# Patient Record
Sex: FEMALE | Race: OTHER | HISPANIC OR LATINO | URBAN - METROPOLITAN AREA
[De-identification: names, ages, dates, MRNs, and addresses within clinical notes are randomized per-mention and may not be internally consistent; named-entity substitution may affect disease eponyms.]

---

## 2019-09-20 ENCOUNTER — OUTPATIENT (OUTPATIENT)
Dept: OUTPATIENT SERVICES | Facility: HOSPITAL | Age: 79
LOS: 1 days | Discharge: HOME | End: 2019-09-20

## 2019-09-20 ENCOUNTER — APPOINTMENT (OUTPATIENT)
Dept: ULTRASOUND IMAGING | Facility: HOSPITAL | Age: 79
End: 2019-09-20
Payer: MEDICARE

## 2019-09-20 DIAGNOSIS — N20.0 CALCULUS OF KIDNEY: ICD-10-CM

## 2019-09-20 PROCEDURE — 76775 US EXAM ABDO BACK WALL LIM: CPT | Mod: 26

## 2019-09-30 ENCOUNTER — EMERGENCY (EMERGENCY)
Facility: HOSPITAL | Age: 79
LOS: 0 days | Discharge: HOME | End: 2019-09-30
Attending: EMERGENCY MEDICINE
Payer: MEDICARE

## 2019-09-30 VITALS
SYSTOLIC BLOOD PRESSURE: 146 MMHG | HEART RATE: 74 BPM | RESPIRATION RATE: 18 BRPM | TEMPERATURE: 98 F | DIASTOLIC BLOOD PRESSURE: 57 MMHG | OXYGEN SATURATION: 95 %

## 2019-09-30 VITALS
RESPIRATION RATE: 18 BRPM | HEART RATE: 68 BPM | SYSTOLIC BLOOD PRESSURE: 145 MMHG | TEMPERATURE: 99 F | WEIGHT: 169.98 LBS | OXYGEN SATURATION: 95 % | DIASTOLIC BLOOD PRESSURE: 57 MMHG

## 2019-09-30 DIAGNOSIS — Z87.891 PERSONAL HISTORY OF NICOTINE DEPENDENCE: ICD-10-CM

## 2019-09-30 DIAGNOSIS — R60.0 LOCALIZED EDEMA: ICD-10-CM

## 2019-09-30 DIAGNOSIS — Z87.442 PERSONAL HISTORY OF URINARY CALCULI: ICD-10-CM

## 2019-09-30 DIAGNOSIS — R06.00 DYSPNEA, UNSPECIFIED: ICD-10-CM

## 2019-09-30 DIAGNOSIS — R06.02 SHORTNESS OF BREATH: ICD-10-CM

## 2019-09-30 DIAGNOSIS — Z87.440 PERSONAL HISTORY OF URINARY (TRACT) INFECTIONS: ICD-10-CM

## 2019-09-30 DIAGNOSIS — I10 ESSENTIAL (PRIMARY) HYPERTENSION: ICD-10-CM

## 2019-09-30 LAB
ALBUMIN SERPL ELPH-MCNC: 3.1 G/DL — LOW (ref 3.3–5)
ALP SERPL-CCNC: 122 U/L — HIGH (ref 40–120)
ALT FLD-CCNC: 19 U/L — SIGNIFICANT CHANGE UP (ref 12–78)
ANION GAP SERPL CALC-SCNC: 6 MMOL/L — SIGNIFICANT CHANGE UP (ref 5–17)
APPEARANCE UR: CLEAR — SIGNIFICANT CHANGE UP
AST SERPL-CCNC: 26 U/L — SIGNIFICANT CHANGE UP (ref 15–37)
BACTERIA # UR AUTO: ABNORMAL
BASOPHILS # BLD AUTO: 0.02 K/UL — SIGNIFICANT CHANGE UP (ref 0–0.2)
BASOPHILS NFR BLD AUTO: 0.2 % — SIGNIFICANT CHANGE UP (ref 0–2)
BILIRUB SERPL-MCNC: 0.3 MG/DL — SIGNIFICANT CHANGE UP (ref 0.2–1.2)
BILIRUB UR-MCNC: NEGATIVE — SIGNIFICANT CHANGE UP
BUN SERPL-MCNC: 21 MG/DL — SIGNIFICANT CHANGE UP (ref 7–23)
CALCIUM SERPL-MCNC: 10.2 MG/DL — HIGH (ref 8.5–10.1)
CHLORIDE SERPL-SCNC: 104 MMOL/L — SIGNIFICANT CHANGE UP (ref 96–108)
CO2 SERPL-SCNC: 26 MMOL/L — SIGNIFICANT CHANGE UP (ref 22–31)
COLOR SPEC: YELLOW — SIGNIFICANT CHANGE UP
CREAT SERPL-MCNC: 1.02 MG/DL — SIGNIFICANT CHANGE UP (ref 0.5–1.3)
DIFF PNL FLD: NEGATIVE — SIGNIFICANT CHANGE UP
EOSINOPHIL # BLD AUTO: 0.18 K/UL — SIGNIFICANT CHANGE UP (ref 0–0.5)
EOSINOPHIL NFR BLD AUTO: 2.1 % — SIGNIFICANT CHANGE UP (ref 0–6)
EPI CELLS # UR: SIGNIFICANT CHANGE UP
GLUCOSE SERPL-MCNC: 97 MG/DL — SIGNIFICANT CHANGE UP (ref 70–99)
GLUCOSE UR QL: NEGATIVE MG/DL — SIGNIFICANT CHANGE UP
HCT VFR BLD CALC: 38 % — SIGNIFICANT CHANGE UP (ref 34.5–45)
HGB BLD-MCNC: 11.9 G/DL — SIGNIFICANT CHANGE UP (ref 11.5–15.5)
IMM GRANULOCYTES NFR BLD AUTO: 0.3 % — SIGNIFICANT CHANGE UP (ref 0–1.5)
KETONES UR-MCNC: NEGATIVE — SIGNIFICANT CHANGE UP
LEUKOCYTE ESTERASE UR-ACNC: ABNORMAL
LYMPHOCYTES # BLD AUTO: 2.12 K/UL — SIGNIFICANT CHANGE UP (ref 1–3.3)
LYMPHOCYTES # BLD AUTO: 24.7 % — SIGNIFICANT CHANGE UP (ref 13–44)
MCHC RBC-ENTMCNC: 26.7 PG — LOW (ref 27–34)
MCHC RBC-ENTMCNC: 31.3 GM/DL — LOW (ref 32–36)
MCV RBC AUTO: 85.2 FL — SIGNIFICANT CHANGE UP (ref 80–100)
MONOCYTES # BLD AUTO: 0.73 K/UL — SIGNIFICANT CHANGE UP (ref 0–0.9)
MONOCYTES NFR BLD AUTO: 8.5 % — SIGNIFICANT CHANGE UP (ref 2–14)
NEUTROPHILS # BLD AUTO: 5.5 K/UL — SIGNIFICANT CHANGE UP (ref 1.8–7.4)
NEUTROPHILS NFR BLD AUTO: 64.2 % — SIGNIFICANT CHANGE UP (ref 43–77)
NITRITE UR-MCNC: NEGATIVE — SIGNIFICANT CHANGE UP
NRBC # BLD: 0 /100 WBCS — SIGNIFICANT CHANGE UP (ref 0–0)
PH UR: 6 — SIGNIFICANT CHANGE UP (ref 5–8)
PLATELET # BLD AUTO: 288 K/UL — SIGNIFICANT CHANGE UP (ref 150–400)
POTASSIUM SERPL-MCNC: 4.9 MMOL/L — SIGNIFICANT CHANGE UP (ref 3.5–5.3)
POTASSIUM SERPL-SCNC: 4.9 MMOL/L — SIGNIFICANT CHANGE UP (ref 3.5–5.3)
PROT SERPL-MCNC: 7.7 GM/DL — SIGNIFICANT CHANGE UP (ref 6–8.3)
PROT UR-MCNC: NEGATIVE MG/DL — SIGNIFICANT CHANGE UP
RBC # BLD: 4.46 M/UL — SIGNIFICANT CHANGE UP (ref 3.8–5.2)
RBC # FLD: 14.1 % — SIGNIFICANT CHANGE UP (ref 10.3–14.5)
SODIUM SERPL-SCNC: 136 MMOL/L — SIGNIFICANT CHANGE UP (ref 135–145)
SP GR SPEC: 1.01 — SIGNIFICANT CHANGE UP (ref 1.01–1.02)
TROPONIN I SERPL-MCNC: <.015 NG/ML — SIGNIFICANT CHANGE UP (ref 0.01–0.04)
UROBILINOGEN FLD QL: NEGATIVE MG/DL — SIGNIFICANT CHANGE UP
WBC # BLD: 8.58 K/UL — SIGNIFICANT CHANGE UP (ref 3.8–10.5)
WBC # FLD AUTO: 8.58 K/UL — SIGNIFICANT CHANGE UP (ref 3.8–10.5)
WBC UR QL: SIGNIFICANT CHANGE UP

## 2019-09-30 PROCEDURE — 71275 CT ANGIOGRAPHY CHEST: CPT | Mod: 26

## 2019-09-30 PROCEDURE — 71045 X-RAY EXAM CHEST 1 VIEW: CPT | Mod: 26

## 2019-09-30 PROCEDURE — 99284 EMERGENCY DEPT VISIT MOD MDM: CPT

## 2019-09-30 PROCEDURE — 93970 EXTREMITY STUDY: CPT | Mod: 26

## 2019-09-30 NOTE — ED ADULT NURSE NOTE - OBJECTIVE STATEMENT
79 female with a hx of htn complains of shortness of breath and bilateral 79 female with a hx of htn complains of shortness of breath and bilateral leg swelling from friday.

## 2019-09-30 NOTE — ED PROVIDER NOTE - PATIENT PORTAL LINK FT
You can access the FollowMyHealth Patient Portal offered by [facility] by registering at the following website: [email or web address]. By joining [employer]’s FollowMyHealth portal, you will also be able to view your health information using other applications (apps) compatible with our system.

## 2019-09-30 NOTE — ED PROVIDER NOTE - CLINICAL SUMMARY MEDICAL DECISION MAKING FREE TEXT BOX
labs, CT and US WNL; patient declined additional work up or cardiac obs and preferred to follow up with PMD; patient and family aware of signs/symptoms to return to ED

## 2019-09-30 NOTE — ED ADULT NURSE NOTE - NSIMPLEMENTINTERV_GEN_ALL_ED
Implemented All Universal Safety Interventions:  [place] to call system. Call bell, personal items and telephone within reach. Instruct patient to call for assistance. Room bathroom lighting operational. Non-slip footwear when patient is off stretcher. Physically safe environment: no spills, clutter or unnecessary equipment. Stretcher in lowest position, wheels locked, appropriate side rails in place.

## 2019-10-01 ENCOUNTER — OUTPATIENT (OUTPATIENT)
Dept: OUTPATIENT SERVICES | Facility: HOSPITAL | Age: 79
LOS: 1 days | End: 2019-10-01
Payer: MEDICARE

## 2019-10-01 LAB
CULTURE RESULTS: SIGNIFICANT CHANGE UP
SPECIMEN SOURCE: SIGNIFICANT CHANGE UP

## 2019-10-01 PROCEDURE — G9001: CPT

## 2019-10-10 DIAGNOSIS — Z71.89 OTHER SPECIFIED COUNSELING: ICD-10-CM

## 2019-10-10 PROBLEM — R73.03 PREDIABETES: Chronic | Status: ACTIVE | Noted: 2019-09-30

## 2021-07-17 ENCOUNTER — TRANSCRIPTION ENCOUNTER (OUTPATIENT)
Age: 81
End: 2021-07-17

## 2022-07-06 ENCOUNTER — APPOINTMENT (OUTPATIENT)
Dept: ORTHOPEDIC SURGERY | Facility: CLINIC | Age: 82
End: 2022-07-06

## 2022-07-06 PROBLEM — Z00.00 ENCOUNTER FOR PREVENTIVE HEALTH EXAMINATION: Status: ACTIVE | Noted: 2022-07-06

## 2022-07-14 ENCOUNTER — APPOINTMENT (OUTPATIENT)
Dept: ORTHOPEDIC SURGERY | Facility: CLINIC | Age: 82
End: 2022-07-14

## 2022-07-14 VITALS — BODY MASS INDEX: 34.55 KG/M2 | WEIGHT: 176 LBS | HEIGHT: 60 IN

## 2022-07-14 DIAGNOSIS — I10 ESSENTIAL (PRIMARY) HYPERTENSION: ICD-10-CM

## 2022-07-14 DIAGNOSIS — E78.00 PURE HYPERCHOLESTEROLEMIA, UNSPECIFIED: ICD-10-CM

## 2022-07-14 DIAGNOSIS — M17.0 BILATERAL PRIMARY OSTEOARTHRITIS OF KNEE: ICD-10-CM

## 2022-07-14 PROCEDURE — 99214 OFFICE O/P EST MOD 30 MIN: CPT | Mod: 25

## 2022-07-14 PROCEDURE — 73562 X-RAY EXAM OF KNEE 3: CPT | Mod: 50

## 2022-07-14 PROCEDURE — 20610 DRAIN/INJ JOINT/BURSA W/O US: CPT | Mod: 50

## 2022-09-08 ENCOUNTER — APPOINTMENT (OUTPATIENT)
Dept: MRI IMAGING | Facility: IMAGING CENTER | Age: 82
End: 2022-09-08

## 2023-06-04 ENCOUNTER — INPATIENT (INPATIENT)
Facility: HOSPITAL | Age: 83
LOS: 4 days | Discharge: HOME HEALTH | End: 2023-06-09
Attending: INTERNAL MEDICINE | Admitting: INTERNAL MEDICINE
Payer: MEDICARE

## 2023-06-04 VITALS
OXYGEN SATURATION: 96 % | HEART RATE: 129 BPM | WEIGHT: 179.9 LBS | DIASTOLIC BLOOD PRESSURE: 54 MMHG | RESPIRATION RATE: 18 BRPM | SYSTOLIC BLOOD PRESSURE: 140 MMHG | TEMPERATURE: 102 F

## 2023-06-04 DIAGNOSIS — I10 ESSENTIAL (PRIMARY) HYPERTENSION: ICD-10-CM

## 2023-06-04 DIAGNOSIS — Z95.5 PRESENCE OF CORONARY ANGIOPLASTY IMPLANT AND GRAFT: ICD-10-CM

## 2023-06-04 DIAGNOSIS — A41.9 SEPSIS, UNSPECIFIED ORGANISM: ICD-10-CM

## 2023-06-04 DIAGNOSIS — R73.03 PREDIABETES: ICD-10-CM

## 2023-06-04 DIAGNOSIS — N20.0 CALCULUS OF KIDNEY: ICD-10-CM

## 2023-06-04 DIAGNOSIS — J84.10 PULMONARY FIBROSIS, UNSPECIFIED: ICD-10-CM

## 2023-06-04 LAB
ALBUMIN SERPL ELPH-MCNC: 3 G/DL — LOW (ref 3.3–5)
ALP SERPL-CCNC: 123 U/L — HIGH (ref 40–120)
ALT FLD-CCNC: 27 U/L — SIGNIFICANT CHANGE UP (ref 12–78)
ANION GAP SERPL CALC-SCNC: 5 MMOL/L — SIGNIFICANT CHANGE UP (ref 5–17)
APPEARANCE UR: ABNORMAL
APTT BLD: 27.3 SEC — LOW (ref 27.5–35.5)
AST SERPL-CCNC: 19 U/L — SIGNIFICANT CHANGE UP (ref 15–37)
BACTERIA # UR AUTO: ABNORMAL
BASOPHILS # BLD AUTO: 0.02 K/UL — SIGNIFICANT CHANGE UP (ref 0–0.2)
BASOPHILS NFR BLD AUTO: 0.2 % — SIGNIFICANT CHANGE UP (ref 0–2)
BILIRUB SERPL-MCNC: 1 MG/DL — SIGNIFICANT CHANGE UP (ref 0.2–1.2)
BILIRUB UR-MCNC: NEGATIVE — SIGNIFICANT CHANGE UP
BUN SERPL-MCNC: 19 MG/DL — SIGNIFICANT CHANGE UP (ref 7–23)
CALCIUM SERPL-MCNC: 11.1 MG/DL — HIGH (ref 8.5–10.1)
CHLORIDE SERPL-SCNC: 102 MMOL/L — SIGNIFICANT CHANGE UP (ref 96–108)
CO2 SERPL-SCNC: 28 MMOL/L — SIGNIFICANT CHANGE UP (ref 22–31)
COLOR SPEC: YELLOW — SIGNIFICANT CHANGE UP
CREAT SERPL-MCNC: 1.13 MG/DL — SIGNIFICANT CHANGE UP (ref 0.5–1.3)
DIFF PNL FLD: ABNORMAL
EGFR: 48 ML/MIN/1.73M2 — LOW
EOSINOPHIL # BLD AUTO: 0.04 K/UL — SIGNIFICANT CHANGE UP (ref 0–0.5)
EOSINOPHIL NFR BLD AUTO: 0.3 % — SIGNIFICANT CHANGE UP (ref 0–6)
EPI CELLS # UR: SIGNIFICANT CHANGE UP
GLUCOSE SERPL-MCNC: 151 MG/DL — HIGH (ref 70–99)
GLUCOSE UR QL: NEGATIVE MG/DL — SIGNIFICANT CHANGE UP
HCT VFR BLD CALC: 42.8 % — SIGNIFICANT CHANGE UP (ref 34.5–45)
HGB BLD-MCNC: 13.2 G/DL — SIGNIFICANT CHANGE UP (ref 11.5–15.5)
IMM GRANULOCYTES NFR BLD AUTO: 0.4 % — SIGNIFICANT CHANGE UP (ref 0–0.9)
INR BLD: 1.11 RATIO — SIGNIFICANT CHANGE UP (ref 0.88–1.16)
KETONES UR-MCNC: NEGATIVE — SIGNIFICANT CHANGE UP
LACTATE SERPL-SCNC: 1.4 MMOL/L — SIGNIFICANT CHANGE UP (ref 0.7–2)
LEUKOCYTE ESTERASE UR-ACNC: ABNORMAL
LYMPHOCYTES # BLD AUTO: 0.72 K/UL — LOW (ref 1–3.3)
LYMPHOCYTES # BLD AUTO: 5.7 % — LOW (ref 13–44)
MCHC RBC-ENTMCNC: 27.3 PG — SIGNIFICANT CHANGE UP (ref 27–34)
MCHC RBC-ENTMCNC: 30.8 G/DL — LOW (ref 32–36)
MCV RBC AUTO: 88.4 FL — SIGNIFICANT CHANGE UP (ref 80–100)
MONOCYTES # BLD AUTO: 0.98 K/UL — HIGH (ref 0–0.9)
MONOCYTES NFR BLD AUTO: 7.8 % — SIGNIFICANT CHANGE UP (ref 2–14)
NEUTROPHILS # BLD AUTO: 10.81 K/UL — HIGH (ref 1.8–7.4)
NEUTROPHILS NFR BLD AUTO: 85.6 % — HIGH (ref 43–77)
NITRITE UR-MCNC: POSITIVE
NRBC # BLD: 0 /100 WBCS — SIGNIFICANT CHANGE UP (ref 0–0)
PH UR: 7 — SIGNIFICANT CHANGE UP (ref 5–8)
PLATELET # BLD AUTO: 234 K/UL — SIGNIFICANT CHANGE UP (ref 150–400)
POTASSIUM SERPL-MCNC: 4.4 MMOL/L — SIGNIFICANT CHANGE UP (ref 3.5–5.3)
POTASSIUM SERPL-SCNC: 4.4 MMOL/L — SIGNIFICANT CHANGE UP (ref 3.5–5.3)
PROT SERPL-MCNC: 7.5 GM/DL — SIGNIFICANT CHANGE UP (ref 6–8.3)
PROT UR-MCNC: 15 MG/DL
PROTHROM AB SERPL-ACNC: 13.2 SEC — SIGNIFICANT CHANGE UP (ref 10.5–13.4)
RAPID RVP RESULT: SIGNIFICANT CHANGE UP
RBC # BLD: 4.84 M/UL — SIGNIFICANT CHANGE UP (ref 3.8–5.2)
RBC # FLD: 14.9 % — HIGH (ref 10.3–14.5)
RBC CASTS # UR COMP ASSIST: ABNORMAL /HPF (ref 0–4)
SARS-COV-2 RNA SPEC QL NAA+PROBE: SIGNIFICANT CHANGE UP
SODIUM SERPL-SCNC: 135 MMOL/L — SIGNIFICANT CHANGE UP (ref 135–145)
SP GR SPEC: 1.01 — SIGNIFICANT CHANGE UP (ref 1.01–1.02)
URATE SERPL-MCNC: 4.2 MG/DL — SIGNIFICANT CHANGE UP (ref 2.5–7)
UROBILINOGEN FLD QL: NEGATIVE MG/DL — SIGNIFICANT CHANGE UP
WBC # BLD: 12.62 K/UL — HIGH (ref 3.8–10.5)
WBC # FLD AUTO: 12.62 K/UL — HIGH (ref 3.8–10.5)
WBC UR QL: ABNORMAL

## 2023-06-04 PROCEDURE — 99285 EMERGENCY DEPT VISIT HI MDM: CPT

## 2023-06-04 PROCEDURE — 71045 X-RAY EXAM CHEST 1 VIEW: CPT | Mod: 26

## 2023-06-04 PROCEDURE — 93010 ELECTROCARDIOGRAM REPORT: CPT

## 2023-06-04 RX ORDER — CEFTRIAXONE 500 MG/1
1000 INJECTION, POWDER, FOR SOLUTION INTRAMUSCULAR; INTRAVENOUS EVERY 24 HOURS
Refills: 0 | Status: DISCONTINUED | OUTPATIENT
Start: 2023-06-05 | End: 2023-06-09

## 2023-06-04 RX ORDER — ALLOPURINOL 300 MG
100 TABLET ORAL DAILY
Refills: 0 | Status: DISCONTINUED | OUTPATIENT
Start: 2023-06-04 | End: 2023-06-09

## 2023-06-04 RX ORDER — ASPIRIN/CALCIUM CARB/MAGNESIUM 324 MG
81 TABLET ORAL DAILY
Refills: 0 | Status: DISCONTINUED | OUTPATIENT
Start: 2023-06-04 | End: 2023-06-09

## 2023-06-04 RX ORDER — DEXTROSE 50 % IN WATER 50 %
25 SYRINGE (ML) INTRAVENOUS ONCE
Refills: 0 | Status: DISCONTINUED | OUTPATIENT
Start: 2023-06-04 | End: 2023-06-09

## 2023-06-04 RX ORDER — SODIUM CHLORIDE 9 MG/ML
1000 INJECTION, SOLUTION INTRAVENOUS
Refills: 0 | Status: DISCONTINUED | OUTPATIENT
Start: 2023-06-04 | End: 2023-06-09

## 2023-06-04 RX ORDER — ATORVASTATIN CALCIUM 80 MG/1
20 TABLET, FILM COATED ORAL AT BEDTIME
Refills: 0 | Status: DISCONTINUED | OUTPATIENT
Start: 2023-06-04 | End: 2023-06-09

## 2023-06-04 RX ORDER — ACETAMINOPHEN 500 MG
1000 TABLET ORAL ONCE
Refills: 0 | Status: COMPLETED | OUTPATIENT
Start: 2023-06-04 | End: 2023-06-04

## 2023-06-04 RX ORDER — GLUCAGON INJECTION, SOLUTION 0.5 MG/.1ML
1 INJECTION, SOLUTION SUBCUTANEOUS ONCE
Refills: 0 | Status: DISCONTINUED | OUTPATIENT
Start: 2023-06-04 | End: 2023-06-09

## 2023-06-04 RX ORDER — METOPROLOL TARTRATE 50 MG
25 TABLET ORAL
Refills: 0 | Status: DISCONTINUED | OUTPATIENT
Start: 2023-06-04 | End: 2023-06-09

## 2023-06-04 RX ORDER — DEXTROSE 50 % IN WATER 50 %
12.5 SYRINGE (ML) INTRAVENOUS ONCE
Refills: 0 | Status: DISCONTINUED | OUTPATIENT
Start: 2023-06-04 | End: 2023-06-09

## 2023-06-04 RX ORDER — INSULIN LISPRO 100/ML
VIAL (ML) SUBCUTANEOUS
Refills: 0 | Status: DISCONTINUED | OUTPATIENT
Start: 2023-06-04 | End: 2023-06-09

## 2023-06-04 RX ORDER — HEPARIN SODIUM 5000 [USP'U]/ML
5000 INJECTION INTRAVENOUS; SUBCUTANEOUS EVERY 12 HOURS
Refills: 0 | Status: DISCONTINUED | OUTPATIENT
Start: 2023-06-04 | End: 2023-06-09

## 2023-06-04 RX ORDER — CEFTRIAXONE 500 MG/1
1000 INJECTION, POWDER, FOR SOLUTION INTRAMUSCULAR; INTRAVENOUS ONCE
Refills: 0 | Status: COMPLETED | OUTPATIENT
Start: 2023-06-04 | End: 2023-06-04

## 2023-06-04 RX ORDER — SODIUM CHLORIDE 9 MG/ML
2500 INJECTION INTRAMUSCULAR; INTRAVENOUS; SUBCUTANEOUS ONCE
Refills: 0 | Status: COMPLETED | OUTPATIENT
Start: 2023-06-04 | End: 2023-06-04

## 2023-06-04 RX ORDER — DEXTROSE 50 % IN WATER 50 %
15 SYRINGE (ML) INTRAVENOUS ONCE
Refills: 0 | Status: DISCONTINUED | OUTPATIENT
Start: 2023-06-04 | End: 2023-06-09

## 2023-06-04 RX ORDER — ACETAMINOPHEN 500 MG
650 TABLET ORAL EVERY 6 HOURS
Refills: 0 | Status: DISCONTINUED | OUTPATIENT
Start: 2023-06-04 | End: 2023-06-09

## 2023-06-04 RX ADMIN — ATORVASTATIN CALCIUM 20 MILLIGRAM(S): 80 TABLET, FILM COATED ORAL at 21:39

## 2023-06-04 RX ADMIN — Medication 1000 MILLIGRAM(S): at 17:11

## 2023-06-04 RX ADMIN — SODIUM CHLORIDE 2500 MILLILITER(S): 9 INJECTION INTRAMUSCULAR; INTRAVENOUS; SUBCUTANEOUS at 15:27

## 2023-06-04 RX ADMIN — CEFTRIAXONE 1000 MILLIGRAM(S): 500 INJECTION, POWDER, FOR SOLUTION INTRAMUSCULAR; INTRAVENOUS at 17:11

## 2023-06-04 RX ADMIN — Medication 400 MILLIGRAM(S): at 15:27

## 2023-06-04 RX ADMIN — CEFTRIAXONE 100 MILLIGRAM(S): 500 INJECTION, POWDER, FOR SOLUTION INTRAMUSCULAR; INTRAVENOUS at 15:27

## 2023-06-04 NOTE — ED PROVIDER NOTE - ATTENDING APP SHARED VISIT CONTRIBUTION OF CARE
83F pmhx pulm fibrosis, CAD sp stent, recent steroids, who presents with confusion this morning at approx 12pm, last known normal approx 10AM, with generalized weakness, difficulty walking, and found to be febrile here. Pt was feeling unwell yday evening into today. Noted occasional cough. No vomiting, chest pain, or sob. Pt accompanied by adult children who are assisting with Bur[place]ese translation . No reported head injury/ trauma     Initially called to triage to eval for possible CVA - NIHSS 0- stroke code not called as no focal deficits and symptoms explained by fever, rule out sepsis   On exam pt aox3, nad   Head: NCAT  ENT: Airway patent, moist mucous membranes, nasal passageways clear   Cardiac: Normal rate, normal rhythm, no murmurs   Respiratory: scant basal insp crackles   Gastrointestinal:  Abdomen soft, nontender, nondistended, no rebound   MSK: No gross abnormalities, FROM of all four extremities, no edema  HEME: Extremities warm, pulses intact and symmetrical in all four extremities  Skin: No rashes, no lesions  Neuro: No gross neurologic deficits, CN II-XII intact, no facial asymmetry, no dysarthria, no dysmetria, strength equal in all four extremities, no gait abnormality, EOMI, b/l eyes PERRLA, no nystagmus    plan - rule out sepsis, bcx, ucx, ekg, eval for electrolyte derangements , IVF, empiric ceftriaxone, rule out UTI

## 2023-06-04 NOTE — H&P ADULT - NSHPLABSRESULTS_GEN_ALL_CORE
LABS:                        13.2   12.62 )-----------( 234      ( 2023 14:47 )             42.8     -04    135  |  102  |  19  ----------------------------<  151<H>  4.4   |  28  |  1.13    Ca    11.1<H>      2023 14:47    TPro  7.5  /  Alb  3.0<L>  /  TBili  1.0  /  DBili  x   /  AST  19  /  ALT  27  /  AlkPhos  123<H>  06-04    PT/INR - ( 2023 14:47 )   PT: 13.2 sec;   INR: 1.11 ratio         PTT - ( 2023 14:47 )  PTT:27.3 sec  Urinalysis Basic - ( 2023 14:47 )    Color: Yellow / Appearance: Slightly Turbid / S.010 / pH: x  Gluc: x / Ketone: Negative  / Bili: Negative / Urobili: Negative mg/dL   Blood: x / Protein: 15 mg/dL / Nitrite: Positive   Leuk Esterase: Moderate / RBC: 3-5 /HPF / WBC 11-25   Sq Epi: x / Non Sq Epi: x / Bacteria: TNTC

## 2023-06-04 NOTE — H&P ADULT - ASSESSMENT
IMPROVE VTE Individual Risk Assessment    RISK                                                                Points    [  ] Previous VTE                                                  3    [  ] Thrombophilia                                               2    [  ] Lower limb paralysis                                      2        (unable to hold up >15 seconds)      [  ] Current Cancer                                              2         (within 6 months)    [ x ] Immobilization > 24 hrs                                1    [  ] ICU/CCU stay > 24 hours                              1    [  x] Age > 60                                                      1    IMPROVE VTE Score _____2____    IMPROVE Score 0-1: Low Risk, No VTE prophylaxis required for most patients, encourage ambulation.   IMPROVE Score 2-3: At risk, pharmacologic VTE prophylaxis is indicated for most patients (in the absence of a contraindication)  IMPROVE Score > or = 4: High Risk, pharmacologic VTE prophylaxis is indicated for most patients (in the absence of a contraindication)

## 2023-06-04 NOTE — PATIENT PROFILE ADULT - FALL HARM RISK - HARM RISK INTERVENTIONS

## 2023-06-04 NOTE — ED ADULT NURSE NOTE - OBJECTIVE STATEMENT
pt axox4 presents to the ED c.o weakness. Per daughter, pt states she's been having fever, cough, weakness and painful urination. hx HTN, HLD, pulmonary fibrosis, CAD with2 stents, on baby aspirin. Pt had brief episode of confusion today where she did not know where she was. Denies n/v/d, dizziness, headache, cp, sob. pt tachy and febrile in triage.

## 2023-06-04 NOTE — H&P ADULT - HISTORY OF PRESENT ILLNESS
83-year-old female past medical history of hypertension, hyperlipidemia, pulmonary fibrosis, CAD status post 2 stents, on baby aspirin only presents emergency room for weakness.  Per daughter, patient has not been feeling good since yesterday.  Reports fever, dry cough and painful urination.  States today around 12:00 she felt her mom was confused, did not know who she was of brought her in for evaluation.  Denies runny nose, stuffy nose, nasal congestion, sore throat, chest pain, shortness of breath, difficulty breathing, abdominal pain, nausea, vomiting, diarrhea.  Denies fever or pain medications prior to arrival.  Of note, code stroke evaluation called in triage given brief episode of confusion and reported right leg weakness.  Patient meets code sepsis criteria, code stroke was not called at this time setting of stroke scale was 0.

## 2023-06-04 NOTE — H&P ADULT - NSICDXPASTMEDICALHX_GEN_ALL_CORE_FT
PAST MEDICAL HISTORY:  H/O: Osteoarthritis     HTN (Hypertension)     Prediabetes     Pulmonary fibrosis     Recurrent UTI     Renal Calculi

## 2023-06-04 NOTE — ED ADULT NURSE NOTE - CHIEF COMPLAINT QUOTE
patient c/o of weakness started 3 days ago , with fever , c/o of frequent urination denied any pain, as per daughter [place] patient  was confuse 2 hours ago with R leg weakness

## 2023-06-04 NOTE — PATIENT PROFILE ADULT - FUNCTIONAL ASSESSMENT - BASIC MOBILITY 6.
4-calculated by average/Not able to assess (calculate score using [facility] averaging method)

## 2023-06-04 NOTE — ED ADULT TRIAGE NOTE - CHIEF COMPLAINT QUOTE
patient c/o of weakness started 3 days ago , with fever , c/o of frequent urination denied any pain patient c/o of weakness started 3 days ago , with fever , c/o of frequent urination denied any pain, as per daughter [place] patient  was confuse 2 hours ago with R leg weakness

## 2023-06-04 NOTE — H&P ADULT - NSHPPHYSICALEXAM_GEN_ALL_CORE
PHYSICAL EXAM:    GENERAL: NAD, well-groomed, well-developed  HEAD:  Atraumatic, Normocephalic  EYES: EOMI,  conjunctiva and sclera clear    NECK: Supple, No JVD, Normal thyroid  NERVOUS SYSTEM:  Alert & Oriented X 2 no  focal deficit  CHEST/LUNG: Clear  bilaterally; No rales, rhonchi, wheezing, or rubs  HEART: Regular rate and rhythm; No murmurs, rubs, or gallops  ABDOMEN: Soft, Nontender, Nondistended; no  masses Bowel sounds present  EXTREMITIES:  + Peripheral Pulses, No clubbing, cyanosis, or edema  LYMPH: No lymphadenopathy noted   RECTAL: deferred  SKIN: No rashes or lesions

## 2023-06-05 LAB
A1C WITH ESTIMATED AVERAGE GLUCOSE RESULT: 6 % — HIGH (ref 4–5.6)
CHOLEST SERPL-MCNC: 118 MG/DL — SIGNIFICANT CHANGE UP
ESTIMATED AVERAGE GLUCOSE: 126 MG/DL — HIGH (ref 68–114)
HDLC SERPL-MCNC: 51 MG/DL — SIGNIFICANT CHANGE UP
LIPID PNL WITH DIRECT LDL SERPL: 51 MG/DL — SIGNIFICANT CHANGE UP
NON HDL CHOLESTEROL: 67 MG/DL — SIGNIFICANT CHANGE UP
TRIGL SERPL-MCNC: 81 MG/DL — SIGNIFICANT CHANGE UP

## 2023-06-05 PROCEDURE — 76775 US EXAM ABDO BACK WALL LIM: CPT | Mod: 26

## 2023-06-05 RX ORDER — LACTOBACILLUS ACIDOPHILUS 100MM CELL
1 CAPSULE ORAL EVERY 12 HOURS
Refills: 0 | Status: DISCONTINUED | OUTPATIENT
Start: 2023-06-05 | End: 2023-06-09

## 2023-06-05 RX ADMIN — HEPARIN SODIUM 5000 UNIT(S): 5000 INJECTION INTRAVENOUS; SUBCUTANEOUS at 05:43

## 2023-06-05 RX ADMIN — CEFTRIAXONE 100 MILLIGRAM(S): 500 INJECTION, POWDER, FOR SOLUTION INTRAMUSCULAR; INTRAVENOUS at 17:44

## 2023-06-05 RX ADMIN — Medication 100 MILLIGRAM(S): at 11:18

## 2023-06-05 RX ADMIN — Medication 25 MILLIGRAM(S): at 05:43

## 2023-06-05 RX ADMIN — ATORVASTATIN CALCIUM 20 MILLIGRAM(S): 80 TABLET, FILM COATED ORAL at 21:35

## 2023-06-05 RX ADMIN — Medication 25 MILLIGRAM(S): at 17:45

## 2023-06-05 RX ADMIN — Medication 5 MILLIGRAM(S): at 05:43

## 2023-06-05 RX ADMIN — HEPARIN SODIUM 5000 UNIT(S): 5000 INJECTION INTRAVENOUS; SUBCUTANEOUS at 17:44

## 2023-06-05 RX ADMIN — Medication 81 MILLIGRAM(S): at 11:18

## 2023-06-05 NOTE — CONSULT NOTE ADULT - SUBJECTIVE AND OBJECTIVE BOX
HPI:  84 y/o   female with hx of HTN,  CAD, s/p Coronary Stent placement, Pulmonary Fibrosis, Uric Acid Renal Calculi, Pre-Diabetic, admitted to [facility] via the ER on 23 with            Allergies :    No Known Allergies    Intolerances - none        Social History:  Tobacco:  Alcohol:  Recent Travel:  Immunizations:            MEDICATIONS  (STANDING):  allopurinol 100 milliGRAM(s) Oral daily  aspirin enteric coated 81 milliGRAM(s) Oral daily  atorvastatin 20 milliGRAM(s) Oral at bedtime  cefTRIAXone   IVPB 1000 milliGRAM(s) IV Intermittent every 24 hours  dextrose 5%. 1000 milliLiter(s) (50 mL/Hr) IV Continuous <Continuous>  dextrose 5%. 1000 milliLiter(s) (100 mL/Hr) IV Continuous <Continuous>  dextrose 50% Injectable 25 Gram(s) IV Push once  dextrose 50% Injectable 12.5 Gram(s) IV Push once  dextrose 50% Injectable 25 Gram(s) IV Push once  glucagon  Injectable 1 milliGRAM(s) IntraMuscular once  heparin   Injectable 5000 Unit(s) SubCutaneous every 12 hours  insulin lispro (ADMELOG) corrective regimen sliding scale   SubCutaneous three times a day before meals  metoprolol tartrate 25 milliGRAM(s) Oral two times a day  predniSONE   Tablet 5 milliGRAM(s) Oral daily    MEDICATIONS  (PRN):  acetaminophen     Tablet .. 650 milliGRAM(s) Oral every 6 hours PRN Temp greater or equal to 38C (100.4F)  dextrose Oral Gel 15 Gram(s) Oral once PRN Blood Glucose LESS THAN 70 milliGRAM(s)/deciliter      LABS:  CBC Full  -  ( 2023 14:47 )  WBC Count : 12.62 K/uL  RBC Count : 4.84 M/uL  Hemoglobin : 13.2 g/dL  Hematocrit : 42.8 %  Platelet Count - Automated : 234 K/uL  Mean Cell Volume : 88.4 fl  Mean Cell Hemoglobin : 27.3 pg  Mean Cell Hemoglobin Concentration : 30.8 g/dL  Auto Neutrophil # : 10.81 K/uL  Auto Lymphocyte # : 0.72 K/uL  Auto Monocyte # : 0.98 K/uL  Auto Eosinophil # : 0.04 K/uL  Auto Basophil # : 0.02 K/uL  Auto Neutrophil % : 85.6 %  Auto Lymphocyte % : 5.7 %  Auto Monocyte % : 7.8 %  Auto Eosinophil % : 0.3 %  Auto Basophil % : 0.2 %        135  |  102  |  19  ----------------------------<  151<H>  4.4   |  28  |  1.13    Ca    11.1<H>      2023 14:47    TPro  7.5  /  Alb  3.0<L>  /  TBili  1.0  /  DBili  x   /  AST  19  /  ALT  27  /  AlkPhos  123<H>      LIVER FUNCTIONS - ( 2023 14:47 )  Alb: 3.0 g/dL / Pro: 7.5 gm/dL / ALK PHOS: 123 U/L / ALT: 27 U/L / AST: 19 U/L / GGT: x           PT/INR - ( 2023 14:47 )   PT: 13.2 sec;   INR: 1.11 ratio     PTT - ( 2023 14:47 )  PTT:27.3 sec    Urinalysis Basic - ( 2023 14:47 )  Color: Yellow / Appearance: Slightly Turbid / S.010 / pH: x  Gluc: x / Ketone: Negative  / Bili: Negative / Urobili: Negative mg/dL   Blood: x / Protein: 15 mg/dL / Nitrite: Positive   Leuk Esterase: Moderate / RBC: 3-5 /HPF / WBC 11-25   Sq Epi: x / Non Sq Epi: x / [facility]: [facility]          MICROBIOLOGY:  Respiratory Viral Panel with COVID-19 by JOSE ANTONIO (23 @ 14:47)    Rapid RVP Result: [facility]   SARS-CoV-2: NotDete: This Respiratory Panel uses polymerase chain reaction (PCR) to detect for  adenovirus; coronavirus (HKU1, NL63, 229E, OC43); human metapneumovirus  (hMPV); human enterovirus/rhinovirus (Entero/RV); influenza A; influenza  A/H1; influenza A/H3; influenza A/H1-2009; influenza B; parainfluenza  viruses 1, 2, 3, 4; respiratory syncytial virus; Mycoplasma pneumoniae;  Chlamydophila pneumoniae; and SARS-CoV-2.        RADIOLOGY:  < from: US Renal (23 @ 13:58) >  ACC: [id number] EXAM:  US KIDNEY(S)   ORDERED BY: JIE AMIN     PROCEDURE DATE:  2023      INTERPRETATION:  CLINICAL INFORMATION: Urinary tract infection  COMPARISON: 2019  TECHNIQUE: Sonography of the kidneys and bladder.  FINDINGS:  Right kidney: 8.2 cm. Cortical thinning. No renal mass, hydronephrosis or   calculi.  Left kidney: 9.0 cm. No renal mass, hydronephrosis or calculi.  Urinary bladder: Within normal limits.  IMPRESSION:  No hydronephrosis.     < from: Xray Chest 1 View- PORTABLE-Urgent (23 @ 15:48) >  ACC: [id number] EXAM:  XR CHEST PORTABLE URGENT 1V   ORDERED BY: CHEIKH LENZ   PROCEDURE DATE:  2023    INTERPRETATION:  AP chest on 2023 at 3:08 PM. Patient has sepsis.  Heart magnified by technique.  There is slight blunting of left base laterally increased from 2019.  IMPRESSION: Slight blunting left base laterally at this time.         Vital Signs Last 24 Hrs  T(C): 36.7 (2023 10:56), Max: 37.8 (2023 16:53)  T(F): 98 (2023 10:56), Max: 100 (2023 16:53)  HR: 95 (2023 10:56) (60 - 99)  BP: 126/77 (2023 10:56) (119/48 - 168/68)  BP(mean): --  RR: 18 (2023 10:56) (15 - 18)  SpO2: 98% (2023 10:56) (94% - 98%)  Parameters below as of 2023 10:56  Patient On (Oxygen Delivery Method): room air  Supplemental O2:    PHYSICAL EXAM    General:  84 y/o   female   HEENT:   Neck:  Heart:  Lungs:  Abdomen:  Extremities:  Skin:          I&O's Summary :    2023 07:01  -  2023 07:00  --------------------------------------------------------  IN: 120 mL / OUT: 0 mL / NET: 120 mL        Impression:  84 y/o  female with hx of     Suggestions: HPI:  84 y/o female  born in [place] the [place]rs[place] with hx of HTN,  CAD, s/p Coronary Stent placement, Pulmonary Fibrosis, maintained on 5 mg of Prednisone/day, Uric Acid Renal Calculi, Pre-Diabetic, admitted to [facility] via the ER on 23 with c/o weakness and decreased po intake  x 2 days at home.  In the ER patient was found to be febrile to 101.7 and w/u revealed WBC's were elevated to 12.620            Allergies :    No Known Allergies    Intolerances - none        Social History:  Tobacco: negative  Alcohol: none  Recent Travel: none  Immunizations: Rec'd the COVID-19 Vaccine in                          Rec'd the Influenza Vaccine for 1824-5698 Season                         ? rec'd the Pneumococcal Vaccine  Lives with her daughter and her family  Born in [place] - in the  x 15yrs.  Ambulates without assistance at home  No Pets      MEDICATIONS  (STANDING):  allopurinol 100 milliGRAM(s) Oral daily  aspirin enteric coated 81 milliGRAM(s) Oral daily  atorvastatin 20 milliGRAM(s) Oral at bedtime  cefTRIAXone   IVPB 1000 milliGRAM(s) IV Intermittent every 24 hours  dextrose 5%. 1000 milliLiter(s) (50 mL/Hr) IV Continuous <Continuous>  dextrose 5%. 1000 milliLiter(s) (100 mL/Hr) IV Continuous <Continuous>  dextrose 50% Injectable 25 Gram(s) IV Push once  dextrose 50% Injectable 12.5 Gram(s) IV Push once  dextrose 50% Injectable 25 Gram(s) IV Push once  glucagon  Injectable 1 milliGRAM(s) IntraMuscular once  heparin   Injectable 5000 Unit(s) SubCutaneous every 12 hours  insulin lispro (ADMELOG) corrective regimen sliding scale   SubCutaneous three times a day before meals  metoprolol tartrate 25 milliGRAM(s) Oral two times a day  predniSONE   Tablet 5 milliGRAM(s) Oral daily    MEDICATIONS  (PRN):  acetaminophen     Tablet .. 650 milliGRAM(s) Oral every 6 hours PRN Temp greater or equal to 38C (100.4F)  dextrose Oral Gel 15 Gram(s) Oral once PRN Blood Glucose LESS THAN 70 milliGRAM(s)/deciliter      LABS:  CBC Full  -  ( 2023 14:47 )  WBC Count : 12.62 K/uL  RBC Count : 4.84 M/uL  Hemoglobin : 13.2 g/dL  Hematocrit : 42.8 %  Platelet Count - Automated : 234 K/uL  Mean Cell Volume : 88.4 fl  Mean Cell Hemoglobin : 27.3 pg  Mean Cell Hemoglobin Concentration : 30.8 g/dL  Auto Neutrophil # : 10.81 K/uL  Auto Lymphocyte # : 0.72 K/uL  Auto Monocyte # : 0.98 K/uL  Auto Eosinophil # : 0.04 K/uL  Auto Basophil # : 0.02 K/uL  Auto Neutrophil % : 85.6 %  Auto Lymphocyte % : 5.7 %  Auto Monocyte % : 7.8 %  Auto Eosinophil % : 0.3 %  Auto Basophil % : 0.2 %        135  |  102  |  19  ----------------------------<  151<H>  4.4   |  28  |  1.13    Ca    11.1<H>      2023 14:47    TPro  7.5  /  Alb  3.0<L>  /  TBili  1.0  /  DBili  x   /  AST  19  /  ALT  27  /  AlkPhos  123<H>      LIVER FUNCTIONS - ( 2023 14:47 )  Alb: 3.0 g/dL / Pro: 7.5 gm/dL / ALK PHOS: 123 U/L / ALT: 27 U/L / AST: 19 U/L / GGT: x           PT/INR - ( 2023 14:47 )   PT: 13.2 sec;   INR: 1.11 ratio     PTT - ( 2023 14:47 )  PTT:27.3 sec    Urinalysis Basic - ( 2023 14:47 )  Color: Yellow / Appearance: Slightly Turbid / S.010 / pH: x  Gluc: x / Ketone: Negative  / Bili: Negative / Urobili: Negative mg/dL   Blood: x / Protein: 15 mg/dL / Nitrite: Positive   Leuk Esterase: Moderate / RBC: 3-5 /HPF / WBC 11-25   Sq Epi: x / Non Sq Epi: x / Bacteria: TNTC    A1C with Estimated Average Glucose (23 @ 06:10)    A1C with Estimated Average Glucose Result: 6.0:   Estimated Average Glucose: 126:     Lactate, Blood (23 @ 14:47)    Lactate, Blood: 1.4 mmol/L      MICROBIOLOGY:  Respiratory Viral Panel with COVID-19 by JOSE ANTONIO (23 @ 14:47)    Rapid RVP Result: [facility]   SARS-CoV-2: NotDete: This Respiratory Panel uses polymerase chain reaction (PCR) to detect for  adenovirus; coronavirus (HKU1, NL63, 229E, OC43); human metapneumovirus  (hMPV); human enterovirus/rhinovirus (Entero/RV); influenza A; influenza  A/H1; influenza A/H3; influenza A/H1-2009; influenza B; parainfluenza  viruses 1, 2, 3, 4; respiratory syncytial virus; Mycoplasma pneumoniae;  Chlamydophila pneumoniae; and SARS-CoV-2.        RADIOLOGY:  < from: US Renal (23 @ 13:58) >  ACC: [id number] EXAM:  US KIDNEY(S)   ORDERED BY: JIE AMIN   PROCEDURE DATE:  2023    INTERPRETATION:  CLINICAL INFORMATION: Urinary tract infection  COMPARISON: 2019  TECHNIQUE: Sonography of the kidneys and bladder.  FINDINGS:  Right kidney: 8.2 cm. Cortical thinning. No renal mass, hydronephrosis or   calculi.  Left kidney: 9.0 cm. No renal mass, hydronephrosis or calculi.  Urinary bladder: Within normal limits.  IMPRESSION:  No hydronephrosis.     < from: Xray Chest 1 View- PORTABLE-Urgent (23 @ 15:48) >  ACC: [id number] EXAM:  XR CHEST PORTABLE URGENT 1V   ORDERED BY: CHEIKH LENZ   PROCEDURE DATE:  2023    INTERPRETATION:  AP chest on 2023 at 3:08 PM. Patient has sepsis.  Heart magnified by technique.  There is slight blunting of left base laterally increased from 2019.  IMPRESSION: Slight blunting left base laterally at this time.         Vital Signs Last 24 Hrs  T(C): 36.7 (2023 10:56), Max: 37.8 (2023 16:53)  T(F): 98 (2023 10:56), Max: 100 (2023 16:53)  HR: 95 (2023 10:56) (60 - 99)  BP: 126/77 (2023 10:56) (119/48 - 168/68)  BP(mean): --  RR: 18 (2023 10:56) (15 - 18)  SpO2: 98% (2023 10:56) (94% - 98%)  Parameters below as of 2023 10:56  Patient On (Oxygen Delivery Method): room air  Supplemental O2:on RA now    PHYSICAL EXAM    General:  84 y/o   female awake, alert, sitting upright in bed now, pleasant and cooperative, in NAD  HEENT: conj pink, sclerae anicteric, PERRLA, no oral lesions noted but mucosa is dry  Neck: supple, no nodes noted  Heart: RR  Lungs: decreased BS at the bases bilaterally  Abdomen: soft, BS+, nontender to palpation, no masses or HS-megaly detected  Back: no CVA or Spinal tenderness noted  Extremities: no edema LE's                    no calf tenderness noted  Skin: warm, dry, no rash noted        I&O's Summary :    2023 07:01  -  2023 07:00  --------------------------------------------------------  IN: 120 mL / OUT: 0 mL / NET: 120 mL        Impression:  84 y/o female born in [place] and in the  x 15yrs. with hx of     Suggestions: Will therefore continue current ab rx with Rocephin pending Blood and Urien Cx results.  Follow-up temps and labs closely.  Discussed in detail with patient and her daughter at the bedside.  Thanks, will follow with you. HPI:  82 y/o female born in [place] and [place] the [place]rs[place] with hx of HTN,  CAD, s/p Coronary Stent placement, Pulmonary Fibrosis, maintained on 5 mg of Prednisone/day, Uric Acid Renal Calculi, Pre-Diabetic, admitted to [facility] via the ER on 23 with c/o weakness and decreased po intake  x 2 days at home.  In the ER patient was found to be febrile to 101.7 and w/u revealed WBC's were elevated to 12,620 and u/a with micro revealed 11-25 WBC's and LE and Nitrites were positive.  Further w/u with CXR revealed some increased blunting of the Lt. base from a prior CXR in 2019 and a Renal Sono was done and showed no hydronephrosis or renal calculii.  2 Blood Cx's and a Urine Cx were obtained and she was begun empirically on Rocephin.  Patient c/o poor appetite still but no c/o chills now and no c/o SOB, cp, or abdominal pain.  Mild intermittent dry cough reported but unable to expectorate any mucous.  No c/o HA, and no N/V/D reported.      Allergies :    No Known Allergies    Intolerances - none        Social History:  Tobacco: negative  Alcohol: none  Recent Travel: none  Immunizations: Rec'd the COVID-19 Vaccine in                          Rec'd the Influenza Vaccine for 5841-1219 Season                         ? rec'd the Pneumococcal Vaccine  Lives with her daughter and her family  Born in [place] - in the  x 15yrs.  Ambulates without assistance at home  No Pets      MEDICATIONS  (STANDING):  allopurinol 100 milliGRAM(s) Oral daily  aspirin enteric coated 81 milliGRAM(s) Oral daily  atorvastatin 20 milliGRAM(s) Oral at bedtime  cefTRIAXone   IVPB 1000 milliGRAM(s) IV Intermittent every 24 hours  dextrose 5%. 1000 milliLiter(s) (50 mL/Hr) IV Continuous <Continuous>  dextrose 5%. 1000 milliLiter(s) (100 mL/Hr) IV Continuous <Continuous>  dextrose 50% Injectable 25 Gram(s) IV Push once  dextrose 50% Injectable 12.5 Gram(s) IV Push once  dextrose 50% Injectable 25 Gram(s) IV Push once  glucagon  Injectable 1 milliGRAM(s) IntraMuscular once  heparin   Injectable 5000 Unit(s) SubCutaneous every 12 hours  insulin lispro (ADMELOG) corrective regimen sliding scale   SubCutaneous three times a day before meals  metoprolol tartrate 25 milliGRAM(s) Oral two times a day  predniSONE   Tablet 5 milliGRAM(s) Oral daily    MEDICATIONS  (PRN):  acetaminophen     Tablet .. 650 milliGRAM(s) Oral every 6 hours PRN Temp greater or equal to 38C (100.4F)  dextrose Oral Gel 15 Gram(s) Oral once PRN Blood Glucose LESS THAN 70 milliGRAM(s)/deciliter      LABS:  CBC Full  -  ( 2023 14:47 )  WBC Count : 12.62 K/uL  RBC Count : 4.84 M/uL  Hemoglobin : 13.2 g/dL  Hematocrit : 42.8 %  Platelet Count - Automated : 234 K/uL  Mean Cell Volume : 88.4 fl  Mean Cell Hemoglobin : 27.3 pg  Mean Cell Hemoglobin Concentration : 30.8 g/dL  Auto Neutrophil # : 10.81 K/uL  Auto Lymphocyte # : 0.72 K/uL  Auto Monocyte # : 0.98 K/uL  Auto Eosinophil # : 0.04 K/uL  Auto Basophil # : 0.02 K/uL  Auto Neutrophil % : 85.6 %  Auto Lymphocyte % : 5.7 %  Auto Monocyte % : 7.8 %  Auto Eosinophil % : 0.3 %  Auto Basophil % : 0.2 %        135  |  102  |  19  ----------------------------<  151<H>  4.4   |  28  |  1.13    Ca    11.1<H>      2023 14:47    TPro  7.5  /  Alb  3.0<L>  /  TBili  1.0  /  DBili  x   /  AST  19  /  ALT  27  /  AlkPhos  123<H>      LIVER FUNCTIONS - ( 2023 14:47 )  Alb: 3.0 g/dL / Pro: 7.5 gm/dL / ALK PHOS: 123 U/L / ALT: 27 U/L / AST: 19 U/L / GGT: x           PT/INR - ( 2023 14:47 )   PT: 13.2 sec;   INR: 1.11 ratio     PTT - ( 2023 14:47 )  PTT:27.3 sec    Urinalysis Basic - ( 2023 14:47 )  Color: Yellow / Appearance: Slightly Turbid / S.010 / pH: x  Gluc: x / Ketone: Negative  / Bili: Negative / Urobili: Negative mg/dL   Blood: x / Protein: 15 mg/dL / Nitrite: Positive   Leuk Esterase: Moderate / RBC: 3-5 /HPF / WBC 11-25   Sq Epi: x / Non Sq Epi: x / Bacteria: TNTC    A1C with Estimated Average Glucose (23 @ 06:10)    A1C with Estimated Average Glucose Result: 6.0:   Estimated Average Glucose: 126:     Lactate, Blood (23 @ 14:47)    Lactate, Blood: 1.4 mmol/L      MICROBIOLOGY:  Respiratory Viral Panel with COVID-19 by JOSE ANTONIO (23 @ 14:47)    Rapid RVP Result: [facility]   [facility]: [facility]: This Respiratory Panel uses polymerase chain reaction (PCR) to detect for  adenovirus; coronavirus (HKU1, NL63, 229E, OC43); human metapneumovirus  (hMPV); human enterovirus/rhinovirus (Entero/RV); influenza A; influenza  A/H1; influenza A/H3; influenza A/H1-2009; influenza B; parainfluenza  viruses 1, 2, 3, 4; respiratory syncytial virus; Mycoplasma pneumoniae;  Chlamydophila pneumoniae; and SARS-CoV-2.        RADIOLOGY:  < from: US Renal (23 @ 13:58) >  ACC: [id number] EXAM:  US KIDNEY(S)   ORDERED BY: JIE AMIN   PROCEDURE DATE:  2023    INTERPRETATION:  CLINICAL INFORMATION: Urinary tract infection  COMPARISON: 2019  TECHNIQUE: Sonography of the kidneys and bladder.  FINDINGS:  Right kidney: 8.2 cm. Cortical thinning. No renal mass, hydronephrosis or   calculi.  Left kidney: 9.0 cm. No renal mass, hydronephrosis or calculi.  Urinary bladder: Within normal limits.  IMPRESSION:  No hydronephrosis.     < from: Xray Chest 1 View- PORTABLE-Urgent (23 @ 15:48) >  ACC: [id number] EXAM:  XR CHEST PORTABLE URGENT 1V   ORDERED BY: CHEIKH LENZ   PROCEDURE DATE:  2023    INTERPRETATION:  AP chest on 2023 at 3:08 PM. Patient has sepsis.  Heart magnified by technique.  There is slight blunting of left base laterally increased from 2019.  IMPRESSION: Slight blunting left base laterally at this time.         Vital Signs Last 24 Hrs  T(C): 36.7 (2023 10:56), Max: 37.8 (2023 16:53)  T(F): 98 (2023 10:56), Max: 100 (2023 16:53)  HR: 95 (2023 10:56) (60 - 99)  BP: 126/77 (2023 10:56) (119/48 - 168/68)  BP(mean): --  RR: 18 (2023 10:56) (15 - 18)  SpO2: 98% (2023 10:56) (94% - 98%)  Parameters below as of 2023 10:56  Patient On (Oxygen Delivery Method): room air  Supplemental O2:on RA now    PHYSICAL EXAM    General:  82 y/o   female awake, alert, sitting upright in bed now, pleasant and cooperative, in NAD  HEENT: conj pink, sclerae anicteric, PERRLA, no oral lesions noted but mucosa is dry  Neck: supple, no nodes noted  Heart: RR  Lungs: decreased BS at the bases bilaterally  Abdomen: soft, BS+, nontender to palpation, no masses or HS-megaly detected  Back: no CVA or Spinal tenderness noted  Extremities: no edema LE's                    no calf tenderness noted  Skin: warm, dry, no rash noted        I&O's Summary :    2023 07:01  -  2023 07:00  --------------------------------------------------------  IN: 120 mL / OUT: 0 mL / NET: 120 mL        Impression:  82 y/o female born in [place] and in the  x 15yrs. with hx of     Suggestions: Will therefore continue current ab rx with Rocephin pending Blood and Urine Cx results.  Follow-up temps and labs closely.  Discussed in detail with patient and her daughter at the bedside.  Thanks, will follow with you. HPI:  84 y/o female born in [place] with hx of HTN,  CAD, s/p Coronary Stent placement, Pulmonary Fibrosis, maintained on 5 mg of Prednisone/day, Uric Acid Renal Calculi, Pre-Diabetic, admitted to [facility] via the ER on 23 with c/o weakness and decreased po intake  x 2 days at home.  In the ER patient was found to be febrile to 101.7 and w/u revealed WBC's were elevated to 12,620 and u/a with micro revealed 11-25 WBC's and LE and Nitrites were positive.  Further w/u with CXR revealed some increased blunting of the Lt. base from a prior CXR in 2019 and a Renal Sono was done and showed no hydronephrosis or renal calculii.  2 Blood Cx's and a Urine Cx were obtained and she was begun empirically on Rocephin.  Respiratory Viral Panel was also done and resulted as Negative. Patient c/o poor appetite still but no c/o chills now and no c/o SOB, cp, or abdominal pain.  Mild intermittent dry cough reported but unable to expectorate any mucous.  No c/o HA, and no N/V/D reported.      Allergies :    No Known Allergies    Intolerances - none        Social History:  Tobacco: negative  Alcohol: none  Recent Travel: none  Immunizations: Rec'd the COVID-19 Vaccine in                          Rec'd the Influenza Vaccine for 4333-9326 Season                         ? rec'd the Pneumococcal Vaccine  Lives with her daughter and her family  Born in [place] - in the  x 15yrs.  Ambulates without assistance at home  No Pets      MEDICATIONS  (STANDING):  allopurinol 100 milliGRAM(s) Oral daily  aspirin enteric coated 81 milliGRAM(s) Oral daily  atorvastatin 20 milliGRAM(s) Oral at bedtime  cefTRIAXone   IVPB 1000 milliGRAM(s) IV Intermittent every 24 hours  dextrose 5%. 1000 milliLiter(s) (50 mL/Hr) IV Continuous <Continuous>  dextrose 5%. 1000 milliLiter(s) (100 mL/Hr) IV Continuous <Continuous>  dextrose 50% Injectable 25 Gram(s) IV Push once  dextrose 50% Injectable 12.5 Gram(s) IV Push once  dextrose 50% Injectable 25 Gram(s) IV Push once  glucagon  Injectable 1 milliGRAM(s) IntraMuscular once  heparin   Injectable 5000 Unit(s) SubCutaneous every 12 hours  insulin lispro (ADMELOG) corrective regimen sliding scale   SubCutaneous three times a day before meals  metoprolol tartrate 25 milliGRAM(s) Oral two times a day  predniSONE   Tablet 5 milliGRAM(s) Oral daily    MEDICATIONS  (PRN):  acetaminophen     Tablet .. 650 milliGRAM(s) Oral every 6 hours PRN Temp greater or equal to 38C (100.4F)  dextrose Oral Gel 15 Gram(s) Oral once PRN Blood Glucose LESS THAN 70 milliGRAM(s)/deciliter      LABS:  CBC Full  -  ( 2023 14:47 )  WBC Count : 12.62 K/uL  RBC Count : 4.84 M/uL  Hemoglobin : 13.2 g/dL  Hematocrit : 42.8 %  Platelet Count - Automated : 234 K/uL  Mean Cell Volume : 88.4 fl  Mean Cell Hemoglobin : 27.3 pg  Mean Cell Hemoglobin Concentration : 30.8 g/dL  Auto Neutrophil # : 10.81 K/uL  Auto Lymphocyte # : 0.72 K/uL  Auto Monocyte # : 0.98 K/uL  Auto Eosinophil # : 0.04 K/uL  Auto Basophil # : 0.02 K/uL  Auto Neutrophil % : 85.6 %  Auto Lymphocyte % : 5.7 %  Auto Monocyte % : 7.8 %  Auto Eosinophil % : 0.3 %  Auto Basophil % : 0.2 %        135  |  102  |  19  ----------------------------<  151<H>  4.4   |  28  |  1.13    Ca    11.1<H>      2023 14:47    TPro  7.5  /  Alb  3.0<L>  /  TBili  1.0  /  DBili  x   /  AST  19  /  ALT  27  /  AlkPhos  123<H>  -    LIVER FUNCTIONS - ( 2023 14:47 )  Alb: 3.0 g/dL / Pro: 7.5 gm/dL / ALK PHOS: 123 U/L / ALT: 27 U/L / AST: 19 U/L / GGT: x           PT/INR - ( 2023 14:47 )   PT: 13.2 sec;   INR: 1.11 ratio     PTT - ( 2023 14:47 )  PTT:27.3 sec    Urinalysis Basic - ( 2023 14:47 )  Color: Yellow / Appearance: Slightly Turbid / S.010 / pH: x  Gluc: x / Ketone: Negative  / Bili: Negative / Urobili: Negative mg/dL   Blood: x / Protein: 15 mg/dL / Nitrite: Positive   Leuk Esterase: Moderate / RBC: 3-5 /HPF / WBC 11-25   Sq Epi: x / Non Sq Epi: x / Bacteria: TNTC    A1C with Estimated Average Glucose (23 @ 06:10)    A1C with Estimated Average Glucose Result: 6.0:   Estimated Average Glucose: 126:     Lactate, Blood (23 @ 14:47)    Lactate, Blood: 1.4 mmol/L      MICROBIOLOGY:  Respiratory Viral Panel with COVID-19 by JOSE ANTONIO (23 @ 14:47)    Rapid RVP Result: NotDete   SARS-CoV-2: NotDete: This Respiratory Panel uses polymerase chain reaction (PCR) to detect for  adenovirus; coronavirus (HKU1, NL63, 229E, OC43); human metapneumovirus  (hMPV); human enterovirus/rhinovirus (Entero/RV); influenza A; influenza  A/H1; influenza A/H3; influenza A/H1-2009; influenza B; parainfluenza  viruses 1, 2, 3, 4; respiratory syncytial virus; Mycoplasma pneumoniae;  Chlamydophila pneumoniae; and SARS-CoV-2.        RADIOLOGY:  < from: US Renal (23 @ 13:58) >  ACC: [id number] EXAM:  US KIDNEY(S)   ORDERED BY: JIE AMIN   PROCEDURE DATE:  2023    INTERPRETATION:  CLINICAL INFORMATION: Urinary tract infection  COMPARISON: 2019  TECHNIQUE: Sonography of the kidneys and bladder.  FINDINGS:  Right kidney: 8.2 cm. Cortical thinning. No renal mass, hydronephrosis or   calculi.  Left kidney: 9.0 cm. No renal mass, hydronephrosis or calculi.  Urinary bladder: Within normal limits.  IMPRESSION:  No hydronephrosis.     < from: Xray Chest 1 View- PORTABLE-Urgent (23 @ 15:48) >  ACC: [id number] EXAM:  XR CHEST PORTABLE URGENT 1V   ORDERED BY: CHEIKH LENZ   PROCEDURE DATE:  2023    INTERPRETATION:  AP chest on 2023 at 3:08 PM. Patient has sepsis.  Heart magnified by technique.  There is slight blunting of left base laterally increased from 2019.  IMPRESSION: Slight blunting left base laterally at this time.         Vital Signs Last 24 Hrs  T(C): 36.7 (2023 10:56), Max: 37.8 (2023 16:53)  T(F): 98 (2023 10:56), Max: 100 (2023 16:53)  HR: 95 (2023 10:56) (60 - 99)  BP: 126/77 (2023 10:56) (119/48 - 168/68)  BP(mean): --  RR: 18 (2023 10:56) (15 - 18)  SpO2: 98% (2023 10:56) (94% - 98%)  Parameters below as of 2023 10:56  Patient On (Oxygen Delivery Method): room air  Supplemental O2:on RA now    PHYSICAL EXAM    General:  84 y/o   female awake, alert, sitting upright in bed now, pleasant and cooperative, in NAD  HEENT: conj pink, sclerae anicteric, PERRLA, no oral lesions noted but mucosa is dry  Neck: supple, no nodes noted  Heart: RR  Lungs: decreased BS at the bases bilaterally  Abdomen: soft, BS+, nontender to palpation, no masses or HS-megaly detected  Back: no CVA or Spinal tenderness noted  Extremities: no edema LE's                    no calf tenderness noted  Skin: warm, dry, no rash noted        I&O's Summary :    2023 07:01  -  2023 07:00  --------------------------------------------------------  IN: 120 mL / OUT: 0 mL / NET: 120 mL        Impression:  84 y/o female born in [place] and in the  x 15yrs. with hx of HTN, CAD, s/p Coronary Stent placement, Pulmonary Fibrosis, maintained on 5 mg of Prednisone/day, Uric Acid Renal Calculi, Pre-Diabetic, admitted to [facility] via the ER on 23 with c/o weakness and decreased po intake  x 2 days at home.  In the ER patient was found to be febrile to 101.7 and w/u revealed WBC's were elevated to 12.620 and u/a with micro revealed 11-25 WBC's and LE and Nitrites were positive.  Further w/u with CXR revealed some increased blunting of the Lt. base from a prior CXR in 2019 and a Renal Sono was done and showed no hydronephrosis or renal calculii.  2 Blood Cx's and a Urine Cx were obtained and she was begun empirically on Rocephin.  Respiratory Viral Panel was also done and resulted as Negative    Suggestions: Will therefore continue current ab rx with Rocephin for Sepsis likely due to  source with ? underlying PNA? pending Blood and Urine Cx results.  Follow-up temps and labs closely. Monitor O2 Sat's and follow-up CXR.  Discussed in detail with patient and her daughter at the bedside.  Thanks, will follow with you.

## 2023-06-05 NOTE — PROGRESS NOTE ADULT - SUBJECTIVE AND OBJECTIVE BOX
INTERVAL HPI/OVERNIGHT EVENTS:        REVIEW OF SYSTEMS:  CONSTITUTIONAL:  feels much  better  no  complaints    NECK: No pain or stiffnes  RESPIRATORY: No SOB   CARDIOVASCULAR: No chest pain, palpitations, dizziness,   GASTROINTESTINAL: No abdominal pain. No nausea, vomiting,  appetite  poor  NEUROLOGICAL: No headaches, no  blurry  vision no  dizziness  SKIN: No itching,   MUSCULOSKELETAL: No pain    MEDICATION:  acetaminophen     Tablet .. 650 milliGRAM(s) Oral every 6 hours PRN  allopurinol 100 milliGRAM(s) Oral daily  aspirin enteric coated 81 milliGRAM(s) Oral daily  atorvastatin 20 milliGRAM(s) Oral at bedtime  cefTRIAXone   IVPB 1000 milliGRAM(s) IV Intermittent every 24 hours  dextrose 5%. 1000 milliLiter(s) IV Continuous <Continuous>  dextrose 5%. 1000 milliLiter(s) IV Continuous <Continuous>  dextrose 50% Injectable 25 Gram(s) IV Push once  dextrose 50% Injectable 12.5 Gram(s) IV Push once  dextrose 50% Injectable 25 Gram(s) IV Push once  dextrose Oral Gel 15 Gram(s) Oral once PRN  glucagon  Injectable 1 milliGRAM(s) IntraMuscular once  heparin   Injectable 5000 Unit(s) SubCutaneous every 12 hours  insulin lispro (ADMELOG) corrective regimen sliding scale   SubCutaneous three times a day before meals  metoprolol tartrate 25 milliGRAM(s) Oral two times a day  predniSONE   Tablet 5 milliGRAM(s) Oral daily    Vital Signs Last 24 Hrs  T(C): 36.2 (2023 05:22), Max: 38.7 (2023 14:13)  T(F): 97.2 (2023 05:22), Max: 101.7 (2023 14:13)  HR: 99 (2023 05:22) (60 - 129)  BP: 120/70 (2023 05:22) (119/48 - 168/68)  BP(mean): --  RR: 18 (2023 05:22) (15 - 18)  SpO2: 94% (2023 05:22) (94% - 98%)    Parameters below as of 2023 20:49  Patient On (Oxygen Delivery Method): room air        PHYSICAL EXAM:  GENERAL: NAD, well-groomed, well-developed  HEENT : Conjuntivae  clear sclerae anicteric  NECK: Supple, No JVD, Normal thyroid  NERVOUS SYSTEM:  Alert oriented   no  focal  deficits;   CHEST/LUNG: Clear    HEART: Regular rate and rhythm; No murmurs, rubs, or gallops  ABDOMEN: Soft, Nontender, Nondistended; Bowel sounds present  EXTREMITIES:  no  edema no  tenderness  SKIN: No rashes   LABS:                        13.2   12.62 )-----------( 234      ( 2023 14:47 )             42.8     06-04    135  |  102  |  19  ----------------------------<  151<H>  4.4   |  28  |  1.13    Ca    11.1<H>      2023 14:47    TPro  7.5  /  Alb  3.0<L>  /  TBili  1.0  /  DBili  x   /  AST  19  /  ALT  27  /  AlkPhos  123<H>  06-04    PT/INR - ( 2023 14:47 )   PT: 13.2 sec;   INR: 1.11 ratio         PTT - ( 2023 14:47 )  PTT:27.3 sec  Urinalysis Basic - ( 2023 14:47 )    Color: Yellow / Appearance: Slightly Turbid / S.010 / pH: x  Gluc: x / Ketone: Negative  / Bili: Negative / Urobili: Negative mg/dL   Blood: x / Protein: 15 mg/dL / Nitrite: Positive   Leuk Esterase: Moderate / RBC: 3-5 /HPF / WBC 11-25   Sq Epi: x / Non Sq Epi: x / Bacteria: TNTC      CAPILLARY BLOOD GLUCOSE      POCT Blood Glucose.: 119 mg/dL (2023 08:09)  POCT Blood Glucose.: 94 mg/dL (2023 21:36)  POCT Blood Glucose.: 125 mg/dL (2023 14:22)      RADIOLOGY & ADDITIONAL TESTS:    Imaging reports  Personally Reviewed:  [ ] YES  [ ] NO    Consultant(s) Notes Reviewed:  [ ] YES  [ ] NO    Care Discussed with Consultants/Other Providers [x ] YES  [ ] NO  imp  Problem/Plan - 1:  ·  Problem: Sepsis secondary to UTI.   ·  Plan: rocephin  ivf  renal bladder  sonogram.    Problem/Plan - 2:  ·  Problem: Prediabetes.   ·  Plan: diet insulin coverage.    Problem/Plan - 3:  ·  Problem: Stented coronary artery.   ·  Plan: asa lipitor  metoprol.    Problem/Plan - 4:  ·  Problem: HTN (hypertension).   ·  Plan: metoprolol.    Problem/Plan - 5:  ·  Problem: Uric acid renal calculus.   ·  Plan: allopurinol.    Problem/Plan - 6:  ·  Problem: Pulmonary fibrosis.   ·  Plan: prednisone.

## 2023-06-06 LAB
ALBUMIN SERPL ELPH-MCNC: 2.4 G/DL — LOW (ref 3.3–5)
ALP SERPL-CCNC: 106 U/L — SIGNIFICANT CHANGE UP (ref 40–120)
ALT FLD-CCNC: 26 U/L — SIGNIFICANT CHANGE UP (ref 12–78)
ANION GAP SERPL CALC-SCNC: 2 MMOL/L — LOW (ref 5–17)
AST SERPL-CCNC: 24 U/L — SIGNIFICANT CHANGE UP (ref 15–37)
BILIRUB SERPL-MCNC: 0.3 MG/DL — SIGNIFICANT CHANGE UP (ref 0.2–1.2)
BUN SERPL-MCNC: 17 MG/DL — SIGNIFICANT CHANGE UP (ref 7–23)
CALCIUM SERPL-MCNC: 10.4 MG/DL — HIGH (ref 8.5–10.1)
CHLORIDE SERPL-SCNC: 105 MMOL/L — SIGNIFICANT CHANGE UP (ref 96–108)
CO2 SERPL-SCNC: 30 MMOL/L — SIGNIFICANT CHANGE UP (ref 22–31)
CREAT SERPL-MCNC: 0.91 MG/DL — SIGNIFICANT CHANGE UP (ref 0.5–1.3)
CRP SERPL-MCNC: 79 MG/L — HIGH
EGFR: 63 ML/MIN/1.73M2 — SIGNIFICANT CHANGE UP
ERYTHROCYTE [SEDIMENTATION RATE] IN BLOOD: 65 MM/HR — HIGH (ref 0–20)
GLUCOSE SERPL-MCNC: 138 MG/DL — HIGH (ref 70–99)
HCT VFR BLD CALC: 37.8 % — SIGNIFICANT CHANGE UP (ref 34.5–45)
HGB BLD-MCNC: 11.7 G/DL — SIGNIFICANT CHANGE UP (ref 11.5–15.5)
MCHC RBC-ENTMCNC: 27.5 PG — SIGNIFICANT CHANGE UP (ref 27–34)
MCHC RBC-ENTMCNC: 31 G/DL — LOW (ref 32–36)
MCV RBC AUTO: 88.9 FL — SIGNIFICANT CHANGE UP (ref 80–100)
NRBC # BLD: 0 /100 WBCS — SIGNIFICANT CHANGE UP (ref 0–0)
PLATELET # BLD AUTO: 221 K/UL — SIGNIFICANT CHANGE UP (ref 150–400)
POTASSIUM SERPL-MCNC: 4.4 MMOL/L — SIGNIFICANT CHANGE UP (ref 3.5–5.3)
POTASSIUM SERPL-SCNC: 4.4 MMOL/L — SIGNIFICANT CHANGE UP (ref 3.5–5.3)
PROCALCITONIN SERPL-MCNC: 0.18 NG/ML — HIGH (ref 0.02–0.1)
PROT SERPL-MCNC: 6.6 GM/DL — SIGNIFICANT CHANGE UP (ref 6–8.3)
RBC # BLD: 4.25 M/UL — SIGNIFICANT CHANGE UP (ref 3.8–5.2)
RBC # FLD: 15 % — HIGH (ref 10.3–14.5)
SODIUM SERPL-SCNC: 137 MMOL/L — SIGNIFICANT CHANGE UP (ref 135–145)
WBC # BLD: 9.47 K/UL — SIGNIFICANT CHANGE UP (ref 3.8–10.5)
WBC # FLD AUTO: 9.47 K/UL — SIGNIFICANT CHANGE UP (ref 3.8–10.5)

## 2023-06-06 RX ADMIN — Medication 650 MILLIGRAM(S): at 17:47

## 2023-06-06 RX ADMIN — ATORVASTATIN CALCIUM 20 MILLIGRAM(S): 80 TABLET, FILM COATED ORAL at 22:27

## 2023-06-06 RX ADMIN — Medication 25 MILLIGRAM(S): at 05:38

## 2023-06-06 RX ADMIN — Medication 100 MILLIGRAM(S): at 13:01

## 2023-06-06 RX ADMIN — CEFTRIAXONE 100 MILLIGRAM(S): 500 INJECTION, POWDER, FOR SOLUTION INTRAMUSCULAR; INTRAVENOUS at 15:10

## 2023-06-06 RX ADMIN — Medication 1 TABLET(S): at 05:45

## 2023-06-06 RX ADMIN — Medication 1 TABLET(S): at 17:56

## 2023-06-06 RX ADMIN — Medication 81 MILLIGRAM(S): at 13:04

## 2023-06-06 RX ADMIN — HEPARIN SODIUM 5000 UNIT(S): 5000 INJECTION INTRAVENOUS; SUBCUTANEOUS at 17:56

## 2023-06-06 RX ADMIN — Medication 25 MILLIGRAM(S): at 17:49

## 2023-06-06 RX ADMIN — Medication 650 MILLIGRAM(S): at 18:23

## 2023-06-06 RX ADMIN — Medication 5 MILLIGRAM(S): at 05:37

## 2023-06-06 RX ADMIN — HEPARIN SODIUM 5000 UNIT(S): 5000 INJECTION INTRAVENOUS; SUBCUTANEOUS at 05:33

## 2023-06-06 NOTE — PHYSICAL THERAPY INITIAL EVALUATION ADULT - ADDITIONAL COMMENTS
pt lives with her 2 dtr and son in-law in a private home with 3 steps to enter and no steps inside, pt dtr is her CDPAP, pt PLOF is indep with hand held assist for ambulation and indep in ADL. pt aaox4, speaks [place] lang, prefer her dr to interpret. pt Vital sign is 141/70 mmhg. assisted to bed and left comfortably.

## 2023-06-06 NOTE — PROGRESS NOTE ADULT - SUBJECTIVE AND OBJECTIVE BOX
TMAX - 98.7    On day # 3 Rocephin    Vital Signs Last 24 Hrs  T(C): 36.9 (06 Jun 2023 17:06), Max: 37 (06 Jun 2023 00:16)  T(F): 98.4 (06 Jun 2023 17:06), Max: 98.6 (06 Jun 2023 00:16)  HR: 95 (06 Jun 2023 17:06) (81 - 97)  BP: 139/73 (06 Jun 2023 17:06) (128/77 - 141/76)  RR: 18 (06 Jun 2023 17:06) (18 - 18)  SpO2: 98% (06 Jun 2023 17:06) (96% - 98%)  Parameters below as of 06 Jun 2023 17:06  Patient On (Oxygen Delivery Method): room air  Supplemental O2:  on RA    Awake, alert, feeling better today.  No c/o cp or abdominal pain and no c/o dysuria or difficulty voiding now.  Appetite improving and trying to drink more liquids.  No c/o SOB and O2 Sat's remain between 94 -98% on RA.    PHYSICAL EXAM  General:  84 y/o   female awake, alert, sitting upright in bed now,  on RA, pleasant and cooperative, in NAD  HEENT: conj pink, sclerae anicteric, PERRLA, no oral lesions noted and mucosa is now moist  Neck: supple, no nodes noted  Heart: RR  Lungs: few rales bilaterally a the bases to about 1/3 of both lung fields  Abdomen: soft, BS+, nontender to palpation, no masses or HS-megaly detected  Back: no CVA or Spinal tenderness noted  Extremities: no edema LE's                    no calf tenderness noted  Skin: warm, dry, no rash noted      I&O's Summary :    05 Jun 2023 07:01  -  06 Jun 2023 07:00  --------------------------------------------------------  IN: 280 mL / OUT: 0 mL / NET: 280 mL      LABS:  CBC Full  -  ( 06 Jun 2023 08:22 )  WBC Count : 9.47 K/uL  RBC Count : 4.25 M/uL  Hemoglobin : 11.7 g/dL  Hematocrit : 37.8 %  Platelet Count - Automated : 221 K/uL  Mean Cell Volume : 88.9 fl  Mean Cell Hemoglobin : 27.5 pg  Mean Cell Hemoglobin Concentration : 31.0 g/dL  Auto Neutrophil # : x  Auto Lymphocyte # : x  Auto Monocyte # : x  Auto Eosinophil # : x  Auto Basophil # : x  Auto Neutrophil % : x  Auto Lymphocyte % : x  Auto Monocyte % : x  Auto Eosinophil % : x  Auto Basophil % : x    06-06    137  |  105  |  17  ----------------------------<  138<H>  4.4   |  30  |  0.91    Ca    10.4<H>      06 Jun 2023 08:22    TPro  6.6  /  Alb  2.4<L>  /  TBili  0.3  /  DBili  x   /  AST  24  /  ALT  26  /  AlkPhos  106  06-06    LIVER FUNCTIONS - ( 06 Jun 2023 08:22 )  Alb: 2.4 g/dL / Pro: 6.6 gm/dL / ALK PHOS: 106 U/L / ALT: 26 U/L / AST: 24 U/L / GGT: x           Sedimentation Rate, Erythrocyte: 65 mm/hr (06-06 @ 08:22)    C-Reactive Protein, Serum (06.06.23 @ 08:22)    C-Reactive Protein, Serum: 79 mg/L    Procalcitonin, Serum (06.06.23 @ 08:22)    Procalcitonin, Serum: 0.18:       A1C with Estimated Average Glucose (06.05.23 @ 06:10)    A1C with Estimated Average Glucose Result: 6.0:   Estimated Average Glucose: 126:     Lactate, Blood (06.04.23 @ 14:47)    Lactate, Blood: 1.4 mmol/L      MICROBIOLOGY:  Specimen Source: .Blood Blood-Peripheral (06-04 @ 14:57)  Culture Results:   No growth to date. (06-04 @ 14:57)    Specimen Source: .Blood Blood-Peripheral (06-04 @ 14:47)  Culture Results:   No growth to date. (06-04 @ 14:47)    Specimen Source: Clean Catch Clean Catch (Midstream) (06-04 @ 14:47)  Culture Results:   >100,000 CFU/ml Escherichia coli (06-04 @ 14:47)      Respiratory Viral Panel with COVID-19 by JOSE ANTONIO (06.04.23 @ 14:47)    Rapid RVP Result: [facility]   SARS-CoV-2: [facility]: This Respiratory Panel uses polymerase chain reaction (PCR) to detect for  adenovirus; coronavirus (HKU1, NL63, 229E, OC43); human metapneumovirus  (hMPV); human enterovirus/rhinovirus (Entero/RV); influenza A; influenza  A/H1; influenza A/H3; influenza A/H1-2009; influenza B; parainfluenza  viruses 1, 2, 3, 4; respiratory syncytial virus; Mycopla      RADIOLOGY:  < from: US Renal (06.05.23 @ 13:58) >  ACC: [id number] EXAM:  US KIDNEY(S)   ORDERED BY: JIE AMIN   PROCEDURE DATE:  06/05/2023    INTERPRETATION:  CLINICAL INFORMATION: Urinary tract infection  COMPARISON: 9/20/2019  TECHNIQUE: Sonography of the kidneys and bladder.  FINDINGS:  Right kidney: 8.2 cm. Cortical thinning. No renal mass, hydronephrosis or   calculi.  Left kidney: 9.0 cm. No renal mass, hydronephrosis or calculi.  Urinary bladder: Within normal limits.  IMPRESSION:  No hydronephrosis.     < from: Xray Chest 1 View- PORTABLE-Urgent (06.04.23 @ 15:48) >  ACC: [id number] EXAM:  XR CHEST PORTABLE URGENT 1V   ORDERED BY: CHEIKH LENZ   PROCEDURE DATE:  06/04/2023    INTERPRETATION:  AP chest on June 4, 2023 at 3:08 PM. Patient has sepsis.  Heart magnified by technique.  There is slight blunting of left base laterally increased from September 30, 2019.  IMPRESSION: Slight blunting left base laterally at this time.    Impression:  84 y/o female  born in [place] with hx of HTN,  CAD, s/p Coronary Stent placement, Pulmonary Fibrosis, maintained on 5 mg of Prednisone/day, Uric Acid Renal Calculi, Pre-Diabetic, admitted to [facility] via the ER on 6/4/23 with c/o weakness and decreased po intake  x 2 days at home.  In the ER patient was found to be febrile to 101.7 and w/u revealed WBC's were elevated to 12.620    Blood Cx's x 2 from 6/4/23 remian negative thus far and now Urine Cx is growing >100,000 EColi with sensitivities pending.  Noted WBC's are WNL but ESR, CRP, and Procalcitonin are elevated.    Suggestions:  Will therefore continue current ab rx with Rocephin for rx of Sepsis due to EColi UTI  and ? underlying PNA ? pending further Blood and Urine Cx results.  Follow-up temps and labs closely and repeat CXR in AM. Add Bacid while on ab rx. Discussed in detail with patient and her daughter at the bedside.  TMAX - 98.7    On day # 3 Rocephin    Vital Signs Last 24 Hrs  T(C): 36.9 (06 Jun 2023 17:06), Max: 37 (06 Jun 2023 00:16)  T(F): 98.4 (06 Jun 2023 17:06), Max: 98.6 (06 Jun 2023 00:16)  HR: 95 (06 Jun 2023 17:06) (81 - 97)  BP: 139/73 (06 Jun 2023 17:06) (128/77 - 141/76)  RR: 18 (06 Jun 2023 17:06) (18 - 18)  SpO2: 98% (06 Jun 2023 17:06) (96% - 98%)  Parameters below as of 06 Jun 2023 17:06  Patient On (Oxygen Delivery Method): room air  Supplemental O2:  on RA    Awake, alert, feeling better today.  No c/o cp or abdominal pain and no c/o dysuria or difficulty voiding now.  Appetite improving and trying to drink more liquids.  No c/o SOB and O2 Sat's remain between 94 -98% on RA.    PHYSICAL EXAM  General:  84 y/o   female awake, alert, sitting upright in bed now,  on RA, pleasant and cooperative, in NAD  HEENT: conj pink, sclerae anicteric, PERRLA, no oral lesions noted and mucosa is now moist  Neck: supple, no nodes noted  Heart: RR  Lungs: few rales bilaterally a the bases to about 1/3 of both lung fields  Abdomen: soft, BS+, nontender to palpation, no masses or HS-megaly detected  Back: no CVA or Spinal tenderness noted  Extremities: no edema LE's                    no calf tenderness noted  Skin: warm, dry, no rash noted      I&O's Summary :    05 Jun 2023 07:01  -  06 Jun 2023 07:00  --------------------------------------------------------  IN: 280 mL / OUT: 0 mL / NET: 280 mL      LABS:  CBC Full  -  ( 06 Jun 2023 08:22 )  WBC Count : 9.47 K/uL  RBC Count : 4.25 M/uL  Hemoglobin : 11.7 g/dL  Hematocrit : 37.8 %  Platelet Count - Automated : 221 K/uL  Mean Cell Volume : 88.9 fl  Mean Cell Hemoglobin : 27.5 pg  Mean Cell Hemoglobin Concentration : 31.0 g/dL  Auto Neutrophil # : x  Auto Lymphocyte # : x  Auto Monocyte # : x  Auto Eosinophil # : x  Auto Basophil # : x  Auto Neutrophil % : x  Auto Lymphocyte % : x  Auto Monocyte % : x  Auto Eosinophil % : x  Auto Basophil % : x    06-06    137  |  105  |  17  ----------------------------<  138<H>  4.4   |  30  |  0.91    Ca    10.4<H>      06 Jun 2023 08:22    TPro  6.6  /  Alb  2.4<L>  /  TBili  0.3  /  DBili  x   /  AST  24  /  ALT  26  /  AlkPhos  106  06-06    LIVER FUNCTIONS - ( 06 Jun 2023 08:22 )  Alb: 2.4 g/dL / Pro: 6.6 gm/dL / ALK PHOS: 106 U/L / ALT: 26 U/L / AST: 24 U/L / GGT: x           Sedimentation Rate, Erythrocyte: 65 mm/hr (06-06 @ 08:22)    C-Reactive Protein, Serum (06.06.23 @ 08:22)    C-Reactive Protein, Serum: 79 mg/L    Procalcitonin, Serum (06.06.23 @ 08:22)    Procalcitonin, Serum: 0.18:       A1C with Estimated Average Glucose (06.05.23 @ 06:10)    A1C with Estimated Average Glucose Result: 6.0:   Estimated Average Glucose: 126:     Lactate, Blood (06.04.23 @ 14:47)    Lactate, Blood: 1.4 mmol/L      MICROBIOLOGY:  Specimen Source: .Blood Blood-Peripheral (06-04 @ 14:57)  Culture Results:   No growth to date. (06-04 @ 14:57)    Specimen Source: .Blood Blood-Peripheral (06-04 @ 14:47)  Culture Results:   No growth to date. (06-04 @ 14:47)    Specimen Source: Clean Catch Clean Catch (Midstream) (06-04 @ 14:47)  Culture Results:   >100,000 CFU/ml Escherichia coli (06-04 @ 14:47)      Respiratory Viral Panel with COVID-19 by JOSE ANTONIO (06.04.23 @ 14:47)    Rapid RVP Result: [facility]   SARS-CoV-2: [facility]: This Respiratory Panel uses polymerase chain reaction (PCR) to detect for  adenovirus; coronavirus (HKU1, NL63, 229E, OC43); human metapneumovirus  (hMPV); human enterovirus/rhinovirus (Entero/RV); influenza A; influenza  A/H1; influenza A/H3; influenza A/H1-2009; influenza B; parainfluenza  viruses 1, 2, 3, 4; respiratory syncytial virus; Mycopla      RADIOLOGY:  < from: US Renal (06.05.23 @ 13:58) >  ACC: [id number] EXAM:  US KIDNEY(S)   ORDERED BY: JIE AMIN   PROCEDURE DATE:  06/05/2023    INTERPRETATION:  CLINICAL INFORMATION: Urinary tract infection  COMPARISON: 9/20/2019  TECHNIQUE: Sonography of the kidneys and bladder.  FINDINGS:  Right kidney: 8.2 cm. Cortical thinning. No renal mass, hydronephrosis or   calculi.  Left kidney: 9.0 cm. No renal mass, hydronephrosis or calculi.  Urinary bladder: Within normal limits.  IMPRESSION:  No hydronephrosis.     < from: Xray Chest 1 View- PORTABLE-Urgent (06.04.23 @ 15:48) >  ACC: [id number] EXAM:  XR CHEST PORTABLE URGENT 1V   ORDERED BY: CHEIKH LENZ   PROCEDURE DATE:  06/04/2023    INTERPRETATION:  AP chest on June 4, 2023 at 3:08 PM. Patient has sepsis.  Heart magnified by technique.  There is slight blunting of left base laterally increased from September 30, 2019.  IMPRESSION: Slight blunting left base laterally at this time.    Impression:  84 y/o female born in [place] and [place] the [place]rs[place] with hx of HTN,  CAD, s/p Coronary Stent placement, Pulmonary Fibrosis, maintained on 5 mg of Prednisone/day, Uric Acid Renal Calculi, Pre-Diabetic, admitted to [facility] via the ER on 6/4/23 with c/o weakness and decreased po intake  x 2 days at home.  In the ER patient was found to be febrile to 101.7 and w/u revealed WBC's were elevated to 12.620 and u/a with micro revealed 11-25 WBC's and LE and Nitrites were positive.  Further w/u with CXR revealed some increased blunting of the Lt. base from a prior CXR in 2019 and a Renal Sono was done and showed no hydronephrosis or renal calculii.  2 Blood Cx's and a Urine Cx were obtained and she was begun empirically on Rocephin.  Respiratory Viral Panel was also done and resulted as Negative. Blood Cx's x 2 from 6/4/23 are negative thus far and now Urine Cx is growing >100,000 EColi with sensitivities pending.  Noted WBC's are WNL today ( 6/6/23) but ESR, CRP, and Procalcitonin are elevated.    Suggestions:  Will therefore continue current ab rx with Rocephin for rx of Sepsis due to EColi UTI  and ? underlying PNA ? pending further Blood and Urine Cx results.  Follow-up temps and labs closely and repeat CXR in AM.  Add Bacid while on ab rx. Discussed in detail with patient and her daughter at the bedside.

## 2023-06-06 NOTE — PROGRESS NOTE ADULT - SUBJECTIVE AND OBJECTIVE BOX
INTERVAL HPI/OVERNIGHT EVENTS:        REVIEW OF SYSTEMS:  CONSTITUTIONAL:  feels  well  no  complaints    NECK: No pain or stiffnes  RESPIRATORY: No SOB   CARDIOVASCULAR: No chest pain, palpitations, dizziness,   GASTROINTESTINAL: No abdominal pain. No nausea, vomiting,   NEUROLOGICAL: No headaches, no  blurry  vision no  dizziness  SKIN: No itching,   MUSCULOSKELETAL: No pain    MEDICATION:  acetaminophen     Tablet .. 650 milliGRAM(s) Oral every 6 hours PRN  allopurinol 100 milliGRAM(s) Oral daily  aspirin enteric coated 81 milliGRAM(s) Oral daily  atorvastatin 20 milliGRAM(s) Oral at bedtime  cefTRIAXone   IVPB 1000 milliGRAM(s) IV Intermittent every 24 hours  dextrose 5%. 1000 milliLiter(s) IV Continuous <Continuous>  dextrose 5%. 1000 milliLiter(s) IV Continuous <Continuous>  dextrose 50% Injectable 25 Gram(s) IV Push once  dextrose 50% Injectable 25 Gram(s) IV Push once  dextrose 50% Injectable 12.5 Gram(s) IV Push once  dextrose Oral Gel 15 Gram(s) Oral once PRN  glucagon  Injectable 1 milliGRAM(s) IntraMuscular once  heparin   Injectable 5000 Unit(s) SubCutaneous every 12 hours  insulin lispro (ADMELOG) corrective regimen sliding scale   SubCutaneous three times a day before meals  lactobacillus acidophilus 1 Tablet(s) Oral every 12 hours  metoprolol tartrate 25 milliGRAM(s) Oral two times a day  predniSONE   Tablet 5 milliGRAM(s) Oral daily    Vital Signs Last 24 Hrs  T(C): 36.8 (2023 05:19), Max: 37.1 (2023 16:59)  T(F): 98.3 (2023 05:19), Max: 98.7 (2023 16:59)  HR: 95 (2023 05:19) (88 - 97)  BP: 141/76 (2023 05:19) (126/77 - 141/76)  BP(mean): --  RR: 18 (2023 05:19) (18 - 18)  SpO2: 96% (2023 05:19) (96% - 98%)    Parameters below as of 2023 05:19  Patient On (Oxygen Delivery Method): room air        PHYSICAL EXAM:  GENERAL: NAD, well-groomed, well-developed  HEENT : Conjuntivae  clear sclerae anicteric  NECK: Supple, No JVD, Normal thyroid  NERVOUS SYSTEM:  Alert oriented   no  focal  deficits;   CHEST/LUNG: Clear    HEART: Regular rate and rhythm; No murmurs, rubs, or gallops  ABDOMEN: Soft, Nontender, Nondistended; Bowel sounds present  EXTREMITIES:  no  edema no  tenderness  SKIN: No rashes   LABS:                        11.7   9.47  )-----------( 221      ( 2023 08:22 )             37.8     06-06    137  |  105  |  17  ----------------------------<  138<H>  4.4   |  30  |  0.91    Ca    10.4<H>      2023 08:22    TPro  6.6  /  Alb  2.4<L>  /  TBili  0.3  /  DBili  x   /  AST  24  /  ALT  26  /  AlkPhos  106  06-06    PT/INR - ( 2023 14:47 )   PT: 13.2 sec;   INR: 1.11 ratio         PTT - ( 2023 14:47 )  PTT:27.3 sec  Urinalysis Basic - ( 2023 14:47 )    Color: Yellow / Appearance: Slightly Turbid / S.010 / pH: x  Gluc: x / Ketone: Negative  / Bili: Negative / Urobili: Negative mg/dL   Blood: x / Protein: 15 mg/dL / Nitrite: Positive   Leuk Esterase: Moderate / RBC: 3-5 /HPF / WBC 11-25   Sq Epi: x / Non Sq Epi: x / Bacteria: TNTC      CAPILLARY BLOOD GLUCOSE      POCT Blood Glucose.: 121 mg/dL (2023 08:03)  POCT Blood Glucose.: 127 mg/dL (2023 21:26)  POCT Blood Glucose.: 131 mg/dL (2023 16:11)  POCT Blood Glucose.: 122 mg/dL (2023 11:29)      RADIOLOGY & ADDITIONAL TESTS:    Imaging reports  Personally Reviewed:  [ ] YES  [ ] NO    Consultant(s) Notes Reviewed:  [x ] YES  [ ] NO    Care Discussed with Consultants/Other Providers [x ] YES  [ ] NO  Problem/Plan - 1:  ·  Problem: Sepsis secondary to UTI.   ·  Plan: rocephin  ivf  renal bladder  sonogram.    Problem/Plan - 2:  ·  Problem: Prediabetes.   ·  Plan: diet insulin coverage.    Problem/Plan - 3:  ·  Problem: Stented coronary artery.   ·  Plan: asa lipitor  metoprol.    Problem/Plan - 4:  ·  Problem: HTN (hypertension).   ·  Plan: metoprolol.    Problem/Plan - 5:  ·  Problem: Uric acid renal calculus.   ·  Plan: allopurinol.    Problem/Plan - 6:  ·  Problem: Pulmonary fibrosis.   ·  Plan: prednisone.

## 2023-06-07 LAB
-  AMIKACIN: SIGNIFICANT CHANGE UP
-  AMOXICILLIN/CLAVULANIC ACID: SIGNIFICANT CHANGE UP
-  AMPICILLIN/SULBACTAM: SIGNIFICANT CHANGE UP
-  AMPICILLIN: SIGNIFICANT CHANGE UP
-  AZTREONAM: SIGNIFICANT CHANGE UP
-  CEFAZOLIN: SIGNIFICANT CHANGE UP
-  CEFEPIME: SIGNIFICANT CHANGE UP
-  CEFOXITIN: SIGNIFICANT CHANGE UP
-  CEFTRIAXONE: SIGNIFICANT CHANGE UP
-  CEFUROXIME: SIGNIFICANT CHANGE UP
-  CIPROFLOXACIN: SIGNIFICANT CHANGE UP
-  ERTAPENEM: SIGNIFICANT CHANGE UP
-  GENTAMICIN: SIGNIFICANT CHANGE UP
-  IMIPENEM: SIGNIFICANT CHANGE UP
-  LEVOFLOXACIN: SIGNIFICANT CHANGE UP
-  MEROPENEM: SIGNIFICANT CHANGE UP
-  NITROFURANTOIN: SIGNIFICANT CHANGE UP
-  PIPERACILLIN/TAZOBACTAM: SIGNIFICANT CHANGE UP
-  TOBRAMYCIN: SIGNIFICANT CHANGE UP
-  TRIMETHOPRIM/SULFAMETHOXAZOLE: SIGNIFICANT CHANGE UP
ANION GAP SERPL CALC-SCNC: 2 MMOL/L — LOW (ref 5–17)
BASOPHILS # BLD AUTO: 0.04 K/UL — SIGNIFICANT CHANGE UP (ref 0–0.2)
BASOPHILS NFR BLD AUTO: 0.5 % — SIGNIFICANT CHANGE UP (ref 0–2)
BUN SERPL-MCNC: 16 MG/DL — SIGNIFICANT CHANGE UP (ref 7–23)
CALCIUM SERPL-MCNC: 10.3 MG/DL — HIGH (ref 8.5–10.1)
CHLORIDE SERPL-SCNC: 106 MMOL/L — SIGNIFICANT CHANGE UP (ref 96–108)
CO2 SERPL-SCNC: 29 MMOL/L — SIGNIFICANT CHANGE UP (ref 22–31)
CREAT SERPL-MCNC: 0.87 MG/DL — SIGNIFICANT CHANGE UP (ref 0.5–1.3)
CRP SERPL-MCNC: 46 MG/L — HIGH
CULTURE RESULTS: SIGNIFICANT CHANGE UP
EGFR: 66 ML/MIN/1.73M2 — SIGNIFICANT CHANGE UP
EOSINOPHIL # BLD AUTO: 0.46 K/UL — SIGNIFICANT CHANGE UP (ref 0–0.5)
EOSINOPHIL NFR BLD AUTO: 6.2 % — HIGH (ref 0–6)
ERYTHROCYTE [SEDIMENTATION RATE] IN BLOOD: 54 MM/HR — HIGH (ref 0–20)
GLUCOSE SERPL-MCNC: 109 MG/DL — HIGH (ref 70–99)
HCT VFR BLD CALC: 39.7 % — SIGNIFICANT CHANGE UP (ref 34.5–45)
HGB BLD-MCNC: 12.3 G/DL — SIGNIFICANT CHANGE UP (ref 11.5–15.5)
IMM GRANULOCYTES NFR BLD AUTO: 0.5 % — SIGNIFICANT CHANGE UP (ref 0–0.9)
LYMPHOCYTES # BLD AUTO: 1.95 K/UL — SIGNIFICANT CHANGE UP (ref 1–3.3)
LYMPHOCYTES # BLD AUTO: 26.2 % — SIGNIFICANT CHANGE UP (ref 13–44)
MCHC RBC-ENTMCNC: 27.5 PG — SIGNIFICANT CHANGE UP (ref 27–34)
MCHC RBC-ENTMCNC: 31 G/DL — LOW (ref 32–36)
MCV RBC AUTO: 88.6 FL — SIGNIFICANT CHANGE UP (ref 80–100)
METHOD TYPE: SIGNIFICANT CHANGE UP
MONOCYTES # BLD AUTO: 0.74 K/UL — SIGNIFICANT CHANGE UP (ref 0–0.9)
MONOCYTES NFR BLD AUTO: 9.9 % — SIGNIFICANT CHANGE UP (ref 2–14)
NEUTROPHILS # BLD AUTO: 4.21 K/UL — SIGNIFICANT CHANGE UP (ref 1.8–7.4)
NEUTROPHILS NFR BLD AUTO: 56.7 % — SIGNIFICANT CHANGE UP (ref 43–77)
NRBC # BLD: 0 /100 WBCS — SIGNIFICANT CHANGE UP (ref 0–0)
ORGANISM # SPEC MICROSCOPIC CNT: SIGNIFICANT CHANGE UP
ORGANISM # SPEC MICROSCOPIC CNT: SIGNIFICANT CHANGE UP
PLATELET # BLD AUTO: 238 K/UL — SIGNIFICANT CHANGE UP (ref 150–400)
POTASSIUM SERPL-MCNC: 4.1 MMOL/L — SIGNIFICANT CHANGE UP (ref 3.5–5.3)
POTASSIUM SERPL-SCNC: 4.1 MMOL/L — SIGNIFICANT CHANGE UP (ref 3.5–5.3)
PROCALCITONIN SERPL-MCNC: 0.14 NG/ML — HIGH (ref 0.02–0.1)
RBC # BLD: 4.48 M/UL — SIGNIFICANT CHANGE UP (ref 3.8–5.2)
RBC # FLD: 14.8 % — HIGH (ref 10.3–14.5)
SODIUM SERPL-SCNC: 137 MMOL/L — SIGNIFICANT CHANGE UP (ref 135–145)
SPECIMEN SOURCE: SIGNIFICANT CHANGE UP
WBC # BLD: 7.44 K/UL — SIGNIFICANT CHANGE UP (ref 3.8–10.5)
WBC # FLD AUTO: 7.44 K/UL — SIGNIFICANT CHANGE UP (ref 3.8–10.5)

## 2023-06-07 PROCEDURE — 71045 X-RAY EXAM CHEST 1 VIEW: CPT | Mod: 26

## 2023-06-07 RX ADMIN — Medication 1 TABLET(S): at 18:02

## 2023-06-07 RX ADMIN — ATORVASTATIN CALCIUM 20 MILLIGRAM(S): 80 TABLET, FILM COATED ORAL at 21:33

## 2023-06-07 RX ADMIN — Medication 81 MILLIGRAM(S): at 11:30

## 2023-06-07 RX ADMIN — CEFTRIAXONE 100 MILLIGRAM(S): 500 INJECTION, POWDER, FOR SOLUTION INTRAMUSCULAR; INTRAVENOUS at 15:45

## 2023-06-07 RX ADMIN — Medication 25 MILLIGRAM(S): at 18:02

## 2023-06-07 RX ADMIN — HEPARIN SODIUM 5000 UNIT(S): 5000 INJECTION INTRAVENOUS; SUBCUTANEOUS at 18:03

## 2023-06-07 RX ADMIN — Medication 5 MILLIGRAM(S): at 06:19

## 2023-06-07 RX ADMIN — Medication 1: at 12:05

## 2023-06-07 RX ADMIN — Medication 100 MILLIGRAM(S): at 11:30

## 2023-06-07 RX ADMIN — Medication 1 TABLET(S): at 06:18

## 2023-06-07 RX ADMIN — HEPARIN SODIUM 5000 UNIT(S): 5000 INJECTION INTRAVENOUS; SUBCUTANEOUS at 06:15

## 2023-06-07 RX ADMIN — Medication 25 MILLIGRAM(S): at 06:19

## 2023-06-07 NOTE — PROGRESS NOTE ADULT - SUBJECTIVE AND OBJECTIVE BOX
INTERVAL HPI/OVERNIGHT EVENTS:        REVIEW OF SYSTEMS:  CONSTITUTIONAL: feels  well  no  complaints    NECK: No pain or stiffnes  RESPIRATORY: No SOB   CARDIOVASCULAR: No chest pain, palpitations, dizziness,   GASTROINTESTINAL: No abdominal pain. No nausea, vomiting,   NEUROLOGICAL: No headaches, no  blurry  vision no  dizziness  SKIN: No itching,   MUSCULOSKELETAL: No pain    MEDICATION:  acetaminophen     Tablet .. 650 milliGRAM(s) Oral every 6 hours PRN  allopurinol 100 milliGRAM(s) Oral daily  aspirin enteric coated 81 milliGRAM(s) Oral daily  atorvastatin 20 milliGRAM(s) Oral at bedtime  cefTRIAXone   IVPB 1000 milliGRAM(s) IV Intermittent every 24 hours  dextrose 5%. 1000 milliLiter(s) IV Continuous <Continuous>  dextrose 5%. 1000 milliLiter(s) IV Continuous <Continuous>  dextrose 50% Injectable 25 Gram(s) IV Push once  dextrose 50% Injectable 12.5 Gram(s) IV Push once  dextrose 50% Injectable 25 Gram(s) IV Push once  dextrose Oral Gel 15 Gram(s) Oral once PRN  glucagon  Injectable 1 milliGRAM(s) IntraMuscular once  heparin   Injectable 5000 Unit(s) SubCutaneous every 12 hours  insulin lispro (ADMELOG) corrective regimen sliding scale   SubCutaneous three times a day before meals  lactobacillus acidophilus 1 Tablet(s) Oral every 12 hours  metoprolol tartrate 25 milliGRAM(s) Oral two times a day  predniSONE   Tablet 5 milliGRAM(s) Oral daily    Vital Signs Last 24 Hrs  T(C): 36.3 (07 Jun 2023 05:05), Max: 36.9 (06 Jun 2023 17:06)  T(F): 97.3 (07 Jun 2023 05:05), Max: 98.4 (06 Jun 2023 17:06)  HR: 77 (07 Jun 2023 05:05) (77 - 95)  BP: 132/74 (07 Jun 2023 05:05) (120/73 - 141/70)  BP(mean): --  RR: 18 (07 Jun 2023 05:05) (18 - 18)  SpO2: 98% (07 Jun 2023 05:05) (97% - 98%)    Parameters below as of 07 Jun 2023 05:05  Patient On (Oxygen Delivery Method): room air        PHYSICAL EXAM:  GENERAL: NAD, well-groomed, well-developed  HEENT : Conjuntivae  clear sclerae anicteric  NECK: Supple, No JVD, Normal thyroid  NERVOUS SYSTEM:  Alert oriented   no  focal  deficits;   CHEST/LUNG: Clear    HEART: Regular rate and rhythm; No murmurs, rubs, or gallops  ABDOMEN: Soft, Nontender, Nondistended; Bowel sounds present  EXTREMITIES:  no  edema no  tenderness  SKIN: No rashes   LABS:                        12.3   7.44  )-----------( 238      ( 07 Jun 2023 06:30 )             39.7     06-07    137  |  106  |  16  ----------------------------<  109<H>  4.1   |  29  |  0.87    Ca    10.3<H>      07 Jun 2023 06:30    TPro  6.6  /  Alb  2.4<L>  /  TBili  0.3  /  DBili  x   /  AST  24  /  ALT  26  /  AlkPhos  106  06-06        CAPILLARY BLOOD GLUCOSE      POCT Blood Glucose.: 105 mg/dL (07 Jun 2023 07:37)  POCT Blood Glucose.: 121 mg/dL (06 Jun 2023 15:54)  POCT Blood Glucose.: 150 mg/dL (06 Jun 2023 12:36)      RADIOLOGY & ADDITIONAL TESTS:    Imaging reports  Personally Reviewed:  [ ] YES  [ ] NO    Consultant(s) Notes Reviewed:  [x ] YES  [ ] NO    Care Discussed with Consultants/Other Providers [ x] YES  [ ] NO  Problem/Plan - 1:  ·  Problem: Sepsis secondary to UTI.   ·  Plan: rocephin  ivf    for  CXR  today     Problem/Plan - 2:  ·  Problem: Prediabetes.   ·  Plan: diet insulin coverage.    Problem/Plan - 3:  ·  Problem: Stented coronary artery.   ·  Plan: asa lipitor  metoprol.    Problem/Plan - 4:  ·  Problem: HTN (hypertension).   ·  Plan: metoprolol.    Problem/Plan - 5:  ·  Problem: Uric acid renal calculus.   ·  Plan: allopurinol.    Problem/Plan - 6:  ·  Problem: Pulmonary fibrosis.   ·  Plan: prednisone.

## 2023-06-07 NOTE — PROGRESS NOTE ADULT - SUBJECTIVE AND OBJECTIVE BOX
TMAX - 98.6    On day #  4 Rocephin    Vital Signs Last 24 Hrs  T(C): 36.9 (07 Jun 2023 11:19), Max: 36.9 (06 Jun 2023 17:06)  T(F): 98.4 (07 Jun 2023 11:19), Max: 98.4 (06 Jun 2023 17:06)  HR: 82 (07 Jun 2023 11:19) (77 - 95)  BP: 139/73 (07 Jun 2023 11:19) (120/73 - 139/72)  RR: 17 (07 Jun 2023 11:19) (17 - 18)  SpO2: 97% (07 Jun 2023 11:19) (97% - 98%)  Parameters below as of 07 Jun 2023 11:19  Patient On (Oxygen Delivery Method): room air  Supplemental O2:  on RA    Awake, alert, feeling better with no c/o SOB and no cp or abdominal pain and no c/o dysuria or difficulty voiding.  Appetite is improved.  O2 Sat's remain [id number] -98% on RA.    PHYSICAL EXAM  General:  82 y/o   female awake, alert, sitting upright in bed now,  on RA, pleasant and cooperative, in NAD  HEENT: conj pink, sclerae anicteric, PERRLA, no oral lesions noted and mucosa is now moist  Neck: supple, no nodes noted  Heart: RR  Lungs: few rales bilaterally a the bases to about 1/3 of both lung fields  Abdomen: soft, BS+, nontender to palpation, no masses or HS-megaly detected  Back: no CVA or Spinal tenderness noted  Extremities: no edema LE's                    no calf tenderness noted  Skin: warm, dry, no rash noted      I&O's Summary :    06 Jun 2023 07:01  -  07 Jun 2023 07:00  --------------------------------------------------------  IN: 540 mL / OUT: 0 mL / NET: 540 mL      LABS:  CBC Full  -  ( 07 Jun 2023 06:30 )  WBC Count : 7.44 K/uL  RBC Count : 4.48 M/uL  Hemoglobin : 12.3 g/dL  Hematocrit : 39.7 %  Platelet Count - Automated : 238 K/uL  Mean Cell Volume : 88.6 fl  Mean Cell Hemoglobin : 27.5 pg  Mean Cell Hemoglobin Concentration : 31.0 g/dL  Auto Neutrophil # : 4.21 K/uL  Auto Lymphocyte # : 1.95 K/uL  Auto Monocyte # : 0.74 K/uL  Auto Eosinophil # : 0.46 K/uL  Auto Basophil # : 0.04 K/uL  Auto Neutrophil % : 56.7 %  Auto Lymphocyte % : 26.2 %  Auto Monocyte % : 9.9 %  Auto Eosinophil % : 6.2 %  Auto Basophil % : 0.5 %    06-07    137  |  106  |  16  ----------------------------<  109<H>  4.1   |  29  |  0.87    Ca    10.3<H>      07 Jun 2023 06:30    TPro  6.6  /  Alb  2.4<L>  /  TBili  0.3  /  DBili  x   /  AST  24  /  ALT  26  /  AlkPhos  106  06-06    LIVER FUNCTIONS - ( 06 Jun 2023 08:22 )  Alb: 2.4 g/dL / Pro: 6.6 gm/dL / ALK PHOS: 106 U/L / ALT: 26 U/L / AST: 24 U/L / GGT: x           Sedimentation Rate, Erythrocyte: 54 mm/hr (06-07 @ 06:30)  Sedimentation Rate, Erythrocyte: 65 mm/hr (06-06 @ 08:22)    C-Reactive Protein, Serum (06.07.23 @ 06:30)    C-Reactive Protein, Serum: 46 mg/L    C-Reactive Protein, Serum (06.06.23 @ 08:22)    C-Reactive Protein, Serum: 79 mg/L    Procalcitonin, Serum (06.07.23 @ 06:30)    Procalcitonin, Serum: 0.14:     Procalcitonin, Serum (06.06.23 @ 08:22)    Procalcitonin, Serum: 0.18:       A1C with Estimated Average Glucose (06.05.23 @ 06:10)    A1C with Estimated Average Glucose Result: 6.0:   Estimated Average Glucose: 126:     Lactate, Blood (06.04.23 @ 14:47)    Lactate, Blood: 1.4 mmol/L    MICROBIOLOGY:  Specimen Source: .Blood Blood-Peripheral (06-04 @ 14:57)  Culture Results:   No growth to date. (06-04 @ 14:57)    Specimen Source: .Blood Blood-Peripheral (06-04 @ 14:47)  Culture Results:   No growth to date. (06-04 @ 14:47)    Specimen Source: Clean Catch Clean Catch (Midstream) (06-04 @ 14:47)  Culture Results:   >100,000 CFU/ml Escherichia coli (06-04 @ 14:47)    Respiratory Viral Panel with COVID-19 by JOSE ANTONIO (06.04.23 @ 14:47)    Rapid RVP Result: [facility]   SARS-CoV-2: [facility]: This Respiratory Panel uses polymerase chain reaction (PCR) to detect for  adenovirus; coronavirus (HKU1, NL63, 229E, OC43); human metapneumovirus  (hMPV); human enterovirus/rhinovirus (Entero/RV); influenza A; influenza  A/H1; influenza A/H3; influenza A/H1-2009; influenza B; parainfluenza  viruses 1, 2, 3, 4; respiratory syncytial virus; Mycopla      RADIOLOGY:  < from: Xray Chest 1 View- PORTABLE-Routine (Xray Chest 1 View- PORTABLE-Routine in AM.) (06.07.23 @ 09:47) >  ACC: [id number] EXAM:  XR CHEST PORTABLE ROUTINE 1V   ORDERED BY: JONATHAN GALO   PROCEDURE DATE:  06/07/2023    INTERPRETATION:  AP semierect chest on June 7, 2023 at 9:15 AM. Patient   has sepsis.  Heart magnified by technique.  Lungs remain clear.  Chest is similar to June 4 this year.    < from: US Renal (06.05.23 @ 13:58) >  ACC: [id number] EXAM:  US KIDNEY(S)   ORDERED BY: JIE AMIN   PROCEDURE DATE:  06/05/2023    INTERPRETATION:  CLINICAL INFORMATION: Urinary tract infection  COMPARISON: 9/20/2019  TECHNIQUE: Sonography of the kidneys and bladder.  FINDINGS:  Right kidney: 8.2 cm. Cortical thinning. No renal mass, hydronephrosis or   calculi.  Left kidney: 9.0 cm. No renal mass, hydronephrosis or calculi.  Urinary bladder: Within normal limits.  IMPRESSION:  No hydronephrosis.     < from: Xray Chest 1 View- PORTABLE-Urgent (06.04.23 @ 15:48) >  ACC: [id number] EXAM:  XR CHEST PORTABLE URGENT 1V   ORDERED BY: CHEIKH LENZ   PROCEDURE DATE:  06/04/2023    INTERPRETATION:  AP chest on June 4, 2023 at 3:08 PM. Patient has sepsis.  Heart magnified by technique.  There is slight blunting of left base laterally increased from September 30, 2019.  IMPRESSION: Slight blunting left base laterally at this time.      Impression:  82 y/o female born in [place] the [place]rs[place] with hx of HTN,  CAD, s/p Coronary Stent placement, Pulmonary Fibrosis, maintained on 5 mg of Prednisone/day, Uric Acid Renal Calculi, Pre-Diabetic, admitted to [facility] via the ER on 6/4/23 with c/o weakness and decreased po intake  x 2 days at home.  In the ER patient was found to be febrile to 101.7 and w/u revealed WBC's were elevated to 12.620 and u/a with micro revealed 11-25 WBC's and LE and Nitrites were positive.  Further w/u with CXR revealed some increased blunting of the Lt. base from a prior CXR in 2019 and a Renal Sono was done and showed no hydronephrosis or renal calculii.  2 Blood Cx's and a Urine Cx were obtained and she was begun empirically on Rocephin.  Respiratory Viral Panel was also done and resulted as Negative. Blood Cx's x 2 from 6/4/23 are negative thus far and now Urine Cx is growing >100,000 EColi with sensitivities pending.  Noted WBC's are WNL on 6/6/23 but ESR, CRP, and Procalcitonin are elevated.  Repeat CXR done this AM ( 6/7/23 ) is now reported with no acute changes.    Suggestions:   Will therefore continue current ab rx with Rocephin for rx of Sepsis due to EColi UTI  pending further Blood and Urine Cx results.  Follow-up temps and labs closely. Continue Bacid while on ab rx. Discussed in detail with patient and her daughter at the bedside.

## 2023-06-08 LAB
ALBUMIN SERPL ELPH-MCNC: 2.7 G/DL — LOW (ref 3.3–5)
ALP SERPL-CCNC: 114 U/L — SIGNIFICANT CHANGE UP (ref 40–120)
ALT FLD-CCNC: 41 U/L — SIGNIFICANT CHANGE UP (ref 12–78)
ANION GAP SERPL CALC-SCNC: 2 MMOL/L — LOW (ref 5–17)
AST SERPL-CCNC: 37 U/L — SIGNIFICANT CHANGE UP (ref 15–37)
BILIRUB SERPL-MCNC: 0.3 MG/DL — SIGNIFICANT CHANGE UP (ref 0.2–1.2)
BUN SERPL-MCNC: 18 MG/DL — SIGNIFICANT CHANGE UP (ref 7–23)
CALCIUM SERPL-MCNC: 10.9 MG/DL — HIGH (ref 8.5–10.1)
CALCIUM SERPL-MCNC: 11.1 MG/DL — HIGH (ref 8.4–10.5)
CHLORIDE SERPL-SCNC: 106 MMOL/L — SIGNIFICANT CHANGE UP (ref 96–108)
CO2 SERPL-SCNC: 30 MMOL/L — SIGNIFICANT CHANGE UP (ref 22–31)
CREAT SERPL-MCNC: 0.94 MG/DL — SIGNIFICANT CHANGE UP (ref 0.5–1.3)
EGFR: 60 ML/MIN/1.73M2 — SIGNIFICANT CHANGE UP
GLUCOSE SERPL-MCNC: 106 MG/DL — HIGH (ref 70–99)
HCT VFR BLD CALC: 42.9 % — SIGNIFICANT CHANGE UP (ref 34.5–45)
HGB BLD-MCNC: 13.6 G/DL — SIGNIFICANT CHANGE UP (ref 11.5–15.5)
MCHC RBC-ENTMCNC: 27.4 PG — SIGNIFICANT CHANGE UP (ref 27–34)
MCHC RBC-ENTMCNC: 31.7 G/DL — LOW (ref 32–36)
MCV RBC AUTO: 86.3 FL — SIGNIFICANT CHANGE UP (ref 80–100)
NRBC # BLD: 0 /100 WBCS — SIGNIFICANT CHANGE UP (ref 0–0)
PLATELET # BLD AUTO: 308 K/UL — SIGNIFICANT CHANGE UP (ref 150–400)
POTASSIUM SERPL-MCNC: 3.7 MMOL/L — SIGNIFICANT CHANGE UP (ref 3.5–5.3)
POTASSIUM SERPL-SCNC: 3.7 MMOL/L — SIGNIFICANT CHANGE UP (ref 3.5–5.3)
PROT SERPL-MCNC: 7.2 GM/DL — SIGNIFICANT CHANGE UP (ref 6–8.3)
PTH-INTACT FLD-MCNC: 78 PG/ML — HIGH (ref 15–65)
RBC # BLD: 4.97 M/UL — SIGNIFICANT CHANGE UP (ref 3.8–5.2)
RBC # FLD: 14.8 % — HIGH (ref 10.3–14.5)
SODIUM SERPL-SCNC: 138 MMOL/L — SIGNIFICANT CHANGE UP (ref 135–145)
WBC # BLD: 8.24 K/UL — SIGNIFICANT CHANGE UP (ref 3.8–10.5)
WBC # FLD AUTO: 8.24 K/UL — SIGNIFICANT CHANGE UP (ref 3.8–10.5)

## 2023-06-08 RX ADMIN — Medication 5 MILLIGRAM(S): at 05:27

## 2023-06-08 RX ADMIN — Medication 100 MILLIGRAM(S): at 14:42

## 2023-06-08 RX ADMIN — Medication 1: at 11:10

## 2023-06-08 RX ADMIN — HEPARIN SODIUM 5000 UNIT(S): 5000 INJECTION INTRAVENOUS; SUBCUTANEOUS at 18:47

## 2023-06-08 RX ADMIN — Medication 81 MILLIGRAM(S): at 14:41

## 2023-06-08 RX ADMIN — HEPARIN SODIUM 5000 UNIT(S): 5000 INJECTION INTRAVENOUS; SUBCUTANEOUS at 05:28

## 2023-06-08 RX ADMIN — ATORVASTATIN CALCIUM 20 MILLIGRAM(S): 80 TABLET, FILM COATED ORAL at 22:02

## 2023-06-08 RX ADMIN — Medication 25 MILLIGRAM(S): at 05:27

## 2023-06-08 RX ADMIN — Medication 1 TABLET(S): at 05:27

## 2023-06-08 RX ADMIN — CEFTRIAXONE 100 MILLIGRAM(S): 500 INJECTION, POWDER, FOR SOLUTION INTRAMUSCULAR; INTRAVENOUS at 17:51

## 2023-06-08 RX ADMIN — Medication 1 TABLET(S): at 18:46

## 2023-06-08 RX ADMIN — Medication 25 MILLIGRAM(S): at 18:46

## 2023-06-08 NOTE — PROGRESS NOTE ADULT - SUBJECTIVE AND OBJECTIVE BOX
INTERVAL HPI/OVERNIGHT EVENTS:        REVIEW OF SYSTEMS:  CONSTITUTIONAL: feels  well  no  complaints    NECK: No pain or stiffnes  RESPIRATORY: No SOB   CARDIOVASCULAR: No chest pain, palpitations, dizziness,   GASTROINTESTINAL: No abdominal pain. No nausea, vomiting,   NEUROLOGICAL: No headaches, no  blurry  vision no  dizziness  SKIN: No itching,   MUSCULOSKELETAL: No pain    MEDICATION:  acetaminophen     Tablet .. 650 milliGRAM(s) Oral every 6 hours PRN  allopurinol 100 milliGRAM(s) Oral daily  aspirin enteric coated 81 milliGRAM(s) Oral daily  atorvastatin 20 milliGRAM(s) Oral at bedtime  cefTRIAXone   IVPB 1000 milliGRAM(s) IV Intermittent every 24 hours  dextrose 5%. 1000 milliLiter(s) IV Continuous <Continuous>  dextrose 5%. 1000 milliLiter(s) IV Continuous <Continuous>  dextrose 50% Injectable 25 Gram(s) IV Push once  dextrose 50% Injectable 25 Gram(s) IV Push once  dextrose 50% Injectable 12.5 Gram(s) IV Push once  dextrose Oral Gel 15 Gram(s) Oral once PRN  glucagon  Injectable 1 milliGRAM(s) IntraMuscular once  heparin   Injectable 5000 Unit(s) SubCutaneous every 12 hours  insulin lispro (ADMELOG) corrective regimen sliding scale   SubCutaneous three times a day before meals  lactobacillus acidophilus 1 Tablet(s) Oral every 12 hours  metoprolol tartrate 25 milliGRAM(s) Oral two times a day  predniSONE   Tablet 5 milliGRAM(s) Oral daily    Vital Signs Last 24 Hrs  T(C): 36.3 (08 Jun 2023 05:12), Max: 36.9 (07 Jun 2023 11:19)  T(F): 97.3 (08 Jun 2023 05:12), Max: 98.5 (07 Jun 2023 23:45)  HR: 86 (08 Jun 2023 05:12) (82 - 96)  BP: 124/70 (08 Jun 2023 05:12) (123/64 - 139/73)  BP(mean): --  RR: 17 (08 Jun 2023 05:12) (17 - 18)  SpO2: 97% (08 Jun 2023 05:12) (95% - 97%)    Parameters below as of 07 Jun 2023 17:14  Patient On (Oxygen Delivery Method): room air        PHYSICAL EXAM:  GENERAL: NAD, well-groomed, well-developed  HEENT : Conjuntivae  clear sclerae anicteric  NECK: Supple, No JVD, Normal thyroid  NERVOUS SYSTEM:  Alert oriented   no  focal  deficits;   CHEST/LUNG: Clear    HEART: Regular rate and rhythm; No murmurs, rubs, or gallops  ABDOMEN: Soft, Nontender, Nondistended; Bowel sounds present  EXTREMITIES:  no  edema no  tenderness  SKIN: No rashes   LABS:                        13.6   8.24  )-----------( 308      ( 08 Jun 2023 06:05 )             42.9     06-08    138  |  106  |  18  ----------------------------<  106<H>  3.7   |  30  |  0.94    Ca    10.9<H>      08 Jun 2023 06:05    TPro  7.2  /  Alb  2.7<L>  /  TBili  0.3  /  DBili  x   /  AST  37  /  ALT  41  /  AlkPhos  114  06-08        CAPILLARY BLOOD GLUCOSE      POCT Blood Glucose.: 134 mg/dL (08 Jun 2023 07:46)  POCT Blood Glucose.: 83 mg/dL (07 Jun 2023 21:01)  POCT Blood Glucose.: 98 mg/dL (07 Jun 2023 16:08)  POCT Blood Glucose.: 173 mg/dL (07 Jun 2023 10:57)      RADIOLOGY & ADDITIONAL TESTS:    Imaging reports  Personally Reviewed:  [ ] YES  [ ] NO    Consultant(s) Notes Reviewed:  [x ] YES  [ ] NO    Care Discussed with Consultants/Other Providers [x ] YES  [ ] NO  Problem/Plan - 1:  ·  Problem: Sepsis secondary to  E coli UTI.   ·  Plan: rocephin  ivf    will discuss  with  ID  change  to PO  AB a nd  continue  Rx  as out patient    Problem/Plan - 2:  ·  Problem: Prediabetes.   ·  Plan: diet insulin coverage.    Problem/Plan - 3:  ·  Problem: Stented coronary artery.   ·  Plan: asa lipitor  metoprol.    Problem/Plan - 4:  ·  Problem: HTN (hypertension).   ·  Plan: metoprolol.    Problem/Plan - 5:  ·  Problem: Uric acid renal calculus.   ·  Plan: allopurinol.    Problem/Plan - 6:  ·  Problem: Pulmonary fibrosis.   ·  Plan: prednisone.

## 2023-06-09 ENCOUNTER — TRANSCRIPTION ENCOUNTER (OUTPATIENT)
Age: 83
End: 2023-06-09

## 2023-06-09 VITALS
OXYGEN SATURATION: 97 % | HEART RATE: 80 BPM | RESPIRATION RATE: 18 BRPM | TEMPERATURE: 98 F | DIASTOLIC BLOOD PRESSURE: 80 MMHG | SYSTOLIC BLOOD PRESSURE: 121 MMHG

## 2023-06-09 LAB
ALBUMIN SERPL ELPH-MCNC: 2.6 G/DL — LOW (ref 3.3–5)
ALP SERPL-CCNC: 105 U/L — SIGNIFICANT CHANGE UP (ref 40–120)
ALT FLD-CCNC: 51 U/L — SIGNIFICANT CHANGE UP (ref 12–78)
ANION GAP SERPL CALC-SCNC: 2 MMOL/L — LOW (ref 5–17)
AST SERPL-CCNC: 47 U/L — HIGH (ref 15–37)
BILIRUB SERPL-MCNC: 0.3 MG/DL — SIGNIFICANT CHANGE UP (ref 0.2–1.2)
BUN SERPL-MCNC: 15 MG/DL — SIGNIFICANT CHANGE UP (ref 7–23)
CALCIUM SERPL-MCNC: 10.6 MG/DL — HIGH (ref 8.5–10.1)
CHLORIDE SERPL-SCNC: 106 MMOL/L — SIGNIFICANT CHANGE UP (ref 96–108)
CO2 SERPL-SCNC: 29 MMOL/L — SIGNIFICANT CHANGE UP (ref 22–31)
CREAT SERPL-MCNC: 0.84 MG/DL — SIGNIFICANT CHANGE UP (ref 0.5–1.3)
CULTURE RESULTS: SIGNIFICANT CHANGE UP
CULTURE RESULTS: SIGNIFICANT CHANGE UP
EGFR: 69 ML/MIN/1.73M2 — SIGNIFICANT CHANGE UP
GLUCOSE SERPL-MCNC: 113 MG/DL — HIGH (ref 70–99)
HCT VFR BLD CALC: 38.4 % — SIGNIFICANT CHANGE UP (ref 34.5–45)
HGB BLD-MCNC: 12.3 G/DL — SIGNIFICANT CHANGE UP (ref 11.5–15.5)
MCHC RBC-ENTMCNC: 27.9 PG — SIGNIFICANT CHANGE UP (ref 27–34)
MCHC RBC-ENTMCNC: 32 G/DL — SIGNIFICANT CHANGE UP (ref 32–36)
MCV RBC AUTO: 87.1 FL — SIGNIFICANT CHANGE UP (ref 80–100)
NRBC # BLD: 0 /100 WBCS — SIGNIFICANT CHANGE UP (ref 0–0)
PLATELET # BLD AUTO: 281 K/UL — SIGNIFICANT CHANGE UP (ref 150–400)
POTASSIUM SERPL-MCNC: 4.1 MMOL/L — SIGNIFICANT CHANGE UP (ref 3.5–5.3)
POTASSIUM SERPL-SCNC: 4.1 MMOL/L — SIGNIFICANT CHANGE UP (ref 3.5–5.3)
PROT SERPL-MCNC: 6.4 GM/DL — SIGNIFICANT CHANGE UP (ref 6–8.3)
RBC # BLD: 4.41 M/UL — SIGNIFICANT CHANGE UP (ref 3.8–5.2)
RBC # FLD: 14.7 % — HIGH (ref 10.3–14.5)
SODIUM SERPL-SCNC: 137 MMOL/L — SIGNIFICANT CHANGE UP (ref 135–145)
SPECIMEN SOURCE: SIGNIFICANT CHANGE UP
SPECIMEN SOURCE: SIGNIFICANT CHANGE UP
WBC # BLD: 8.32 K/UL — SIGNIFICANT CHANGE UP (ref 3.8–10.5)
WBC # FLD AUTO: 8.32 K/UL — SIGNIFICANT CHANGE UP (ref 3.8–10.5)

## 2023-06-09 RX ORDER — ASPIRIN/CALCIUM CARB/MAGNESIUM 324 MG
1 TABLET ORAL
Qty: 0 | Refills: 0 | DISCHARGE
Start: 2023-06-09

## 2023-06-09 RX ORDER — ACETAMINOPHEN 500 MG
2 TABLET ORAL
Qty: 0 | Refills: 0 | DISCHARGE
Start: 2023-06-09

## 2023-06-09 RX ORDER — ALLOPURINOL 300 MG
1 TABLET ORAL
Qty: 0 | Refills: 0 | DISCHARGE
Start: 2023-06-09

## 2023-06-09 RX ORDER — METOPROLOL TARTRATE 50 MG
1 TABLET ORAL
Qty: 0 | Refills: 0 | DISCHARGE
Start: 2023-06-09

## 2023-06-09 RX ORDER — LACTOBACILLUS ACIDOPHILUS 100MM CELL
1 CAPSULE ORAL
Qty: 0 | Refills: 0 | DISCHARGE
Start: 2023-06-09

## 2023-06-09 RX ORDER — ATORVASTATIN CALCIUM 80 MG/1
1 TABLET, FILM COATED ORAL
Qty: 0 | Refills: 0 | DISCHARGE
Start: 2023-06-09

## 2023-06-09 RX ADMIN — Medication 5 MILLIGRAM(S): at 05:52

## 2023-06-09 RX ADMIN — Medication 81 MILLIGRAM(S): at 13:00

## 2023-06-09 RX ADMIN — Medication 1 TABLET(S): at 05:52

## 2023-06-09 RX ADMIN — Medication 25 MILLIGRAM(S): at 05:52

## 2023-06-09 RX ADMIN — Medication 100 MILLIGRAM(S): at 12:59

## 2023-06-09 RX ADMIN — HEPARIN SODIUM 5000 UNIT(S): 5000 INJECTION INTRAVENOUS; SUBCUTANEOUS at 05:52

## 2023-06-09 NOTE — DISCHARGE NOTE PROVIDER - HOSPITAL COURSE
83-year-old female past medical history of hypertension, hyperlipidemia, pulmonary fibrosis, CAD status post 2 stents, on baby aspirin only presents emergency room for weakness.  Per daughter, patient has not been feeling good since yesterday. Of note, code stroke evaluation called in triage given brief episode of confusion and reported right leg weakness.  Patient meets code sepsis criteria, code stroke was not called at this time setting of stroke scale was 0. In the ER patient was found to be febrile to 101.7 and w/u revealed WBC's were elevated to 12,620 and u/a with micro revealed 11-25 WBC's and LE and Nitrites were positive.  Further w/u with CXR revealed some increased blunting of the Lt. base from a prior CXR in 2019 and a Renal Sono was done and showed no hydronephrosis or renal calculi.  2 Blood Cx's and a Urine Cx were obtained and she was begun empirically on Rocephin.  Respiratory Viral Panel was also done and resulted as Negative. Patient c/o poor appetite still but no c/o chills now and no c/o SOB, cp, or abdominal pain.  Mild intermittent dry cough reported but unable to expectorate any mucous.  No c/o HA, and no N/V/D reported.    Problem/Plan - 1:  ·  Problem: Sepsis secondary to UTI. -> (6/7): Ecoli in urine. c/w abx. ID on board. Will change to PO Bactrim x3 days at discharge  ·  Plan: Rocephin  IVF renal bladder  sonogram.    Problem/Plan - 2:  ·  Problem: Prediabetes.   ·  Plan: diet insulin coverage.    Problem/Plan - 3:  ·  Problem: Stented coronary artery.   ·  Plan: asa Lipitor  Metoprolol.    Problem/Plan - 4:  ·  Problem: HTN (hypertension).   ·  Plan: metoprolol.    Problem/Plan - 5:  ·  Problem: Uric acid renal calculus.   ·  Plan: allopurinol.    Problem/Plan - 6:  ·  Problem: Pulmonary fibrosis.   ·  Plan: prednisone.    ID consult: continue current ab rx with Rocephin for Sepsis likely due to  source with ? underlying PNA? pending Blood and Urine Cx results.  Follow-up temps and labs closely. Monitor O2 Sat's and follow-up CXR.  Add Bacid while on ab rx. Discussed in detail with patient and her daughter at the bedside.     PT Eval: home, no PT skilled needs    Vital Signs Last 24 Hrs  T(C): 36.7 (09 Jun 2023 10:28), Max: 37.1 (08 Jun 2023 23:45)  T(F): 98 (09 Jun 2023 10:28), Max: 98.8 (08 Jun 2023 23:45)  HR: 80 (09 Jun 2023 10:28) (80 - 96)  BP: 121/80 (09 Jun 2023 10:28) (121/80 - 139/79)  RR: 18 (09 Jun 2023 10:28) (18 - 18)  SpO2: 97% (09 Jun 2023 10:28) (97% - 98%)    Parameters below as of 08 Jun 2023 15:26  Patient On (Oxygen Delivery Method): room air               12.3   8.32  )-----------( 281      ( 06-09 @ 06:19 )             38.4                13.6   8.24  )-----------( 308      ( 06-08 @ 06:05 )             42.9                12.3   7.44  )-----------( 238      ( 06-07 @ 06:30 )             39.7

## 2023-06-09 NOTE — DISCHARGE NOTE NURSING/CASE MANAGEMENT/SOCIAL WORK - NSDCPEFALRISK_GEN_ALL_CORE
For information on Fall & Injury Prevention, visit: [email or web address] OR  [email or web address] OR  https://www.cdc.gov/steadi/patient.html

## 2023-06-09 NOTE — DISCHARGE NOTE PROVIDER - NSDCCPCAREPLAN_GEN_ALL_CORE_FT
PRINCIPAL DISCHARGE DIAGNOSIS  Diagnosis: Sepsis  Assessment and Plan of Treatment: resolved      SECONDARY DISCHARGE DIAGNOSES  Diagnosis: Acute UTI  Assessment and Plan of Treatment: resolved

## 2023-06-09 NOTE — PROGRESS NOTE ADULT - SUBJECTIVE AND OBJECTIVE BOX
INTERVAL HPI/OVERNIGHT EVENTS:        REVIEW OF SYSTEMS:  CONSTITUTIONAL:  no  complaints    NECK: No pain or stiffnes  RESPIRATORY: No SOB   CARDIOVASCULAR: No chest pain, palpitations, dizziness,   GASTROINTESTINAL: No abdominal pain. No nausea, vomiting,   NEUROLOGICAL: No headaches, no  blurry  vision no  dizziness  SKIN: No itching,   MUSCULOSKELETAL: No pain    MEDICATION:  acetaminophen     Tablet .. 650 milliGRAM(s) Oral every 6 hours PRN  allopurinol 100 milliGRAM(s) Oral daily  aspirin enteric coated 81 milliGRAM(s) Oral daily  atorvastatin 20 milliGRAM(s) Oral at bedtime  cefTRIAXone   IVPB 1000 milliGRAM(s) IV Intermittent every 24 hours  dextrose 5%. 1000 milliLiter(s) IV Continuous <Continuous>  dextrose 5%. 1000 milliLiter(s) IV Continuous <Continuous>  dextrose 50% Injectable 25 Gram(s) IV Push once  dextrose 50% Injectable 12.5 Gram(s) IV Push once  dextrose 50% Injectable 25 Gram(s) IV Push once  dextrose Oral Gel 15 Gram(s) Oral once PRN  glucagon  Injectable 1 milliGRAM(s) IntraMuscular once  heparin   Injectable 5000 Unit(s) SubCutaneous every 12 hours  insulin lispro (ADMELOG) corrective regimen sliding scale   SubCutaneous three times a day before meals  lactobacillus acidophilus 1 Tablet(s) Oral every 12 hours  metoprolol tartrate 25 milliGRAM(s) Oral two times a day  predniSONE   Tablet 5 milliGRAM(s) Oral daily    Vital Signs Last 24 Hrs  T(C): 36.5 (09 Jun 2023 05:14), Max: 37.1 (08 Jun 2023 23:45)  T(F): 97.7 (09 Jun 2023 05:14), Max: 98.8 (08 Jun 2023 23:45)  HR: 94 (09 Jun 2023 05:14) (75 - 96)  BP: 134/74 (09 Jun 2023 05:14) (114/78 - 139/79)  BP(mean): --  RR: 18 (09 Jun 2023 05:14) (18 - 18)  SpO2: 97% (09 Jun 2023 05:14) (97% - 98%)    Parameters below as of 08 Jun 2023 15:26  Patient On (Oxygen Delivery Method): room air        PHYSICAL EXAM:  GENERAL: NAD, well-groomed, well-developed  HEENT : Conjuntivae  clear sclerae anicteric  NECK: Supple, No JVD, Normal thyroid  NERVOUS SYSTEM:  Alert oriented   no  focal  deficits;   CHEST/LUNG: Clear    HEART: Regular rate and rhythm; No murmurs, rubs, or gallops  ABDOMEN: Soft, Nontender, Nondistended; Bowel sounds present  EXTREMITIES:  no  edema no  tenderness  SKIN: No rashes   LABS:                        12.3   8.32  )-----------( 281      ( 09 Jun 2023 06:19 )             38.4     06-09    137  |  106  |  15  ----------------------------<  113<H>  4.1   |  29  |  0.84    Ca    10.6<H>      09 Jun 2023 06:19    TPro  6.4  /  Alb  2.6<L>  /  TBili  0.3  /  DBili  x   /  AST  47<H>  /  ALT  51  /  AlkPhos  105  06-09        CAPILLARY BLOOD GLUCOSE      POCT Blood Glucose.: 116 mg/dL (09 Jun 2023 07:48)  POCT Blood Glucose.: 153 mg/dL (08 Jun 2023 21:07)  POCT Blood Glucose.: 138 mg/dL (08 Jun 2023 17:47)  POCT Blood Glucose.: 168 mg/dL (08 Jun 2023 11:06)      RADIOLOGY & ADDITIONAL TESTS:    Imaging reports  Personally Reviewed:  [ ] YES  [ ] NO    Consultant(s) Notes Reviewed:  [ ] YES  [ ] NO    Care Discussed with Consultants/Other Providers [ x] YES  [ ] NO  Problem/Plan - 1:  ·  Problem: Sepsis secondary to  E coli UTI.   ·  Plan: rocephin  ivf    discharge  hoe  on  oral  bactrim  for 3 more  days    Problem/Plan - 2:  ·  Problem: Prediabetes.   ·  Plan: diet insulin coverage.    Problem/Plan - 3:  ·  Problem: Stented coronary artery.   ·  Plan: asa lipitor  metoprol.    Problem/Plan - 4:  ·  Problem: HTN (hypertension).   ·  Plan: metoprolol.    Problem/Plan - 5:  ·  Problem: Uric acid renal calculus.   ·  Plan: allopurinol.    Problem/Plan - 6:  ·  Problem: Pulmonary fibrosis.   ·  Plan: prednisone.  hyperparathyroidisms  continue  to monitor  as  out pt

## 2023-06-09 NOTE — PROGRESS NOTE ADULT - SUBJECTIVE AND OBJECTIVE BOX
INTERVAL HPI/OVERNIGHT EVENTS:        REVIEW OF SYSTEMS:  CONSTITUTIONAL:  no  complaints    NECK: No pain or stiffnes  RESPIRATORY: No SOB   CARDIOVASCULAR: No chest pain, palpitations, dizziness,   GASTROINTESTINAL: No abdominal pain. No nausea, vomiting,   NEUROLOGICAL: No headaches, no  blurry  vision no  dizziness  SKIN: No itching,   MUSCULOSKELETAL: No pain    MEDICATION:  acetaminophen     Tablet .. 650 milliGRAM(s) Oral every 6 hours PRN  allopurinol 100 milliGRAM(s) Oral daily  aspirin enteric coated 81 milliGRAM(s) Oral daily  atorvastatin 20 milliGRAM(s) Oral at bedtime  dextrose 5%. 1000 milliLiter(s) IV Continuous <Continuous>  dextrose 5%. 1000 milliLiter(s) IV Continuous <Continuous>  dextrose 50% Injectable 25 Gram(s) IV Push once  dextrose 50% Injectable 12.5 Gram(s) IV Push once  dextrose 50% Injectable 25 Gram(s) IV Push once  dextrose Oral Gel 15 Gram(s) Oral once PRN  glucagon  Injectable 1 milliGRAM(s) IntraMuscular once  heparin   Injectable 5000 Unit(s) SubCutaneous every 12 hours  insulin lispro (ADMELOG) corrective regimen sliding scale   SubCutaneous three times a day before meals  lactobacillus acidophilus 1 Tablet(s) Oral every 12 hours  metoprolol tartrate 25 milliGRAM(s) Oral two times a day  predniSONE   Tablet 5 milliGRAM(s) Oral daily    Vital Signs Last 24 Hrs  T(C): 36.5 (09 Jun 2023 05:14), Max: 37.1 (08 Jun 2023 23:45)  T(F): 97.7 (09 Jun 2023 05:14), Max: 98.8 (08 Jun 2023 23:45)  HR: 94 (09 Jun 2023 05:14) (75 - 96)  BP: 134/74 (09 Jun 2023 05:14) (114/78 - 139/79)  BP(mean): --  RR: 18 (09 Jun 2023 05:14) (18 - 18)  SpO2: 97% (09 Jun 2023 05:14) (97% - 98%)    Parameters below as of 08 Jun 2023 15:26  Patient On (Oxygen Delivery Method): room air        PHYSICAL EXAM:  GENERAL: NAD, well-groomed, well-developed  HEENT : Conjuntivae  clear sclerae anicteric  NECK: Supple, No JVD, Normal thyroid  NERVOUS SYSTEM:  Alert oriented   no  focal  deficits;   CHEST/LUNG: Clear    HEART: Regular rate and rhythm; No murmurs, rubs, or gallops  ABDOMEN: Soft, Nontender, Nondistended; Bowel sounds present  EXTREMITIES:  no  edema no  tenderness  SKIN: No rashes   LABS:                        12.3   8.32  )-----------( 281      ( 09 Jun 2023 06:19 )             38.4     06-09    137  |  106  |  15  ----------------------------<  113<H>  4.1   |  29  |  0.84    Ca    10.6<H>      09 Jun 2023 06:19    TPro  6.4  /  Alb  2.6<L>  /  TBili  0.3  /  DBili  x   /  AST  47<H>  /  ALT  51  /  AlkPhos  105  06-09        CAPILLARY BLOOD GLUCOSE      POCT Blood Glucose.: 116 mg/dL (09 Jun 2023 07:48)  POCT Blood Glucose.: 153 mg/dL (08 Jun 2023 21:07)  POCT Blood Glucose.: 138 mg/dL (08 Jun 2023 17:47)  POCT Blood Glucose.: 168 mg/dL (08 Jun 2023 11:06)      RADIOLOGY & ADDITIONAL TESTS:    Imaging reports  Personally Reviewed:  [ ] YES  [ ] NO    Consultant(s) Notes Reviewed:  [ ] YES  [ ] NO    Care Discussed with Consultants/Other Providers [ x] YES  [ ] NO  timProblem/Plan - 1:  ·  Problem: Sepsis secondary to  E coli UTI.   ·  Plan: discharge  hoe  on  oral  bactrim  for 3 more  days    Problem/Plan - 2:  ·  Problem: Prediabetes.   ·  Plan: diet insulin coverage.    Problem/Plan - 3:  ·  Problem: Stented coronary artery.   ·  Plan: asa lipitor  metoprol.    Problem/Plan - 4:  ·  Problem: HTN (hypertension).   ·  Plan: metoprolol.    Problem/Plan - 5:  ·  Problem: Uric acid renal calculus.   ·  Plan: allopurinol.    Problem/Plan - 6:  ·  Problem: Pulmonary fibrosis.   ·  Plan: prednisone.  hyperparathyroidisms  continue  to monitor  as  out pt

## 2023-06-09 NOTE — DISCHARGE NOTE PROVIDER - CARE PROVIDER_API CALL
Jin Gregorio  Internal Medicine  [address] [place] [address]  Phone: [phone]  Fax: [phone]  Follow Up Time: 1 week

## 2023-06-09 NOTE — DISCHARGE NOTE NURSING/CASE MANAGEMENT/SOCIAL WORK - NSFLUVACAGEDISCH_IMM_ALL_CORE
[FreeTextEntry1] : Symptoms likely due to viral URI. Recommend supportive care including antipyretics, fluids, and nasal saline followed by nasal suction. Return if symptoms worsen or persist.\par - Symptomatic treatment\par - Cool moist air /Humidifier\par - Saline drops\par - Discussed maintaining adequate hydration\par - Handwashing and infection control discussed\par - Close observation advised for worsening symptoms\par - Return to the office if condition worsens or new symptoms arise\par - Go to the emergency room if condition worsens and unable to get to the office or during after hours\par - Next Visit: as needed or if temp > 48 hours \par \par - Discussed with family that current strep testing is NEGATIVE. A regular throat culture will be done, with results obtained in 24-28 hours.  If the throat culture is positive, a prescription will be sent to the patient’s  pharmacy.  If the throat culture is negative after 48 hours and the child is not better, the child should be re-checked.  \par - Discussed with pt /family the etiology, natural course, possible complications, and treatment options for pharyngitis.  Recommended OTC therapy with pain/fever control products, topical products (lozenges/sprays/gargles) as needed per 's recommendation.\par - Medication Instruction: If throat culture positive, give Amoxicillin 400mg/5mL, 6.5mL BID x 10 days 
Adult

## 2023-06-09 NOTE — PROGRESS NOTE ADULT - PROVIDER SPECIALTY LIST ADULT
Internal Medicine
Infectious Disease
Infectious Disease
Internal Medicine

## 2023-06-09 NOTE — DISCHARGE NOTE PROVIDER - NSDCMRMEDTOKEN_GEN_ALL_CORE_FT
acetaminophen 325 mg oral tablet: 2 tab(s) orally every 6 hours As needed Temp greater or equal to 38C (100.4F)  allopurinol 100 mg oral tablet: 1 tab(s) orally once a day  aspirin 81 mg oral delayed release tablet: 1 tab(s) orally once a day  atorvastatin 20 mg oral tablet: 1 tab(s) orally once a day (at bedtime)  lactobacillus acidophilus oral capsule: 1 orally once a day  metoprolol tartrate 25 mg oral tablet: 1 tab(s) orally 2 times a day  predniSONE 5 mg oral tablet: 1 tab(s) orally once a day  sulfamethoxazole-trimethoprim 800 mg-160 mg oral tablet: 1 tab(s) orally 2 times a day

## 2023-06-09 NOTE — DISCHARGE NOTE NURSING/CASE MANAGEMENT/SOCIAL WORK - PATIENT PORTAL LINK FT
You can access the FollowMyHealth Patient Portal offered by [facility] by registering at the following website: [email or web address]. By joining [employer]’s FollowMyHealth portal, you will also be able to view your health information using other applications (apps) compatible with our system.

## 2023-06-13 DIAGNOSIS — E21.3 HYPERPARATHYROIDISM, UNSPECIFIED: ICD-10-CM

## 2023-06-13 DIAGNOSIS — I25.10 ATHEROSCLEROTIC HEART DISEASE OF NATIVE CORONARY ARTERY WITHOUT ANGINA PECTORIS: ICD-10-CM

## 2023-06-13 DIAGNOSIS — N20.0 CALCULUS OF KIDNEY: ICD-10-CM

## 2023-06-13 DIAGNOSIS — Z79.82 LONG TERM (CURRENT) USE OF ASPIRIN: ICD-10-CM

## 2023-06-13 DIAGNOSIS — N39.0 URINARY TRACT INFECTION, SITE NOT SPECIFIED: ICD-10-CM

## 2023-06-13 DIAGNOSIS — A41.9 SEPSIS, UNSPECIFIED ORGANISM: ICD-10-CM

## 2023-06-13 DIAGNOSIS — I10 ESSENTIAL (PRIMARY) HYPERTENSION: ICD-10-CM

## 2023-06-13 DIAGNOSIS — Z79.899 OTHER LONG TERM (CURRENT) DRUG THERAPY: ICD-10-CM

## 2023-06-13 DIAGNOSIS — J84.10 PULMONARY FIBROSIS, UNSPECIFIED: ICD-10-CM

## 2023-06-13 DIAGNOSIS — B96.20 UNSPECIFIED ESCHERICHIA COLI [E. COLI] AS THE CAUSE OF DISEASES CLASSIFIED ELSEWHERE: ICD-10-CM

## 2023-06-13 DIAGNOSIS — R73.03 PREDIABETES: ICD-10-CM

## 2023-06-13 DIAGNOSIS — Z95.5 PRESENCE OF CORONARY ANGIOPLASTY IMPLANT AND GRAFT: ICD-10-CM

## 2023-08-02 PROBLEM — J84.10 PULMONARY FIBROSIS, UNSPECIFIED: Chronic | Status: ACTIVE | Noted: 2023-06-04

## 2023-08-03 ENCOUNTER — RESULT REVIEW (OUTPATIENT)
Age: 83
End: 2023-08-03

## 2023-08-03 ENCOUNTER — OUTPATIENT (OUTPATIENT)
Dept: OUTPATIENT SERVICES | Facility: HOSPITAL | Age: 83
LOS: 1 days | End: 2023-08-03
Payer: MEDICARE

## 2023-08-03 ENCOUNTER — APPOINTMENT (OUTPATIENT)
Dept: MRI IMAGING | Facility: IMAGING CENTER | Age: 83
End: 2023-08-03
Payer: MEDICARE

## 2023-08-03 DIAGNOSIS — R42 DIZZINESS AND GIDDINESS: ICD-10-CM

## 2023-08-03 DIAGNOSIS — Z00.8 ENCOUNTER FOR OTHER GENERAL EXAMINATION: ICD-10-CM

## 2023-08-03 PROCEDURE — 70551 MRI BRAIN STEM W/O DYE: CPT | Mod: 26

## 2023-08-03 PROCEDURE — 70551 MRI BRAIN STEM W/O DYE: CPT

## 2023-08-14 NOTE — ED ADULT TRIAGE NOTE - ESI TRIAGE ACUITY LEVEL, MLM
[de-identified] : Ramonita presents for follow up of right ankle pain, doing well with physical therapy. She may return to activity as tolerated and follow up as needed. Can consider injection as desired.   Sugar Forrest MD, EdM Sports Medicine [facility] Department of Orthopedics
2

## 2023-12-06 ENCOUNTER — EMERGENCY (EMERGENCY)
Facility: HOSPITAL | Age: 83
LOS: 0 days | Discharge: HOME | End: 2023-12-06
Payer: MEDICARE

## 2023-12-06 VITALS
OXYGEN SATURATION: 100 % | HEART RATE: 97 BPM | HEIGHT: 56 IN | TEMPERATURE: 98 F | WEIGHT: 179.9 LBS | SYSTOLIC BLOOD PRESSURE: 141 MMHG | DIASTOLIC BLOOD PRESSURE: 77 MMHG | RESPIRATION RATE: 16 BRPM

## 2023-12-06 VITALS
HEART RATE: 86 BPM | OXYGEN SATURATION: 100 % | RESPIRATION RATE: 18 BRPM | DIASTOLIC BLOOD PRESSURE: 70 MMHG | SYSTOLIC BLOOD PRESSURE: 134 MMHG

## 2023-12-06 DIAGNOSIS — M79.601 PAIN IN RIGHT ARM: ICD-10-CM

## 2023-12-06 DIAGNOSIS — M25.511 PAIN IN RIGHT SHOULDER: ICD-10-CM

## 2023-12-06 DIAGNOSIS — M54.2 CERVICALGIA: ICD-10-CM

## 2023-12-06 DIAGNOSIS — M79.602 PAIN IN LEFT ARM: ICD-10-CM

## 2023-12-06 DIAGNOSIS — M25.512 PAIN IN LEFT SHOULDER: ICD-10-CM

## 2023-12-06 DIAGNOSIS — I10 ESSENTIAL (PRIMARY) HYPERTENSION: ICD-10-CM

## 2023-12-06 LAB
ALBUMIN SERPL ELPH-MCNC: 3.1 G/DL — LOW (ref 3.3–5)
ALBUMIN SERPL ELPH-MCNC: 3.1 G/DL — LOW (ref 3.3–5)
ALP SERPL-CCNC: 117 U/L — SIGNIFICANT CHANGE UP (ref 40–120)
ALP SERPL-CCNC: 117 U/L — SIGNIFICANT CHANGE UP (ref 40–120)
ALT FLD-CCNC: 23 U/L — SIGNIFICANT CHANGE UP (ref 12–78)
ALT FLD-CCNC: 23 U/L — SIGNIFICANT CHANGE UP (ref 12–78)
ANION GAP SERPL CALC-SCNC: 4 MMOL/L — LOW (ref 5–17)
ANION GAP SERPL CALC-SCNC: 4 MMOL/L — LOW (ref 5–17)
ANION GAP SERPL CALC-SCNC: 5 MMOL/L — SIGNIFICANT CHANGE UP (ref 5–17)
ANION GAP SERPL CALC-SCNC: 5 MMOL/L — SIGNIFICANT CHANGE UP (ref 5–17)
APPEARANCE UR: CLEAR — SIGNIFICANT CHANGE UP
APPEARANCE UR: CLEAR — SIGNIFICANT CHANGE UP
AST SERPL-CCNC: 21 U/L — SIGNIFICANT CHANGE UP (ref 15–37)
AST SERPL-CCNC: 21 U/L — SIGNIFICANT CHANGE UP (ref 15–37)
BACTERIA # UR AUTO: ABNORMAL /HPF
BACTERIA # UR AUTO: ABNORMAL /HPF
BASOPHILS # BLD AUTO: 0.04 K/UL — SIGNIFICANT CHANGE UP (ref 0–0.2)
BASOPHILS # BLD AUTO: 0.04 K/UL — SIGNIFICANT CHANGE UP (ref 0–0.2)
BASOPHILS NFR BLD AUTO: 0.4 % — SIGNIFICANT CHANGE UP (ref 0–2)
BASOPHILS NFR BLD AUTO: 0.4 % — SIGNIFICANT CHANGE UP (ref 0–2)
BILIRUB SERPL-MCNC: 0.4 MG/DL — SIGNIFICANT CHANGE UP (ref 0.2–1.2)
BILIRUB SERPL-MCNC: 0.4 MG/DL — SIGNIFICANT CHANGE UP (ref 0.2–1.2)
BILIRUB UR-MCNC: NEGATIVE — SIGNIFICANT CHANGE UP
BILIRUB UR-MCNC: NEGATIVE — SIGNIFICANT CHANGE UP
BUN SERPL-MCNC: 26 MG/DL — HIGH (ref 7–23)
CALCIUM SERPL-MCNC: 10.7 MG/DL — HIGH (ref 8.5–10.1)
CALCIUM SERPL-MCNC: 10.7 MG/DL — HIGH (ref 8.5–10.1)
CALCIUM SERPL-MCNC: 9.7 MG/DL — SIGNIFICANT CHANGE UP (ref 8.5–10.1)
CALCIUM SERPL-MCNC: 9.7 MG/DL — SIGNIFICANT CHANGE UP (ref 8.5–10.1)
CHLORIDE SERPL-SCNC: 101 MMOL/L — SIGNIFICANT CHANGE UP (ref 96–108)
CHLORIDE SERPL-SCNC: 101 MMOL/L — SIGNIFICANT CHANGE UP (ref 96–108)
CHLORIDE SERPL-SCNC: 99 MMOL/L — SIGNIFICANT CHANGE UP (ref 96–108)
CHLORIDE SERPL-SCNC: 99 MMOL/L — SIGNIFICANT CHANGE UP (ref 96–108)
CK SERPL-CCNC: 53 U/L — SIGNIFICANT CHANGE UP (ref 26–192)
CK SERPL-CCNC: 53 U/L — SIGNIFICANT CHANGE UP (ref 26–192)
CO2 SERPL-SCNC: 27 MMOL/L — SIGNIFICANT CHANGE UP (ref 22–31)
CO2 SERPL-SCNC: 27 MMOL/L — SIGNIFICANT CHANGE UP (ref 22–31)
CO2 SERPL-SCNC: 28 MMOL/L — SIGNIFICANT CHANGE UP (ref 22–31)
CO2 SERPL-SCNC: 28 MMOL/L — SIGNIFICANT CHANGE UP (ref 22–31)
COLOR SPEC: YELLOW — SIGNIFICANT CHANGE UP
COLOR SPEC: YELLOW — SIGNIFICANT CHANGE UP
CREAT SERPL-MCNC: 1.42 MG/DL — HIGH (ref 0.5–1.3)
CREAT SERPL-MCNC: 1.42 MG/DL — HIGH (ref 0.5–1.3)
CREAT SERPL-MCNC: 1.48 MG/DL — HIGH (ref 0.5–1.3)
CREAT SERPL-MCNC: 1.48 MG/DL — HIGH (ref 0.5–1.3)
DIFF PNL FLD: NEGATIVE — SIGNIFICANT CHANGE UP
DIFF PNL FLD: NEGATIVE — SIGNIFICANT CHANGE UP
EGFR: 35 ML/MIN/1.73M2 — LOW
EGFR: 35 ML/MIN/1.73M2 — LOW
EGFR: 37 ML/MIN/1.73M2 — LOW
EGFR: 37 ML/MIN/1.73M2 — LOW
EOSINOPHIL # BLD AUTO: 0.07 K/UL — SIGNIFICANT CHANGE UP (ref 0–0.5)
EOSINOPHIL # BLD AUTO: 0.07 K/UL — SIGNIFICANT CHANGE UP (ref 0–0.5)
EOSINOPHIL NFR BLD AUTO: 0.8 % — SIGNIFICANT CHANGE UP (ref 0–6)
EOSINOPHIL NFR BLD AUTO: 0.8 % — SIGNIFICANT CHANGE UP (ref 0–6)
EPI CELLS # UR: PRESENT
EPI CELLS # UR: PRESENT
GLUCOSE SERPL-MCNC: 124 MG/DL — HIGH (ref 70–99)
GLUCOSE SERPL-MCNC: 124 MG/DL — HIGH (ref 70–99)
GLUCOSE SERPL-MCNC: 146 MG/DL — HIGH (ref 70–99)
GLUCOSE SERPL-MCNC: 146 MG/DL — HIGH (ref 70–99)
GLUCOSE UR QL: NEGATIVE MG/DL — SIGNIFICANT CHANGE UP
GLUCOSE UR QL: NEGATIVE MG/DL — SIGNIFICANT CHANGE UP
HCT VFR BLD CALC: 40.7 % — SIGNIFICANT CHANGE UP (ref 34.5–45)
HCT VFR BLD CALC: 40.7 % — SIGNIFICANT CHANGE UP (ref 34.5–45)
HGB BLD-MCNC: 13.4 G/DL — SIGNIFICANT CHANGE UP (ref 11.5–15.5)
HGB BLD-MCNC: 13.4 G/DL — SIGNIFICANT CHANGE UP (ref 11.5–15.5)
IMM GRANULOCYTES NFR BLD AUTO: 0.3 % — SIGNIFICANT CHANGE UP (ref 0–0.9)
IMM GRANULOCYTES NFR BLD AUTO: 0.3 % — SIGNIFICANT CHANGE UP (ref 0–0.9)
KETONES UR-MCNC: NEGATIVE MG/DL — SIGNIFICANT CHANGE UP
KETONES UR-MCNC: NEGATIVE MG/DL — SIGNIFICANT CHANGE UP
LEUKOCYTE ESTERASE UR-ACNC: ABNORMAL
LEUKOCYTE ESTERASE UR-ACNC: ABNORMAL
LYMPHOCYTES # BLD AUTO: 1.94 K/UL — SIGNIFICANT CHANGE UP (ref 1–3.3)
LYMPHOCYTES # BLD AUTO: 1.94 K/UL — SIGNIFICANT CHANGE UP (ref 1–3.3)
LYMPHOCYTES # BLD AUTO: 21 % — SIGNIFICANT CHANGE UP (ref 13–44)
LYMPHOCYTES # BLD AUTO: 21 % — SIGNIFICANT CHANGE UP (ref 13–44)
MCHC RBC-ENTMCNC: 28.4 PG — SIGNIFICANT CHANGE UP (ref 27–34)
MCHC RBC-ENTMCNC: 28.4 PG — SIGNIFICANT CHANGE UP (ref 27–34)
MCHC RBC-ENTMCNC: 32.9 G/DL — SIGNIFICANT CHANGE UP (ref 32–36)
MCHC RBC-ENTMCNC: 32.9 G/DL — SIGNIFICANT CHANGE UP (ref 32–36)
MCV RBC AUTO: 86.2 FL — SIGNIFICANT CHANGE UP (ref 80–100)
MCV RBC AUTO: 86.2 FL — SIGNIFICANT CHANGE UP (ref 80–100)
MONOCYTES # BLD AUTO: 0.41 K/UL — SIGNIFICANT CHANGE UP (ref 0–0.9)
MONOCYTES # BLD AUTO: 0.41 K/UL — SIGNIFICANT CHANGE UP (ref 0–0.9)
MONOCYTES NFR BLD AUTO: 4.4 % — SIGNIFICANT CHANGE UP (ref 2–14)
MONOCYTES NFR BLD AUTO: 4.4 % — SIGNIFICANT CHANGE UP (ref 2–14)
NEUTROPHILS # BLD AUTO: 6.76 K/UL — SIGNIFICANT CHANGE UP (ref 1.8–7.4)
NEUTROPHILS # BLD AUTO: 6.76 K/UL — SIGNIFICANT CHANGE UP (ref 1.8–7.4)
NEUTROPHILS NFR BLD AUTO: 73.1 % — SIGNIFICANT CHANGE UP (ref 43–77)
NEUTROPHILS NFR BLD AUTO: 73.1 % — SIGNIFICANT CHANGE UP (ref 43–77)
NITRITE UR-MCNC: NEGATIVE — SIGNIFICANT CHANGE UP
NITRITE UR-MCNC: NEGATIVE — SIGNIFICANT CHANGE UP
NRBC # BLD: 0 /100 WBCS — SIGNIFICANT CHANGE UP (ref 0–0)
NRBC # BLD: 0 /100 WBCS — SIGNIFICANT CHANGE UP (ref 0–0)
PH UR: 7 — SIGNIFICANT CHANGE UP (ref 5–8)
PH UR: 7 — SIGNIFICANT CHANGE UP (ref 5–8)
PLATELET # BLD AUTO: 265 K/UL — SIGNIFICANT CHANGE UP (ref 150–400)
PLATELET # BLD AUTO: 265 K/UL — SIGNIFICANT CHANGE UP (ref 150–400)
POTASSIUM SERPL-MCNC: 4.1 MMOL/L — SIGNIFICANT CHANGE UP (ref 3.5–5.3)
POTASSIUM SERPL-MCNC: 4.1 MMOL/L — SIGNIFICANT CHANGE UP (ref 3.5–5.3)
POTASSIUM SERPL-MCNC: 4.5 MMOL/L — SIGNIFICANT CHANGE UP (ref 3.5–5.3)
POTASSIUM SERPL-MCNC: 4.5 MMOL/L — SIGNIFICANT CHANGE UP (ref 3.5–5.3)
POTASSIUM SERPL-SCNC: 4.1 MMOL/L — SIGNIFICANT CHANGE UP (ref 3.5–5.3)
POTASSIUM SERPL-SCNC: 4.1 MMOL/L — SIGNIFICANT CHANGE UP (ref 3.5–5.3)
POTASSIUM SERPL-SCNC: 4.5 MMOL/L — SIGNIFICANT CHANGE UP (ref 3.5–5.3)
POTASSIUM SERPL-SCNC: 4.5 MMOL/L — SIGNIFICANT CHANGE UP (ref 3.5–5.3)
PROT SERPL-MCNC: 7.3 GM/DL — SIGNIFICANT CHANGE UP (ref 6–8.3)
PROT SERPL-MCNC: 7.3 GM/DL — SIGNIFICANT CHANGE UP (ref 6–8.3)
PROT UR-MCNC: NEGATIVE MG/DL — SIGNIFICANT CHANGE UP
PROT UR-MCNC: NEGATIVE MG/DL — SIGNIFICANT CHANGE UP
RBC # BLD: 4.72 M/UL — SIGNIFICANT CHANGE UP (ref 3.8–5.2)
RBC # BLD: 4.72 M/UL — SIGNIFICANT CHANGE UP (ref 3.8–5.2)
RBC # FLD: 14.4 % — SIGNIFICANT CHANGE UP (ref 10.3–14.5)
RBC # FLD: 14.4 % — SIGNIFICANT CHANGE UP (ref 10.3–14.5)
RBC CASTS # UR COMP ASSIST: 0 /HPF — SIGNIFICANT CHANGE UP (ref 0–4)
RBC CASTS # UR COMP ASSIST: 0 /HPF — SIGNIFICANT CHANGE UP (ref 0–4)
SODIUM SERPL-SCNC: 131 MMOL/L — LOW (ref 135–145)
SODIUM SERPL-SCNC: 131 MMOL/L — LOW (ref 135–145)
SODIUM SERPL-SCNC: 133 MMOL/L — LOW (ref 135–145)
SODIUM SERPL-SCNC: 133 MMOL/L — LOW (ref 135–145)
SP GR SPEC: 1.02 — SIGNIFICANT CHANGE UP (ref 1–1.03)
SP GR SPEC: 1.02 — SIGNIFICANT CHANGE UP (ref 1–1.03)
UROBILINOGEN FLD QL: 0.2 MG/DL — SIGNIFICANT CHANGE UP (ref 0.2–1)
UROBILINOGEN FLD QL: 0.2 MG/DL — SIGNIFICANT CHANGE UP (ref 0.2–1)
WBC # BLD: 9.25 K/UL — SIGNIFICANT CHANGE UP (ref 3.8–10.5)
WBC # BLD: 9.25 K/UL — SIGNIFICANT CHANGE UP (ref 3.8–10.5)
WBC # FLD AUTO: 9.25 K/UL — SIGNIFICANT CHANGE UP (ref 3.8–10.5)
WBC # FLD AUTO: 9.25 K/UL — SIGNIFICANT CHANGE UP (ref 3.8–10.5)
WBC UR QL: SIGNIFICANT CHANGE UP /HPF (ref 0–5)
WBC UR QL: SIGNIFICANT CHANGE UP /HPF (ref 0–5)

## 2023-12-06 PROCEDURE — 73030 X-RAY EXAM OF SHOULDER: CPT | Mod: 26,50

## 2023-12-06 PROCEDURE — 73060 X-RAY EXAM OF HUMERUS: CPT | Mod: 26

## 2023-12-06 PROCEDURE — 99284 EMERGENCY DEPT VISIT MOD MDM: CPT

## 2023-12-06 RX ORDER — KETOROLAC TROMETHAMINE 30 MG/ML
15 SYRINGE (ML) INJECTION ONCE
Refills: 0 | Status: DISCONTINUED | OUTPATIENT
Start: 2023-12-06 | End: 2023-12-06

## 2023-12-06 RX ORDER — ACETAMINOPHEN 500 MG
975 TABLET ORAL ONCE
Refills: 0 | Status: COMPLETED | OUTPATIENT
Start: 2023-12-06 | End: 2023-12-06

## 2023-12-06 RX ORDER — METHOCARBAMOL 500 MG/1
1 TABLET, FILM COATED ORAL
Qty: 21 | Refills: 0
Start: 2023-12-06 | End: 2023-12-12

## 2023-12-06 RX ORDER — ACETAMINOPHEN 500 MG
1 TABLET ORAL
Qty: 21 | Refills: 0
Start: 2023-12-06 | End: 2023-12-12

## 2023-12-06 RX ORDER — DIAZEPAM 5 MG
2 TABLET ORAL ONCE
Refills: 0 | Status: DISCONTINUED | OUTPATIENT
Start: 2023-12-06 | End: 2023-12-06

## 2023-12-06 RX ORDER — IBUPROFEN 200 MG
600 TABLET ORAL ONCE
Refills: 0 | Status: COMPLETED | OUTPATIENT
Start: 2023-12-06 | End: 2023-12-06

## 2023-12-06 RX ORDER — SODIUM CHLORIDE 9 MG/ML
1000 INJECTION INTRAMUSCULAR; INTRAVENOUS; SUBCUTANEOUS ONCE
Refills: 0 | Status: COMPLETED | OUTPATIENT
Start: 2023-12-06 | End: 2023-12-06

## 2023-12-06 RX ADMIN — SODIUM CHLORIDE 1000 MILLILITER(S): 9 INJECTION INTRAMUSCULAR; INTRAVENOUS; SUBCUTANEOUS at 21:03

## 2023-12-06 RX ADMIN — Medication 600 MILLIGRAM(S): at 19:02

## 2023-12-06 RX ADMIN — Medication 2 MILLIGRAM(S): at 17:00

## 2023-12-06 RX ADMIN — Medication 975 MILLIGRAM(S): at 17:01

## 2023-12-06 NOTE — ED PROVIDER NOTE - NSFOLLOWUPINSTRUCTIONS_ED_ALL_ED_FT
Followup with your primary care doctor in the next 1-7 days.   Can take acetaminophen every 8 hours for pain as needed.   Can take muscle relaxant every 8 hours for muscle spasms as needed, do NOT exceed. Followup with your primary care doctor in the next 1-7 days.   Can take acetaminophen every 8 hours for pain as needed.   Can take muscle relaxant every 8 hours for muscle spasms as needed, do NOT exceed.    Beatris un seguimiento con whitt médico de atención primaria en los próximos 1 a 7 días.  Puede judith acetaminofén cada 8 horas para el dolor según sea necesario.  Puede judith relajante muscular cada 8 horas para los espasmos musculares según sea necesario, NO exceda.

## 2023-12-06 NOTE — ED PROVIDER NOTE - DATE/TIME 1
Additional Notes: Patient consent was obtained to proceed with the visit and recommended plan of care after discussion of all risks and benefits, including the risks of COVID-19 exposure.
Detail Level: Simple
06-Dec-2023 23:01

## 2023-12-06 NOTE — ED ADULT TRIAGE NOTE - CHIEF COMPLAINT QUOTE
As per daughter, b/l arm pain R>L x one week, increased pain yesterday. Seen by PCP yesterday and was recommended therapy and diclofenac cream. took iburprofen 800mg yesterday and had no relief.

## 2023-12-06 NOTE — ED ADULT NURSE NOTE - OBJECTIVE STATEMENT
Patient received A&O x4 breathing unlabored complaining of bilateral shoulder pain that radiates to upper arm since last week Friday. Patient denies any trauma or injuries to site. No swelling noted, LROM to site.

## 2023-12-06 NOTE — ED PROVIDER NOTE - CLINICAL SUMMARY MEDICAL DECISION MAKING FREE TEXT BOX
83-year-old female with past medical history of hypertension, prediabetes, 1 poorly kidney function with normal creatinine, Belarusian-speaking presents emergency room with daughter for bilateral atraumatic arm pain x 1 week. Seen by PMD last week and given medications without relief.  Followed up 2 days ago was given a prescription for x-rays and physical therapy.  Patient wants to go for x-rays today but was having too much pain so came to the ER instead.  Denies fever, chills, chest pain, shortness of breath, difficulty breathing, numbness, tingling, weakness. No meds today. Vital signs stable, bilateral  paraspinal cervical tenderness and bilateral shoulder pain with dec ROM/strength, no deformity, neurovascularly intact. Likely MSK pain/spasm vs arthritis, less likely fracture or dislocation. Will get labs, XR, meds, reassess. Dispo pending results. 83-year-old female with past medical history of hypertension, prediabetes, 1 poorly kidney function with normal creatinine, Maltese-speaking presents emergency room with daughter for bilateral atraumatic arm pain x 1 week. Seen by PMD last week and given medications without relief.  Followed up 2 days ago was given a prescription for x-rays and physical therapy.  Patient wants to go for x-rays today but was having too much pain so came to the ER instead.  Denies fever, chills, chest pain, shortness of breath, difficulty breathing, numbness, tingling, weakness. No meds today. Vital signs stable, bilateral  paraspinal cervical tenderness and bilateral shoulder pain with dec ROM/strength, no deformity, neurovascularly intact. Likely MSK pain/spasm vs arthritis, less likely fracture or dislocation. Will get labs, XR, meds, reassess. Dispo pending results.

## 2023-12-06 NOTE — ED ADULT NURSE REASSESSMENT NOTE - NS ED NURSE REASSESS COMMENT FT1
Pt AOx4 with steady gait. Pt reports no acute distress at this time. Pt accepts the d/c from the MD and IV hep lock remvoed.

## 2023-12-06 NOTE — ED PROVIDER NOTE - OBJECTIVE STATEMENT
83-year-old female with past medical history of hypertension, prediabetes, 1 poorly kidney function with normal creatinine, Romansh-speaking presents emergency room with daughter for arm pain.  Daughter interpreting, reports bilateral atraumatic arm pain for the past week.  Seen by PMD last week and given medications without relief.  Followed up 2 days ago was given a prescription for x-rays and physical therapy.  Patient wants to go for x-rays today but was having too much pain so came to the ER instead.  Denies fever, chills, chest pain, shortness of breath, difficulty breathing, numbness, tingling, weakness.  Patient taking Tylenol or Motrin as needed for pain, no pain medications given today.  Patient not on any blood thinners. 83-year-old female with past medical history of hypertension, prediabetes, 1 poorly kidney function with normal creatinine, Latvian-speaking presents emergency room with daughter for arm pain.  Daughter interpreting, reports bilateral atraumatic arm pain for the past week.  Seen by PMD last week and given medications without relief.  Followed up 2 days ago was given a prescription for x-rays and physical therapy.  Patient wants to go for x-rays today but was having too much pain so came to the ER instead.  Denies fever, chills, chest pain, shortness of breath, difficulty breathing, numbness, tingling, weakness.  Patient taking Tylenol or Motrin as needed for pain, no pain medications given today.  Patient not on any blood thinners.

## 2023-12-06 NOTE — ED PROVIDER NOTE - PROGRESS NOTE DETAILS
patient daughter updated regarding results. MC mildly improved (1.42) s/p fluid bolus. patient is well appearing, pain improved per daughter. she is comfortable to take patient home w/ primary care doctor followup within the next 1 week. encouraged PO hydration to patient  and daughter. she verbalizes understanding. pain medications to be sent to [facility] per her request. -Carlos

## 2023-12-06 NOTE — ED PROVIDER NOTE - PATIENT PORTAL LINK FT
You can access the FollowMyHealth Patient Portal offered by [facility] by registering at the following website: [email or web address]. By joining [employer]’s FollowMyHealth portal, you will also be able to view your health information using other applications (apps) compatible with our system. You can access the FollowMyHealth Patient Portal offered by [facility] by registering at the following website: [email or web address]. By joining [employer]’s FollowMyHealth portal, you will also be able to view your health information using other applications (apps) compatible with our system.

## 2023-12-06 NOTE — ED ADULT NURSE NOTE - NSFALLUNIVINTERV_ED_ALL_ED
Bed/Stretcher in lowest position, wheels locked, appropriate side rails in place/Call bell, personal items and telephone in reach/Instruct patient to call for assistance before getting out of bed/chair/stretcher/Non-slip footwear applied when patient is off stretcher/[place] to call system/Physically safe environment - no spills, clutter or unnecessary equipment/Purposeful proactive rounding/Room/bathroom lighting operational, light cord in reach Bed/Stretcher in lowest position, wheels locked, appropriate side rails in place/Call bell, personal items and telephone in reach/Instruct patient to call for assistance before getting out of bed/chair/stretcher/Non-slip footwear applied when patient is off stretcher/[place] to call system/Physically safe environment - no spills, clutter or unnecessary equipment/Purposeful proactive rounding/Room/bathroom lighting operational, light cord in reach

## 2023-12-07 ENCOUNTER — APPOINTMENT (OUTPATIENT)
Dept: ORTHOPEDIC SURGERY | Facility: CLINIC | Age: 83
End: 2023-12-07
Payer: MEDICARE

## 2023-12-07 VITALS — WEIGHT: 178 LBS | BODY MASS INDEX: 40.04 KG/M2 | HEIGHT: 56 IN

## 2023-12-07 PROCEDURE — 20610 DRAIN/INJ JOINT/BURSA W/O US: CPT | Mod: LT

## 2023-12-07 PROCEDURE — J3490M: CUSTOM

## 2023-12-07 PROCEDURE — 99204 OFFICE O/P NEW MOD 45 MIN: CPT | Mod: 25

## 2023-12-07 PROCEDURE — 72040 X-RAY EXAM NECK SPINE 2-3 VW: CPT

## 2023-12-07 RX ORDER — CELECOXIB 100 MG/1
100 CAPSULE ORAL TWICE DAILY
Qty: 30 | Refills: 0 | Status: ACTIVE | COMMUNITY
Start: 2023-12-07 | End: 1900-01-01

## 2023-12-10 LAB
-  AMOXICILLIN/CLAVULANIC ACID: SIGNIFICANT CHANGE UP
-  AMOXICILLIN/CLAVULANIC ACID: SIGNIFICANT CHANGE UP
-  AMPICILLIN/SULBACTAM: SIGNIFICANT CHANGE UP
-  AMPICILLIN/SULBACTAM: SIGNIFICANT CHANGE UP
-  AMPICILLIN: SIGNIFICANT CHANGE UP
-  AMPICILLIN: SIGNIFICANT CHANGE UP
-  AZTREONAM: SIGNIFICANT CHANGE UP
-  AZTREONAM: SIGNIFICANT CHANGE UP
-  CEFAZOLIN: SIGNIFICANT CHANGE UP
-  CEFAZOLIN: SIGNIFICANT CHANGE UP
-  CEFEPIME: SIGNIFICANT CHANGE UP
-  CEFEPIME: SIGNIFICANT CHANGE UP
-  CEFOXITIN: SIGNIFICANT CHANGE UP
-  CEFOXITIN: SIGNIFICANT CHANGE UP
-  CEFTRIAXONE: SIGNIFICANT CHANGE UP
-  CEFTRIAXONE: SIGNIFICANT CHANGE UP
-  CEFUROXIME: SIGNIFICANT CHANGE UP
-  CEFUROXIME: SIGNIFICANT CHANGE UP
-  CIPROFLOXACIN: SIGNIFICANT CHANGE UP
-  CIPROFLOXACIN: SIGNIFICANT CHANGE UP
-  ERTAPENEM: SIGNIFICANT CHANGE UP
-  ERTAPENEM: SIGNIFICANT CHANGE UP
-  GENTAMICIN: SIGNIFICANT CHANGE UP
-  GENTAMICIN: SIGNIFICANT CHANGE UP
-  IMIPENEM: SIGNIFICANT CHANGE UP
-  IMIPENEM: SIGNIFICANT CHANGE UP
-  LEVOFLOXACIN: SIGNIFICANT CHANGE UP
-  LEVOFLOXACIN: SIGNIFICANT CHANGE UP
-  MEROPENEM: SIGNIFICANT CHANGE UP
-  MEROPENEM: SIGNIFICANT CHANGE UP
-  NITROFURANTOIN: SIGNIFICANT CHANGE UP
-  NITROFURANTOIN: SIGNIFICANT CHANGE UP
-  PIPERACILLIN/TAZOBACTAM: SIGNIFICANT CHANGE UP
-  PIPERACILLIN/TAZOBACTAM: SIGNIFICANT CHANGE UP
-  TOBRAMYCIN: SIGNIFICANT CHANGE UP
-  TOBRAMYCIN: SIGNIFICANT CHANGE UP
-  TRIMETHOPRIM/SULFAMETHOXAZOLE: SIGNIFICANT CHANGE UP
-  TRIMETHOPRIM/SULFAMETHOXAZOLE: SIGNIFICANT CHANGE UP
CULTURE RESULTS: ABNORMAL
CULTURE RESULTS: ABNORMAL
METHOD TYPE: SIGNIFICANT CHANGE UP
METHOD TYPE: SIGNIFICANT CHANGE UP
ORGANISM # SPEC MICROSCOPIC CNT: ABNORMAL
ORGANISM # SPEC MICROSCOPIC CNT: ABNORMAL
ORGANISM # SPEC MICROSCOPIC CNT: SIGNIFICANT CHANGE UP
ORGANISM # SPEC MICROSCOPIC CNT: SIGNIFICANT CHANGE UP
SPECIMEN SOURCE: SIGNIFICANT CHANGE UP
SPECIMEN SOURCE: SIGNIFICANT CHANGE UP

## 2023-12-12 RX ORDER — CEFPODOXIME PROXETIL 100 MG
1 TABLET ORAL
Qty: 14 | Refills: 0
Start: 2023-12-12 | End: 2023-12-18

## 2023-12-14 ENCOUNTER — APPOINTMENT (OUTPATIENT)
Dept: ORTHOPEDIC SURGERY | Facility: CLINIC | Age: 83
End: 2023-12-14
Payer: MEDICARE

## 2023-12-14 VITALS — HEIGHT: 56 IN | WEIGHT: 178 LBS | BODY MASS INDEX: 40.04 KG/M2

## 2023-12-14 DIAGNOSIS — M75.52 BURSITIS OF RIGHT SHOULDER: ICD-10-CM

## 2023-12-14 DIAGNOSIS — M47.812 SPONDYLOSIS W/OUT MYELOPATHY OR RADICULOPATHY, CERVICAL REGION: ICD-10-CM

## 2023-12-14 DIAGNOSIS — M75.51 BURSITIS OF RIGHT SHOULDER: ICD-10-CM

## 2023-12-14 DIAGNOSIS — M62.838 OTHER MUSCLE SPASM: ICD-10-CM

## 2023-12-14 PROCEDURE — 99213 OFFICE O/P EST LOW 20 MIN: CPT | Mod: 25

## 2023-12-14 PROCEDURE — J3490M: CUSTOM

## 2023-12-14 PROCEDURE — 20610 DRAIN/INJ JOINT/BURSA W/O US: CPT | Mod: RT

## 2023-12-14 NOTE — DISCUSSION/SUMMARY
[Medication Risks Reviewed] : Medication risks reviewed [de-identified] : Plan for right  shoulder SAC injection and Celebrex   ----------------------------------------------------------------------------  All relevant imaging studies pertinent to today's visit, including x-rays, MRI's and/or other advanced imaging studies (CT/etc) were independently interpreted and reviewed with the patient as needed. Implications of the studies together with the patient's clinical picture were discussed to formulate a working diagnosis and management options were detailed.  The patient was advised of the diagnosis.  The natural history of the pathology was explained in full. All questions were answered.  The risks and benefits of conservative and interventional treatment alternatives were explained to the patient  ----------------------------------------------------------------------------  Large joint corticosteroid injection given: right shoulder subacromial  Patient indicated for injection after trial of rest, OTC medications including aspirin, Ibuprofen, Aleve etc or prescription NSAIDS, and/or exercises at home and/ or physical therapy without satisfactory response.  Patient has symptoms including pain, swelling, and/or decreased mobility in the joint. The risks, benefits, and alternatives to corticosteroid injection were explained in full to the patient, including but not limited to infection, sepsis, bleeding, scarring, skin discoloration, temporary increase in pain, syncopal episode, failure to resolve symptoms, allergic reaction, symptom recurrence, and elevation of blood sugar in diabetics. Patient understood the risks. All questions were answered. After discussion of options, patient requested an injection.   Oral informed consent was obtained and sterile technique was utilized for the procedure including the preparation of the solutions used for the injection and betadine followed by alcohol prep to the injection site. Anesthesia was given with ethyl chloride sprayed topically. The injection was delivered. Patient tolerated the procedure well.   Post Procedure Instructions: Patient was advised to call if redness, pain, or fever occur and apply ice for 15 min on and 15 min off later today  Medications delivered: Kenalog: 10 mg, Lidocaine: 4 cc, Marcaine: 4 cc.   ----------------------------------------------------------------------------  Patient warned of specific risks of medication related to bleeding, GI issues, increase blood pressure, and cardiac risks in addition to additional risks.  Patient advised to discuss with PMD  if any presence of stated issues.

## 2023-12-14 NOTE — IMAGING
[Facet arthropathy] : Facet arthropathy [Disc space narrowing] : Disc space narrowing [Bilateral] : shoulder bilaterally [Outside films reviewed] : Outside films reviewed [Type 2 acromion] : Type 2 acromion [de-identified] :   ----------------------------------------------------------------------------   Bilateral shoulder exam:   Inspection: no obvious deformity, no obvious masses, no swelling, no effusion, no atrophy ROM: gaurding    FF: 90    ER: 45    IR: s1 Tenderness:    (+L>R) Anterior/Biceps:    (+L) Posterior    (neg) Lateral    (+) diffusely tender  Trapezius    (neg) Scapula    (neg) AC joint    (neg) Crepitus with ROM Stability:    (neg) Translation    (neg) Apprehension    (neg) Clicking Additional tests:    (+) Neer's    (+)Hawkin's    (neg) Truong's    (neg) Speed    (neg) Cross chest adduction Strength:    FF: 5-/5 pain L>R    ER: 5/5    IR: 5/5    Biceps: 5/5    Triceps: 5/5    Distal: 5/5 Neuro: In tact to light touch throughout Vascularity: Extremity warm and well perfused   [FreeTextEntry1] : Left acromial spur

## 2023-12-14 NOTE — REASON FOR VISIT
[FreeTextEntry2] : follow up bilateral shoulders. Pt did welll with CSI injection to the left shoulder. Still having pain on the right.

## 2023-12-14 NOTE — HISTORY OF PRESENT ILLNESS
[Sudden] : sudden [10] : 10 [Sharp] : sharp [Tingling] : tingling [Frequent] : frequent [Meds] : meds [de-identified] : This is Ms. JOSE M ALANCHADWICK  a 83 year old female who comes in today complaining of bilateral shoulder pain for one week with no injury.  She went to urgent care and the ER because the pain was so bad.  took ibu 800 w no improvement.  [] : no [FreeTextEntry7] : hands [de-identified] : PCP/ER [de-identified] : xray

## 2024-02-02 ENCOUNTER — APPOINTMENT (OUTPATIENT)
Dept: ORTHOPEDIC SURGERY | Facility: CLINIC | Age: 84
End: 2024-02-02

## 2024-05-09 ENCOUNTER — EMERGENCY (EMERGENCY)
Facility: HOSPITAL | Age: 84
LOS: 0 days | Discharge: HOME | End: 2024-05-09
Attending: EMERGENCY MEDICINE
Payer: MEDICARE

## 2024-05-09 VITALS
HEART RATE: 86 BPM | DIASTOLIC BLOOD PRESSURE: 65 MMHG | TEMPERATURE: 99 F | RESPIRATION RATE: 17 BRPM | OXYGEN SATURATION: 96 % | SYSTOLIC BLOOD PRESSURE: 117 MMHG

## 2024-05-09 VITALS
HEART RATE: 108 BPM | DIASTOLIC BLOOD PRESSURE: 87 MMHG | SYSTOLIC BLOOD PRESSURE: 136 MMHG | HEIGHT: 60 IN | RESPIRATION RATE: 20 BRPM | OXYGEN SATURATION: 98 % | WEIGHT: 188.94 LBS | TEMPERATURE: 99 F

## 2024-05-09 DIAGNOSIS — Z20.822 CONTACT WITH AND (SUSPECTED) EXPOSURE TO COVID-19: ICD-10-CM

## 2024-05-09 DIAGNOSIS — R50.9 FEVER, UNSPECIFIED: ICD-10-CM

## 2024-05-09 DIAGNOSIS — B34.9 VIRAL INFECTION, UNSPECIFIED: ICD-10-CM

## 2024-05-09 DIAGNOSIS — Z87.09 PERSONAL HISTORY OF OTHER DISEASES OF THE RESPIRATORY SYSTEM: ICD-10-CM

## 2024-05-09 DIAGNOSIS — R52 PAIN, UNSPECIFIED: ICD-10-CM

## 2024-05-09 DIAGNOSIS — R05.9 COUGH, UNSPECIFIED: ICD-10-CM

## 2024-05-09 LAB
ALBUMIN SERPL ELPH-MCNC: 2.9 G/DL — LOW (ref 3.3–5)
ALP SERPL-CCNC: 118 U/L — SIGNIFICANT CHANGE UP (ref 40–120)
ALT FLD-CCNC: 26 U/L — SIGNIFICANT CHANGE UP (ref 12–78)
ANION GAP SERPL CALC-SCNC: 4 MMOL/L — LOW (ref 5–17)
APTT BLD: 30.4 SEC — SIGNIFICANT CHANGE UP (ref 24.5–35.6)
AST SERPL-CCNC: 26 U/L — SIGNIFICANT CHANGE UP (ref 15–37)
BASOPHILS # BLD AUTO: 0.02 K/UL — SIGNIFICANT CHANGE UP (ref 0–0.2)
BASOPHILS NFR BLD AUTO: 0.3 % — SIGNIFICANT CHANGE UP (ref 0–2)
BILIRUB SERPL-MCNC: 0.5 MG/DL — SIGNIFICANT CHANGE UP (ref 0.2–1.2)
BUN SERPL-MCNC: 18 MG/DL — SIGNIFICANT CHANGE UP (ref 7–23)
CALCIUM SERPL-MCNC: 10.6 MG/DL — HIGH (ref 8.5–10.1)
CHLORIDE SERPL-SCNC: 99 MMOL/L — SIGNIFICANT CHANGE UP (ref 96–108)
CO2 SERPL-SCNC: 33 MMOL/L — HIGH (ref 22–31)
CREAT SERPL-MCNC: 1.06 MG/DL — SIGNIFICANT CHANGE UP (ref 0.5–1.3)
EGFR: 52 ML/MIN/1.73M2 — LOW
EOSINOPHIL # BLD AUTO: 0.05 K/UL — SIGNIFICANT CHANGE UP (ref 0–0.5)
EOSINOPHIL NFR BLD AUTO: 0.8 % — SIGNIFICANT CHANGE UP (ref 0–6)
FLUAV AG NPH QL: SIGNIFICANT CHANGE UP
FLUBV AG NPH QL: SIGNIFICANT CHANGE UP
GLUCOSE SERPL-MCNC: 152 MG/DL — HIGH (ref 70–99)
HCT VFR BLD CALC: 42.1 % — SIGNIFICANT CHANGE UP (ref 34.5–45)
HGB BLD-MCNC: 13.2 G/DL — SIGNIFICANT CHANGE UP (ref 11.5–15.5)
IMM GRANULOCYTES NFR BLD AUTO: 0.6 % — SIGNIFICANT CHANGE UP (ref 0–0.9)
INR BLD: 0.97 RATIO — SIGNIFICANT CHANGE UP (ref 0.85–1.18)
LYMPHOCYTES # BLD AUTO: 0.94 K/UL — LOW (ref 1–3.3)
LYMPHOCYTES # BLD AUTO: 14.8 % — SIGNIFICANT CHANGE UP (ref 13–44)
MCHC RBC-ENTMCNC: 28.1 PG — SIGNIFICANT CHANGE UP (ref 27–34)
MCHC RBC-ENTMCNC: 31.4 G/DL — LOW (ref 32–36)
MCV RBC AUTO: 89.8 FL — SIGNIFICANT CHANGE UP (ref 80–100)
MONOCYTES # BLD AUTO: 0.69 K/UL — SIGNIFICANT CHANGE UP (ref 0–0.9)
MONOCYTES NFR BLD AUTO: 10.8 % — SIGNIFICANT CHANGE UP (ref 2–14)
NEUTROPHILS # BLD AUTO: 4.62 K/UL — SIGNIFICANT CHANGE UP (ref 1.8–7.4)
NEUTROPHILS NFR BLD AUTO: 72.7 % — SIGNIFICANT CHANGE UP (ref 43–77)
NRBC # BLD: 0 /100 WBCS — SIGNIFICANT CHANGE UP (ref 0–0)
PLATELET # BLD AUTO: 193 K/UL — SIGNIFICANT CHANGE UP (ref 150–400)
POTASSIUM SERPL-MCNC: 4.2 MMOL/L — SIGNIFICANT CHANGE UP (ref 3.5–5.3)
POTASSIUM SERPL-SCNC: 4.2 MMOL/L — SIGNIFICANT CHANGE UP (ref 3.5–5.3)
PROT SERPL-MCNC: 7.4 GM/DL — SIGNIFICANT CHANGE UP (ref 6–8.3)
PROTHROM AB SERPL-ACNC: 11.7 SEC — SIGNIFICANT CHANGE UP (ref 9.5–13)
RBC # BLD: 4.69 M/UL — SIGNIFICANT CHANGE UP (ref 3.8–5.2)
RBC # FLD: 14.4 % — SIGNIFICANT CHANGE UP (ref 10.3–14.5)
SARS-COV-2 RNA SPEC QL NAA+PROBE: SIGNIFICANT CHANGE UP
SODIUM SERPL-SCNC: 136 MMOL/L — SIGNIFICANT CHANGE UP (ref 135–145)
TROPONIN I, HIGH SENSITIVITY RESULT: 8.4 NG/L — SIGNIFICANT CHANGE UP
WBC # BLD: 6.36 K/UL — SIGNIFICANT CHANGE UP (ref 3.8–10.5)
WBC # FLD AUTO: 6.36 K/UL — SIGNIFICANT CHANGE UP (ref 3.8–10.5)

## 2024-05-09 PROCEDURE — 99285 EMERGENCY DEPT VISIT HI MDM: CPT

## 2024-05-09 PROCEDURE — 71045 X-RAY EXAM CHEST 1 VIEW: CPT | Mod: 26

## 2024-05-09 RX ORDER — SODIUM CHLORIDE 9 MG/ML
1000 INJECTION INTRAMUSCULAR; INTRAVENOUS; SUBCUTANEOUS ONCE
Refills: 0 | Status: COMPLETED | OUTPATIENT
Start: 2024-05-09 | End: 2024-05-09

## 2024-05-09 RX ORDER — ACETAMINOPHEN 500 MG
975 TABLET ORAL ONCE
Refills: 0 | Status: COMPLETED | OUTPATIENT
Start: 2024-05-09 | End: 2024-05-09

## 2024-05-09 RX ADMIN — Medication 975 MILLIGRAM(S): at 16:28

## 2024-05-09 RX ADMIN — SODIUM CHLORIDE 1000 MILLILITER(S): 9 INJECTION INTRAMUSCULAR; INTRAVENOUS; SUBCUTANEOUS at 16:20

## 2024-05-09 NOTE — ED PROVIDER NOTE - NSFOLLOWUPINSTRUCTIONS_ED_ALL_ED_FT
Enfermedades virales en los adultos  Viral Illness, Adult  Los virus son microbios diminutos que entran en el organismo de estrella persona y causan enfermedades. Hay muchos tipos diferentes de virus. Y causan muchos tipos de enfermedades. Las enfermedades virales pueden ser leves o graves. Pueden afectar diferentes partes del cuerpo.    Entre las afecciones a corto plazo causadas por virus, se incluyen los resfríos, la gripe y los virus estomacales. Entre las afecciones a kenny plazo causadas por un virus, se incluyen el herpes, la culebrilla y la infección por el virus de inmunodeficiencia humana (VIH). Se bah identificado unos pocos virus asociados con determinados tipos de cáncer.    ¿Cuáles son las causas?  Muchos tipos de virus pueden causar enfermedades. Los virus ingresan en las células del organismo, se multiplican y provocan que las células infectadas funcionen de manera diferente o mueran. Cuando estas células mueren, liberan más virus. Cuando esto ocurre, aparecen los síntomas de la enfermedad, y el virus se disemina a otras células. Si el virus domina el funcionamiento de la célula, puede hacer que esta se divida y prolifere de manera descontrolada. [place] ocurre cuando un virus causa cáncer.    Los diferentes virus ingresan al organismo de distintas formas. Puede contraer un virus de la siguiente manera:  Al ingerir alimentos o beber agua que bah entrado en contacto con el virus.  Al inhalar gotitas que estrella persona infectada liberó en el aire al toser o estornudar.  Al tocar estrella superficie que tenga el virus y luego llevarse la mano a la boca, la nariz o los ojos.  Al ser akira por un insecto o mordido por un animal que son portadores del virus.  Al tener contacto sexual con estrella persona infectada por el virus.  Al tener contacto con luiz o líquidos que contienen el virus, ya sea a través de un jerel abierto o jackie estrella transfusión.  Si el virus ingresa al organismo, el sistema del cuerpo que combate las enfermedades (sistema inmunitario) intentará combatirlo. Puede correr un riesgo más alto de tener estrella enfermedad viral si tiene el sistema inmunitario debilitado.    ¿Cuáles son los signos o síntomas?  Los síntomas dependen del tipo de virus y de la ubicación de las células en las que ingresa. Entre los síntomas se pueden incluir los siguientes:  Con los virus del resfrío y de la gripe:  Fiebre.  Dolor de aron.  Dolor de garganta.  Macey musculares.  Nariz tapada (congestión nasal).  Tos.  Con los virus estomacales (gastrointestinales):  Fiebre.  Dolor en el abdomen.  Náuseas o vómitos.  Diarrea.  Con los virus hepáticos (hepatitis):  Pérdida del apetito.  Cansancio.  La piel o las partes brian de los ojos se ponen surya (ictericia).  Con los virus del cerebro y la médula vance:  Fiebre.  Dolor de aron.  Rigidez en el merary.  Náuseas y vómitos.  Confusión o somnolencia.  Con los virus de la piel:  Verrugas.  Picazón.  Erupción cutánea.  Con los virus de transmisión sexual:  Secreción.  Hinchazón.  Enrojecimiento.  Erupción cutánea.  ¿Cómo se diagnostica?  Esta afección se puede diagnosticar en función de estrella o más de las siguientes evaluaciones:  Los síntomas y antecedentes médicos.  Un examen físico.  Pruebas, ramin, por ejemplo:  Análisis de luiz.  Análisis de estrella muestra de mucosidad de los pulmones (muestra de esputo).  Análisis de estrella muestra de materia fecal (heces).  Análisis de un hisopado de líquidos corporales o estrella llaga en la piel (lesión).  ¿Cómo se trata?  Los virus pueden ser difíciles de tratar porque se hospedan en las células. Los antibióticos no tratan los virus porque estos medicamentos no llegan al interior de las células. El tratamiento de estrella enfermedad viral puede incluir lo siguiente:  Descansar y beber mucho líquido.  Medicamentos para tratar los síntomas. Estos pueden incluir medicamentos de venta bety para el dolor y la fiebre, medicamentos para la tos o la congestión y medicamentos para la diarrea.  Medicamentos antivirales. Estos medicamentos están disponibles únicamente para ciertos tipos de virus.  Algunas enfermedades virales pueden evitarse con vacunas. Un ejemplo frecuente es la vacuna antigripal.    Siga estas indicaciones en whitt casa:  Medicamentos    Use los medicamentos de venta bety y los recetados solamente ramin se lo haya indicado el médico.  Si le recetaron un medicamento antiviral, tómelo ramin se lo haya indicado el médico. No deje de judith el antiviral aunque comience a sentirse mejor.  Sepa cuándo los antibióticos son necesarios y cuándo no. Los antibióticos no combaten a los virus. Si tiene estrella infección viral y el médico bill que también tiene estrella infección bacteriana, o que está en riesgo de contraerla, olaf vez le recete un antibiótico.  No solicite estrella receta para antibióticos si le bah diagnosticado estrella enfermedad viral. Los antibióticos no harán que se cure más rápidamente.  Judith antibióticos cuando no son necesarios puede derivar en resistencia a los antibióticos. Cuando esto ocurre, el medicamento pierde whitt eficacia contra las bacterias que normalmente combate.  Indicaciones generales    Tammie suficiente líquido para mantener el pis (la orina) de color amarillo pálido.  Descanse todo lo que pueda.  Retome amelia actividades normales ramin se lo haya indicado el médico. Pregúntele al médico qué actividades son seguras para usted.  ¿Cómo se previene?  A person washing hands with soap and water.  Para reducir el riesgo de contraer otra enfermedad viral:  Lávese las toña frecuentemente con agua y jabón jackie al menos 20 segundos. Use desinfectante para toña si no dispone de agua y jabón.  Evite tocarse la nariz, los ojos y la boca, sobre todo si no se lavó las toña recientemente.  Si un miembro de la martin tiene estrella infección viral, limpie todas las superficies de la casa que puedan maranda estado en contacto con el virus. Use [place] y jabón. También puede usar estrella solución de preparación comercial que contenga lejía.  Manténgase lejos de las personas enfermas con síntomas de estrella infección viral.  No comparta objetos tales ramin cepillos de dientes y botellas de agua con otras personas.  Mantenga las vacunas al día. [place] incluye recibir la vacuna antigripal todos los años.  Siga estrella dieta saludable y descanse lo suficiente.  Comuníquese con un médico si:  Tiene síntomas de estrella enfermedad viral que no desaparecen.  Los síntomas regresan después de maranda desaparecido.  Amelia síntomas empeoran.  Solicite ayuda de inmediato si:  Tiene dificultad para respirar.  Siente un dolor de aron intenso o rigidez en el merary.  Tiene vómitos o dolor abdominal intensos.  Estos síntomas pueden indicar estrella emergencia. Solicite ayuda de inmediato. Llame al 911.  No espere a jono si los síntomas desaparecen.  No conduzca por amelia propios medios hasta el hospital.  Esta información no tiene ramin fin reemplazar el consejo del médico. Asegúrese de hacerle al médico cualquier pregunta que tenga.

## 2024-05-09 NOTE — ED ADULT NURSE NOTE - NSFALLUNIVINTERV_ED_ALL_ED
Bed/Stretcher in lowest position, wheels locked, appropriate side rails in place/Call bell, personal items and telephone in reach/Instruct patient to call for assistance before getting out of bed/chair/stretcher/Non-slip footwear applied when patient is off stretcher/[place] to call system/Physically safe environment - no spills, clutter or unnecessary equipment/Purposeful proactive rounding/Room/bathroom lighting operational, light cord in reach

## 2024-05-09 NOTE — ED PROVIDER NOTE - CLINICAL SUMMARY MEDICAL DECISION MAKING FREE TEXT BOX
"HPI - Neuro Symptoms/Deficit    
General    
Chief Complaint: Neuro Symptoms/Deficit    
Stated Complaint: right side numbness from neck through armm    
Time Seen by Provider: 03/02/20 05:05    
Source: patient    
Mode of arrival: Ambulatory    
Limitations: no limitations    
History of Present Illness    
HPI Narrative: 60-year-old male nonsmoker with history of hypertension presents   
with a chief complaint right arm numbness tingling and heaviness since 03/30   
this morning.  He went to bed at about 5:00 p.m. and was in his normal state of   
health and woke up feeling this way, he thought that maybe he slept on wrong   
that it would just wear off.  He denies any blurred vision or trouble with   
speech but does state that he had some balance issues earlier.  He denies any   
history of the same.  He has had no headache and denies any traumatic injuries.    
Onset (ago): hour(s)    
Location: right arm    
History of same: No    
Severity: moderate    
Quality: weak, numb and tingling    
Relieving factors: none    
Exacerbating factors: none    
On Anticoagulants: No    
Associated symptoms: denies other symptoms    
Treatments Prior to Arrival: none    
Related Data    
                                Home Medications    
    
    
    
 Medication  Instructions  Recorded  Confirmed    
     
ibuprofen 200 mg PO Q6HP #0 01/25/13 10/24/19    
     
MULTIVITAMIN 1 cap PO QDAY #0 02/21/13 10/24/19    
    
    
                                  Previous Rx's    
    
    
    
 Medication  Instructions  Recorded    
     
prednisone 20 mg tablet See Rx Instructions PO .COMPLEX #8 10/24/19    
    
 tab     
    
    
    
                                    Allergies    
    
    
    
Allergy/AdvReac Type Severity Reaction Status Date / Time    
     
No Known Allergies Allergy   Verified 03/02/20 05:22    
    
    
    
    
Review of Systems    
Constitutional    
Constitutional: Denies chills, Denies fatigue, Denies fever(s), Denies frequent   
falls, Denies lethargy and Reports weakness    
Eyes    
Eyes: Denies change in vision, Denies eye discharge, Denies irritation and   
Denies loss of vision    
ENT    
Ears, Nose, Mouth, and Throat: Denies change in voice, Denies dizziness, Denies   
neck pain, Denies sore throat and Denies throat swelling    
Cardiovascular    
Cardiovascular: Denies chest pain, Denies irregular heart rhythm, Denies   
lightheadedness, Denies palpitations, Denies dyspnea, Denies dyspnea on exertion  
and Denies orthopnea    
Respiratory    
Respiratory: Denies cough, Denies dyspnea, Denies dyspnea on exertion and Denies  
wheezing    
Gastrointestinal    
Gastrointestinal: Denies abdominal pain, Denies change in bowel habits, Denies   
diarrhea, Denies nausea and Denies vomiting    
Genitourinary    
Genitourinary: Denies hematuria, Denies flank pain, Denies urinary incontinence   
and Denies urinary urgency    
Musculoskeletal    
Musculoskeletal: Denies back pain, Denies muscle weakness, Denies neck pain,   
Reports numbness and Denies tingling    
Integumentary/Breasts    
Skin/Breast: Denies pruritus, Denies erythema, Denies rash and Denies wounds    
Neurologic    
Neurologic: Denies behavioral changes, Denies confusion, Denies dizziness,   
Denies frequent falls, Denies loss of vision, Reports numbness, Denies tingling   
and Reports weakness    
Psychiatric    
Psychiatric: Denies anxiety, Denies behavioral changes, Denies confusion, Denies  
depression, Denies homicidal ideation and Denies suicidal ideation    
Endocrine    
Endocrine: Denies fatigue, Denies flushing and Denies palpitations    
Hematologic/Lymphatic    
Hematologic/Lymphatic: Denies easy bruising    
Allergic/Immunologic    
Allergic/Immunologic: Denies urticaria, Denies throat swelling and Denies   
wheezing    
    
Patient History    
Medical History (Reviewed 03/02/20 @ 06:06 by Nimesh Goddard DO)    
   
[id number]|2020-03-07 00:00:00|2020-03-09 08:37:00|DI.MRI.S_ITS|[facility]|Imaging|[id number]|"PROCEDURE:  MR LUMBAR SPINE WO CON
Pt evaluated with improvement in ED, xray lungs clear without infiltrates noted - labs without acute abnormalities - otherwise will dc with PMD follow up. Well appearing and nontoxic otherwise.

## 2024-05-09 NOTE — ED PROVIDER NOTE - OBJECTIVE STATEMENT
84 year old female with PMH of Pulmonary fibrosis, prediabetes, recurrent UTI otherwise presenting to ED due to URI symptoms, fever, cough noted for past 2-3 days with body aches but no chills. Daughter states pt was more tired than usual this AM so brought her in for evaluation.

## 2024-05-09 NOTE — ED ADULT NURSE NOTE - CHPI ED NUR SYMPTOMS POS
coughing with difficulty breathing and body aches since yesterday. Pain in chest 6/10 only with coughing.  HX pre dm, htn, Alzheimer, pulmonary fibrosis, high cholesterol./COUGH

## 2024-05-09 NOTE — ED ADULT NURSE NOTE - COUGH
coughing with difficulty breathing and body aches since yesterday. Pain in chest 6/10 only with coughing.  HX pre dm, htn, Alzheimer, pulmonary fibrosis, high cholesterol./barking/dry unproductive

## 2024-05-09 NOTE — ED ADULT TRIAGE NOTE - CHIEF COMPLAINT QUOTE
coughing with difficulty breathing and body aches since yesterday. Pain in chest 6/10 only with coughing.  HX pre dm, htn, Alzheimer, pulmonary fibrosis, high cholesterol.

## 2024-05-09 NOTE — ED ADULT NURSE NOTE - PAIN: PRESENCE, MLM
coughing with difficulty breathing and body aches since yesterday. Pain in chest 6/10 only with coughing.  HX pre dm, htn, Alzheimer, pulmonary fibrosis, high cholesterol./complains of pain/discomfort

## 2024-05-09 NOTE — ED PROVIDER NOTE - PATIENT PORTAL LINK FT
You can access the FollowMyHealth Patient Portal offered by [facility] by registering at the following website: [email or web address]. By joining [employer]’s FollowMyHealth portal, you will also be able to view your health information using other applications (apps) compatible with our system.

## 2024-09-25 ENCOUNTER — APPOINTMENT (OUTPATIENT)
Dept: ORTHOPEDIC SURGERY | Facility: CLINIC | Age: 84
End: 2024-09-25
Payer: MEDICARE

## 2024-09-25 DIAGNOSIS — S22.080A WEDGE COMPRESSION FRACTURE OF T11-T12 VERTEBRA, INITIAL ENCOUNTER FOR CLOSED FRACTURE: ICD-10-CM

## 2024-09-25 DIAGNOSIS — Z00.00 ENCOUNTER FOR GENERAL ADULT MEDICAL EXAMINATION W/OUT ABNORMAL FINDINGS: ICD-10-CM

## 2024-09-25 PROCEDURE — 72170 X-RAY EXAM OF PELVIS: CPT

## 2024-09-25 PROCEDURE — 99213 OFFICE O/P EST LOW 20 MIN: CPT

## 2024-09-25 PROCEDURE — 72110 X-RAY EXAM L-2 SPINE 4/>VWS: CPT

## 2024-09-28 ENCOUNTER — APPOINTMENT (OUTPATIENT)
Dept: MRI IMAGING | Facility: CLINIC | Age: 84
End: 2024-09-28

## 2024-09-28 PROCEDURE — 72146 MRI CHEST SPINE W/O DYE: CPT

## 2024-10-09 ENCOUNTER — APPOINTMENT (OUTPATIENT)
Dept: ORTHOPEDIC SURGERY | Facility: CLINIC | Age: 84
End: 2024-10-09
Payer: MEDICARE

## 2024-10-09 DIAGNOSIS — S22.081A STABLE BURST FRACTURE OF T11-T12 VERTEBRA, INITIAL ENCOUNTER FOR CLOSED FRACTURE: ICD-10-CM

## 2024-10-09 PROCEDURE — 99215 OFFICE O/P EST HI 40 MIN: CPT

## 2024-10-16 ENCOUNTER — INPATIENT (INPATIENT)
Facility: HOSPITAL | Age: 84
LOS: 5 days | Discharge: HOME | End: 2024-10-22
Attending: STUDENT IN AN ORGANIZED HEALTH CARE EDUCATION/TRAINING PROGRAM | Admitting: STUDENT IN AN ORGANIZED HEALTH CARE EDUCATION/TRAINING PROGRAM
Payer: MEDICARE

## 2024-10-16 VITALS
DIASTOLIC BLOOD PRESSURE: 68 MMHG | OXYGEN SATURATION: 100 % | HEIGHT: 62 IN | WEIGHT: 175.05 LBS | SYSTOLIC BLOOD PRESSURE: 143 MMHG | RESPIRATION RATE: 24 BRPM | HEART RATE: 128 BPM | TEMPERATURE: 102 F

## 2024-10-16 LAB
ALBUMIN SERPL ELPH-MCNC: 2.8 G/DL — LOW (ref 3.3–5)
ALP SERPL-CCNC: 231 U/L — HIGH (ref 40–120)
ALT FLD-CCNC: 47 U/L — SIGNIFICANT CHANGE UP (ref 12–78)
ANION GAP SERPL CALC-SCNC: 10 MMOL/L — SIGNIFICANT CHANGE UP (ref 5–17)
APTT BLD: 26.3 SEC — SIGNIFICANT CHANGE UP (ref 24.5–35.6)
AST SERPL-CCNC: 68 U/L — HIGH (ref 15–37)
BASE EXCESS BLDV CALC-SCNC: 5 MMOL/L — HIGH (ref -2–3)
BASOPHILS # BLD AUTO: 0.04 K/UL — SIGNIFICANT CHANGE UP (ref 0–0.2)
BASOPHILS NFR BLD AUTO: 0.2 % — SIGNIFICANT CHANGE UP (ref 0–2)
BILIRUB SERPL-MCNC: 0.8 MG/DL — SIGNIFICANT CHANGE UP (ref 0.2–1.2)
BLOOD GAS COMMENTS, VENOUS: SIGNIFICANT CHANGE UP
BUN SERPL-MCNC: 36 MG/DL — HIGH (ref 7–23)
CALCIUM SERPL-MCNC: 11 MG/DL — HIGH (ref 8.5–10.1)
CHLORIDE BLDV-SCNC: 100 MMOL/L — SIGNIFICANT CHANGE UP (ref 98–107)
CHLORIDE SERPL-SCNC: 100 MMOL/L — SIGNIFICANT CHANGE UP (ref 96–108)
CO2 BLDV-SCNC: 32 MMOL/L — HIGH (ref 22–26)
CO2 SERPL-SCNC: 25 MMOL/L — SIGNIFICANT CHANGE UP (ref 22–31)
CREAT SERPL-MCNC: 1.78 MG/DL — HIGH (ref 0.5–1.3)
EGFR: 28 ML/MIN/1.73M2 — LOW
EOSINOPHIL # BLD AUTO: 0.05 K/UL — SIGNIFICANT CHANGE UP (ref 0–0.5)
EOSINOPHIL NFR BLD AUTO: 0.2 % — SIGNIFICANT CHANGE UP (ref 0–6)
FLUAV AG NPH QL: SIGNIFICANT CHANGE UP
FLUBV AG NPH QL: SIGNIFICANT CHANGE UP
GAS PNL BLDV: 132 MMOL/L — LOW (ref 136–145)
GAS PNL BLDV: SIGNIFICANT CHANGE UP
GAS PNL BLDV: SIGNIFICANT CHANGE UP
GLUCOSE BLDV-MCNC: 138 MG/DL — HIGH (ref 65–95)
GLUCOSE SERPL-MCNC: 136 MG/DL — HIGH (ref 70–99)
HCO3 BLDV-SCNC: 30 MMOL/L — HIGH (ref 22–28)
HCT VFR BLD CALC: 38.3 % — SIGNIFICANT CHANGE UP (ref 34.5–45)
HCT VFR BLDA CALC: 42 % — SIGNIFICANT CHANGE UP (ref 37–47)
HGB BLD CALC-MCNC: 13.9 G/DL — SIGNIFICANT CHANGE UP (ref 11.7–16.1)
HGB BLD-MCNC: 12.5 G/DL — SIGNIFICANT CHANGE UP (ref 11.5–15.5)
HOROWITZ INDEX BLDV+IHG-RTO: 21 — SIGNIFICANT CHANGE UP
IMM GRANULOCYTES NFR BLD AUTO: 0.9 % — SIGNIFICANT CHANGE UP (ref 0–0.9)
INR BLD: 1.21 RATIO — HIGH (ref 0.85–1.16)
LACTATE BLDV-MCNC: 2.4 MMOL/L — HIGH (ref 0.56–1.39)
LACTATE SERPL-SCNC: 2.1 MMOL/L — HIGH (ref 0.7–2)
LYMPHOCYTES # BLD AUTO: 0.33 K/UL — LOW (ref 1–3.3)
LYMPHOCYTES # BLD AUTO: 1.6 % — LOW (ref 13–44)
MCHC RBC-ENTMCNC: 28.4 PG — SIGNIFICANT CHANGE UP (ref 27–34)
MCHC RBC-ENTMCNC: 32.6 G/DL — SIGNIFICANT CHANGE UP (ref 32–36)
MCV RBC AUTO: 87 FL — SIGNIFICANT CHANGE UP (ref 80–100)
MONOCYTES # BLD AUTO: 0.69 K/UL — SIGNIFICANT CHANGE UP (ref 0–0.9)
MONOCYTES NFR BLD AUTO: 3.4 % — SIGNIFICANT CHANGE UP (ref 2–14)
NEUTROPHILS # BLD AUTO: 19.08 K/UL — HIGH (ref 1.8–7.4)
NEUTROPHILS NFR BLD AUTO: 93.7 % — HIGH (ref 43–77)
NRBC # BLD: 0 /100 WBCS — SIGNIFICANT CHANGE UP (ref 0–0)
PCO2 BLDV: 47 MMHG — SIGNIFICANT CHANGE UP (ref 42–55)
PH BLDV: 7.42 — SIGNIFICANT CHANGE UP (ref 7.32–7.43)
PLATELET # BLD AUTO: 191 K/UL — SIGNIFICANT CHANGE UP (ref 150–400)
PO2 BLDV: 29 MMHG — SIGNIFICANT CHANGE UP (ref 25–45)
POTASSIUM BLDV-SCNC: 5.6 MMOL/L — HIGH (ref 3.5–5.1)
POTASSIUM SERPL-MCNC: 4.1 MMOL/L — SIGNIFICANT CHANGE UP (ref 3.5–5.3)
POTASSIUM SERPL-SCNC: 4.1 MMOL/L — SIGNIFICANT CHANGE UP (ref 3.5–5.3)
PROT SERPL-MCNC: 7.4 GM/DL — SIGNIFICANT CHANGE UP (ref 6–8.3)
PROTHROM AB SERPL-ACNC: 13.7 SEC — HIGH (ref 9.9–13.4)
RBC # BLD: 4.4 M/UL — SIGNIFICANT CHANGE UP (ref 3.8–5.2)
RBC # FLD: 15.4 % — HIGH (ref 10.3–14.5)
SAO2 % BLDV: 43.4 % — LOW (ref 94–98)
SARS-COV-2 RNA SPEC QL NAA+PROBE: SIGNIFICANT CHANGE UP
SODIUM SERPL-SCNC: 135 MMOL/L — SIGNIFICANT CHANGE UP (ref 135–145)
TROPONIN I, HIGH SENSITIVITY RESULT: 299.5 NG/L — HIGH
WBC # BLD: 20.38 K/UL — HIGH (ref 3.8–10.5)
WBC # FLD AUTO: 20.38 K/UL — HIGH (ref 3.8–10.5)

## 2024-10-16 PROCEDURE — 71250 CT THORAX DX C-: CPT | Mod: 26,MC

## 2024-10-16 PROCEDURE — 93010 ELECTROCARDIOGRAM REPORT: CPT

## 2024-10-16 PROCEDURE — 71045 X-RAY EXAM CHEST 1 VIEW: CPT | Mod: 26

## 2024-10-16 PROCEDURE — 74176 CT ABD & PELVIS W/O CONTRAST: CPT | Mod: 26,MC

## 2024-10-16 PROCEDURE — 99285 EMERGENCY DEPT VISIT HI MDM: CPT

## 2024-10-16 PROCEDURE — 72128 CT CHEST SPINE W/O DYE: CPT | Mod: 26,MC

## 2024-10-16 PROCEDURE — 72131 CT LUMBAR SPINE W/O DYE: CPT | Mod: 26,MC

## 2024-10-16 RX ORDER — CEFTRIAXONE SODIUM 10 G
1000 VIAL (EA) INJECTION ONCE
Refills: 0 | Status: COMPLETED | OUTPATIENT
Start: 2024-10-16 | End: 2024-10-16

## 2024-10-16 RX ORDER — SODIUM CHLORIDE 9 MG/ML
1550 INJECTION, SOLUTION INTRAMUSCULAR; INTRAVENOUS; SUBCUTANEOUS ONCE
Refills: 0 | Status: COMPLETED | OUTPATIENT
Start: 2024-10-16 | End: 2024-10-16

## 2024-10-16 RX ORDER — ACETAMINOPHEN 500 MG
1000 TABLET ORAL ONCE
Refills: 0 | Status: COMPLETED | OUTPATIENT
Start: 2024-10-16 | End: 2024-10-16

## 2024-10-16 RX ADMIN — Medication 400 MILLIGRAM(S): at 20:27

## 2024-10-16 RX ADMIN — Medication 1000 MILLIGRAM(S): at 21:19

## 2024-10-16 RX ADMIN — Medication 100 MILLIGRAM(S): at 20:49

## 2024-10-16 RX ADMIN — Medication 1000 MILLIGRAM(S): at 20:42

## 2024-10-16 RX ADMIN — SODIUM CHLORIDE 1550 MILLILITER(S): 9 INJECTION, SOLUTION INTRAMUSCULAR; INTRAVENOUS; SUBCUTANEOUS at 20:27

## 2024-10-16 NOTE — ED ADULT TRIAGE NOTE - CHIEF COMPLAINT QUOTE
as per EMS " coming from home, today family noticed she wasn't responded right, and confused, , tachycardiac, hyperthermic."

## 2024-10-16 NOTE — ED ADULT NURSE NOTE - TEMPLATE LIST FOR HEAD TO TOE ASSESSMENT
Replying to Campaign Questionnaire for [facility]: DM Eye Exam    AL sent to Dr. Delonte Gunderson 1x to request dm eye exam. Reminder set.   Cardiac

## 2024-10-16 NOTE — ED PROVIDER NOTE - OBJECTIVE STATEMENT
84F French speaking PMH HTN, HLD, pre-DM, CAD s/p PCI, hx frequent UTIs / renal stones, dementia BIBEMS accompanied by daughter d/t unresponsive episode this evening. Per daughter, pt went for routine Neuro appt this AM. Pt requesting to sleep afterwards, daughter thought d/t up early for appt. Pt sleep remainder of day, unusual for pt. Pt ate meal 4/5P. Family went to check on pt this evening, pt w/ dentures out, seemed unable to speak. Pt endorses generalized weakness currently. Per daughter, pt w/ hx similar episodes in past w/ fever. Pt febrile to 102F on ED arrival. Pt complained of upper abd discomfort earlier today, but since resolved. Pt denies h/a, dizziness, CP, SOB, cough, flank pain, N/V/D/C, foul-smelling urine. Denies recent travel or known sick contacts. Pt w/ hx T12 fx s/p fall 1mo ago, daughter giving Motrin / Tylenol PRN pain control. Pt ambulatory w/ assistance, uses wheelchair for distances. Per daughter, pt back w/ redness "like a burn" - concern for infection.     PMH as above, PSH PCI, renal stone, NKDA, Meds as listed (not on AC).

## 2024-10-16 NOTE — ED ADULT NURSE REASSESSMENT NOTE - NS ED NURSE REASSESS COMMENT FT1
Dr. Gunter informed pt receiving fluids. As per Dr. Gunter, pt to have rpt trop and rpt lactate completed after fluids completed.

## 2024-10-16 NOTE — ED ADULT NURSE NOTE - ED STAT RN HANDOFF DETAILS
Report endorsed to RN Jarad. Safety checks completed this shift. Safety rounds completed hourly.  IV sites checked Q2+remains WDL. Medications administered as ordered with no signs/symptoms of adverse reactions. Fall & skin precautions in place. Any issues endorsed to RN Jarad for follow up. pt pending rpt lactate and trop.

## 2024-10-16 NOTE — ED ADULT NURSE NOTE - OBJECTIVE STATEMENT
84 y.o female, biba, A&Ox4, c/o shortness of breath. As per daughter, she took her mother to the neurologist today and everything was fine. mother states pt went to sleep post appointment and when her  went to wake her up, pt was difficult to arose. when pt awoke, pt was confused. pt states "I want to sleep a little bit". upon ems arrival pt noted to be tachycardiac and spo2 95% RA, pt received from meghana yip. pt on cardiac monitor. pt noted to be febrile in triage. pt placed on cardiac monitor. no acute distress noted.

## 2024-10-16 NOTE — ED PROVIDER NOTE - CLINICAL SUMMARY MEDICAL DECISION MAKING FREE TEXT BOX
84F Croatian speaking PMH HTN, HLD, pre-DM, CAD s/p PCI, hx frequent UTIs / renal stones, dementia BIBEMS accompanied by daughter d/t unresponsive episode this evening. + febrile, tachycardic. Plan for Code Sepsis, anticipate admission.   W/u significant for: leukocytosis to 20, Cr 1.78 (baseline 1-1.5), lactate 2.1, trop 300. 84F Frisian speaking PMH HTN, HLD, pre-DM, CAD s/p PCI, hx frequent UTIs / renal stones, dementia BIBEMS accompanied by daughter d/t unresponsive episode this evening. + febrile, tachycardic. Plan for Code Sepsis, anticipate admission.   W/u significant for: leukocytosis to 20, Cr 1.78 (baseline 1-1.5), lactate 2.1, trop 300. Pt care signed out to incoming team at change of shift, pending urine and CT results.

## 2024-10-16 NOTE — ED PROVIDER NOTE - PROGRESS NOTE DETAILS
MD Kira: Patient received on sign-out awaiting CT read. CT read w/o any obvious infectious pathology. patient elderly female w/ hx of CAD, dementia, pacemaker presenting for ams, episode of unresponsiveness. concern for sepsis. UA sent will admit

## 2024-10-16 NOTE — ED ADULT TRIAGE NOTE - TEMPERATURE IN FAHRENHEIT (DEGREES F)
RISHABH HUNT ; 10/12/2021 ; [facility] Med [address]  RISHABH HUNT ; 12/20/2021 ; [facility] Cardio Electro [phone]

## 2024-10-16 NOTE — ED ADULT TRIAGE NOTE - PATIENT'S SEXUAL ORIENTATION
Heterosexual Hospitalist Progress Note Samaria Chinchilla MD 
Answering service: [phone] from in house phone Cell: [phone] Date of Service:  2018 NAME:  Josemanuel Cage :  1958 MRN:  [id number] Admission Summary:  
The patient is a 59-year-old gentleman with past medical history of alcoholic cirrhosis, history of ascites with regular paracentesis, history of cardiomyopathy, history of AICD placement, alcohol abuse in remission, hepatic encephalopathy, history of LORENZA, hyponatremia, anemia, thrombocytopenia, esophageal varices, chronic systolic heart failure stage II, status post left BKA, hypertension, diabetes mellitus type 2 who presents to the hospital with abnormal labs. Patient was recently seen by  hepatologist, was found to have ascites, was sent for paracentesis yesterday, had fluid analysis done and was found to have SBP and was sent to the hospital for further management and evaluation. Patient reports that he does not have any fever, chills, abdominal pain associated with his symptoms. The patient reports that he is not confused per se. Patient also denies any other complaints or problems. The patient reports that his paracentesis was 3 days ago. Patient reports that he had some difficulty sleeping last night, had some nausea and some anxiety last night. Patient reports that he has gained 6 pounds over the last three days. Interval history / Subjective:  
  F/u SBP No pain, nausea or vomiting Assessment & Plan: SBP 
-On cefotetan, continue for 5 days () and then switch to oral per hepatology 
-Appreciate Hepatology 
-Discharge tomorrow on oral antibiotics Ascites with alcoholic liver cirrhosis -s/p paracentesis with 6-7 l taken off  
-getting paracentesis every 2 weeks 
-per Dr Saleem Martin, the patient not a candidate for TIPS because of repeated HE 
-not a candidate for liver transplant sec to cardiomyopathy -?paracentesis, spoke to Dr Malgorzata Carr, he agreed on getting it today 
-Dr Malgorzata Carr also said that the patient should be on Lasix/Aldactone Cardiomyopathy, alcoholic 
-s/p ICD 
-Seems compensated 
-not sure if continuing digoxin in a patient with CKD, cirrhosis would be appropriate 
-Outpatient follow up with Dr Carlos Henry Hyponatremia 
-sec to above Chronic anemia 
-sec to above CKD stage III 
-stable Cardiac diet Code status: The patient wants to be DNR 
DVT prophylaxis: scd Plan: Paracentesis today and discharge tomorrow on oral antibiotics Care Plan discussed with: Patient/Family Disposition: Home w/Family Hospital Problems  Date Reviewed: 7/16/2018 Codes Class Noted POA * (Principal)SBP (spontaneous bacterial peritonitis) ([facility] ICD-10-CM: T79.4 ICD-9-CM: 642.66  7/16/2018 Unknown Review of Systems: A comprehensive review of systems was negative except for that written in the HPI. Vital Signs:  
 Last 24hrs VS reviewed since prior progress note. Most recent are: 
Visit Vitals  /64 (BP 1 Location: Left arm, BP Patient Position: Head of bed elevated (Comment degrees))  Pulse 64  Temp 98.4 °F (36.9 °C)  Resp 16  
 Ht 5' 10\" (1.778 m)  Wt 91.9 kg (202 lb 11.2 oz)  SpO2 94%  BMI 29.08 kg/m2 No intake or output data in the 24 hours ending 07/19/18 1009 Physical Examination:  
 
 
     
Constitutional:  No acute distress, cooperative, pleasant   
ENT:  Oral mucous moist, oropharynx benign. Neck supple, Resp:  CTA bilaterally. No wheezing/rhonchi/rales. No accessory muscle use CV:  Regular rhythm, normal rate, no murmurs, gallops, rubs GI:  Soft, non distended, non tender. normoactive bowel sounds, no hepatosplenomegaly Musculoskeletal:  No edema, warm, 2+ pulses throughout Neurologic:  Moves all extremities. AAOx3, CN II-XII reviewed Skin:  Good turgor, no rashes or ulcers Data Review:  
 Review and/or order of clinical lab test 
 
 
Labs:  
 
Recent Labs  
   07/19/18 
 0502  07/18/18 0447 WBC  5.1  4.0* HGB  8.1*  8.0*  
HCT  22.7*  22.4* PLT  64*  55* Recent Labs  
   07/19/18 
 0502  07/18/18 0447  07/17/18 
 5842 NA  126*  129*  128* K  4.9  5.2*  4.8  
CL  96*  100  99 CO2  20*  22  21 BUN  20  27*  30* CREA  1.58*  1.70*  1.74* GLU  228*  186*  238* CA  8.5  8.8  8.7 Recent Labs  
   07/19/18 
 0502  07/18/18 0447 07/17/18 
 7194 SGOT  41*  36  43* ALT  30  30  29 AP  161*  141*  149* TBILI  2.1*  2.2*  2.4*  
TP  6.3*  6.3*  6.1* ALB  2.8*  3.1*  2.7*  
GLOB  3.5  3.2  3.4 No results for input(s): INR, PTP, APTT in the last 72 hours. No lab exists for component: INREXT, INREXT No results for input(s): FE, TIBC, PSAT, FERR in the last 72 hours. Lab Results Component Value Date/Time Folate 19.9 12/07/2017 04:25 AM  
  
No results for input(s): PH, PCO2, PO2 in the last 72 hours. No results for input(s): CPK, CKNDX, TROIQ in the last 72 hours. No lab exists for component: CPKMB No results found for: CHOL, CHOLX, CHLST, CHOLV, HDL, LDL, LDLC, DLDLP, TGLX, TRIGL, TRIGP, CHHD, CHHDX Lab Results Component Value Date/Time Glucose (POC) 271 (H) 07/19/2018 06:12 AM  
 Glucose (POC) 262 (H) 07/18/2018 11:05 PM  
 Glucose (POC) 289 (H) 07/18/2018 06:29 PM  
 Glucose (POC) 294 (H) 07/18/2018 11:45 AM  
 Glucose (POC) 205 (H) 07/18/2018 06:30 AM  
 
Lab Results Component Value Date/Time  Color YELLOW/STRAW 04/29/2018 03:34 PM  
 Appearance CLEAR 04/29/2018 03:34 PM  
 Specific gravity 1.010 04/29/2018 03:34 PM  
 Specific gravity 1.015 04/13/2018 10:10 AM  
 pH (UA) 6.0 04/29/2018 03:34 PM  
 Protein NEGATIVE  04/29/2018 03:34 PM  
 Glucose NEGATIVE  04/29/2018 03:34 PM  
 Ketone NEGATIVE  04/29/2018 03:34 PM  
 Bilirubin NEGATIVE  04/29/2018 03:34 PM  
 Urobilinogen 0.2 04/29/2018 03:34 PM  
 Nitrites NEGATIVE 04/29/2018 03:34 PM  
 Leukocyte Esterase NEGATIVE  04/29/2018 03:34 PM  
 Epithelial cells FEW 04/29/2018 03:34 PM  
 Bacteria NEGATIVE  04/29/2018 03:34 PM  
 WBC 0-4 04/29/2018 03:34 PM  
 RBC 0-5 04/29/2018 03:34 PM  
 
 
 
Medications Reviewed:  
 
Current Facility-Administered Medications Medication Dose Route Frequency  insulin lispro (HUMALOG) injection   SubCUTAneous AC&HS  
 glucose chewable tablet 16 g  4 Tab Oral PRN  
 dextrose (D50W) injection syrg 12.5-25 g  12.5-25 g IntraVENous PRN  
 glucagon (GLUCAGEN) injection 1 mg  1 mg IntraMUSCular PRN  
 insulin glargine (LANTUS) injection 10 Units  10 Units SubCUTAneous QHS  furosemide (LASIX) tablet 40 mg  40 mg Oral DAILY  spironolactone (ALDACTONE) tablet 100 mg  100 mg Oral DAILY  amiodarone (CORDARONE) tablet 200 mg  200 mg Oral QHS  digoxin (LANOXIN) tablet 0.125 mg  0.125 mg Oral QHS  gabapentin (NEURONTIN) capsule 100 mg  100 mg Oral TID  multivitamin, tx-iron-ca-min (THERA-M w/ IRON) tablet 1 Tab  1 Tab Oral DAILY  rifAXIMin (XIFAXAN) tablet 550 mg  550 mg Oral BID  thiamine HCL (B-1) tablet 100 mg  100 mg Oral DAILY  sodium chloride (NS) flush 5-10 mL  5-10 mL IntraVENous Q8H  
 sodium chloride (NS) flush 5-10 mL  5-10 mL IntraVENous PRN  
 ondansetron (ZOFRAN) injection 4 mg  4 mg IntraVENous Q4H PRN  pantoprazole (PROTONIX) 40 mg in sodium chloride 0.9% 10 mL injection  40 mg IntraVENous Q24H  cefoTEtan (CEFOTAN) 2 g in 0.9% sodium chloride (MBP/ADV) 50 mL  2 g IntraVENous Q12H  
 lactulose (CHRONULAC) solution 20 g  20 g Oral TID  
 HYDROcodone-acetaminophen (NORCO) 5-325 mg per tablet 1 Tab  1 Tab Oral Q4H PRN  
 
______________________________________________________________________ EXPECTED LENGTH OF STAY: 4d 2h 
ACTUAL LENGTH OF STAY:          3 Dang Abel MD

## 2024-10-17 DIAGNOSIS — I10 ESSENTIAL (PRIMARY) HYPERTENSION: ICD-10-CM

## 2024-10-17 DIAGNOSIS — R79.89 OTHER SPECIFIED ABNORMAL FINDINGS OF BLOOD CHEMISTRY: ICD-10-CM

## 2024-10-17 DIAGNOSIS — N17.9 ACUTE KIDNEY FAILURE, UNSPECIFIED: ICD-10-CM

## 2024-10-17 DIAGNOSIS — N39.0 URINARY TRACT INFECTION, SITE NOT SPECIFIED: ICD-10-CM

## 2024-10-17 DIAGNOSIS — R41.82 ALTERED MENTAL STATUS, UNSPECIFIED: ICD-10-CM

## 2024-10-17 LAB
ALBUMIN SERPL ELPH-MCNC: 2.4 G/DL — LOW (ref 3.3–5)
ALP SERPL-CCNC: 181 U/L — HIGH (ref 40–120)
ALT FLD-CCNC: 41 U/L — SIGNIFICANT CHANGE UP (ref 12–78)
ANION GAP SERPL CALC-SCNC: 10 MMOL/L — SIGNIFICANT CHANGE UP (ref 5–17)
APPEARANCE UR: ABNORMAL
AST SERPL-CCNC: 81 U/L — HIGH (ref 15–37)
BACTERIA # UR AUTO: ABNORMAL /HPF
BILIRUB SERPL-MCNC: 0.6 MG/DL — SIGNIFICANT CHANGE UP (ref 0.2–1.2)
BILIRUB UR-MCNC: NEGATIVE — SIGNIFICANT CHANGE UP
BUN SERPL-MCNC: 35 MG/DL — HIGH (ref 7–23)
CALCIUM SERPL-MCNC: 10.3 MG/DL — HIGH (ref 8.5–10.1)
CHLORIDE SERPL-SCNC: 108 MMOL/L — SIGNIFICANT CHANGE UP (ref 96–108)
CO2 SERPL-SCNC: 24 MMOL/L — SIGNIFICANT CHANGE UP (ref 22–31)
COLOR SPEC: SIGNIFICANT CHANGE UP
CREAT SERPL-MCNC: 1.49 MG/DL — HIGH (ref 0.5–1.3)
DIFF PNL FLD: ABNORMAL
E COLI DNA BLD POS QL NAA+NON-PROBE: SIGNIFICANT CHANGE UP
EGFR: 34 ML/MIN/1.73M2 — LOW
EPI CELLS # UR: PRESENT
GLUCOSE SERPL-MCNC: 108 MG/DL — HIGH (ref 70–99)
GLUCOSE UR QL: NEGATIVE MG/DL — SIGNIFICANT CHANGE UP
GRAM STN FLD: ABNORMAL
GRAM STN FLD: ABNORMAL
HCT VFR BLD CALC: 34.5 % — SIGNIFICANT CHANGE UP (ref 34.5–45)
HGB BLD-MCNC: 11 G/DL — LOW (ref 11.5–15.5)
KETONES UR-MCNC: ABNORMAL MG/DL
LACTATE SERPL-SCNC: 1.6 MMOL/L — SIGNIFICANT CHANGE UP (ref 0.7–2)
LACTATE SERPL-SCNC: 1.9 MMOL/L — SIGNIFICANT CHANGE UP (ref 0.7–2)
LEUKOCYTE ESTERASE UR-ACNC: ABNORMAL
MAGNESIUM SERPL-MCNC: 2.1 MG/DL — SIGNIFICANT CHANGE UP (ref 1.6–2.6)
MCHC RBC-ENTMCNC: 28.1 PG — SIGNIFICANT CHANGE UP (ref 27–34)
MCHC RBC-ENTMCNC: 31.9 G/DL — LOW (ref 32–36)
MCV RBC AUTO: 88.2 FL — SIGNIFICANT CHANGE UP (ref 80–100)
METHOD TYPE: SIGNIFICANT CHANGE UP
NITRITE UR-MCNC: POSITIVE
NRBC # BLD: 0 /100 WBCS — SIGNIFICANT CHANGE UP (ref 0–0)
PH UR: 5.5 — SIGNIFICANT CHANGE UP (ref 5–8)
PHOSPHATE SERPL-MCNC: 1.7 MG/DL — LOW (ref 2.5–4.5)
PLATELET # BLD AUTO: 158 K/UL — SIGNIFICANT CHANGE UP (ref 150–400)
POTASSIUM SERPL-MCNC: 3.3 MMOL/L — LOW (ref 3.5–5.3)
POTASSIUM SERPL-SCNC: 3.3 MMOL/L — LOW (ref 3.5–5.3)
PROT SERPL-MCNC: 6 GM/DL — SIGNIFICANT CHANGE UP (ref 6–8.3)
PROT UR-MCNC: 100 MG/DL
RBC # BLD: 3.91 M/UL — SIGNIFICANT CHANGE UP (ref 3.8–5.2)
RBC # FLD: 15.6 % — HIGH (ref 10.3–14.5)
RBC CASTS # UR COMP ASSIST: 25 /HPF — HIGH (ref 0–4)
SODIUM SERPL-SCNC: 142 MMOL/L — SIGNIFICANT CHANGE UP (ref 135–145)
SP GR SPEC: 1.02 — SIGNIFICANT CHANGE UP (ref 1–1.03)
SPECIMEN SOURCE: SIGNIFICANT CHANGE UP
SPECIMEN SOURCE: SIGNIFICANT CHANGE UP
TROPONIN I, HIGH SENSITIVITY RESULT: 235.2 NG/L — HIGH
TROPONIN I, HIGH SENSITIVITY RESULT: 305.6 NG/L — HIGH
UROBILINOGEN FLD QL: 1 MG/DL — SIGNIFICANT CHANGE UP (ref 0.2–1)
WBC # BLD: 23.3 K/UL — HIGH (ref 3.8–10.5)
WBC # FLD AUTO: 23.3 K/UL — HIGH (ref 3.8–10.5)
WBC UR QL: >50 /HPF — HIGH (ref 0–5)

## 2024-10-17 PROCEDURE — 99223 1ST HOSP IP/OBS HIGH 75: CPT

## 2024-10-17 PROCEDURE — 71045 X-RAY EXAM CHEST 1 VIEW: CPT | Mod: 26

## 2024-10-17 RX ORDER — SODIUM CHLORIDE 9 MG/ML
500 INJECTION, SOLUTION INTRAMUSCULAR; INTRAVENOUS; SUBCUTANEOUS ONCE
Refills: 0 | Status: COMPLETED | OUTPATIENT
Start: 2024-10-17 | End: 2024-10-17

## 2024-10-17 RX ORDER — MELATONIN 5 MG
3 TABLET ORAL AT BEDTIME
Refills: 0 | Status: DISCONTINUED | OUTPATIENT
Start: 2024-10-17 | End: 2024-10-22

## 2024-10-17 RX ORDER — ONDANSETRON HYDROCHLORIDE 2 MG/ML
4 INJECTION, SOLUTION INTRAMUSCULAR; INTRAVENOUS EVERY 8 HOURS
Refills: 0 | Status: DISCONTINUED | OUTPATIENT
Start: 2024-10-17 | End: 2024-10-22

## 2024-10-17 RX ORDER — NORTRIPTYLINE HCL 10 MG
1 CAPSULE ORAL
Refills: 0 | DISCHARGE

## 2024-10-17 RX ORDER — NYSTATIN 100000 U/G
1 POWDER TOPICAL EVERY 12 HOURS
Refills: 0 | Status: DISCONTINUED | OUTPATIENT
Start: 2024-10-17 | End: 2024-10-22

## 2024-10-17 RX ORDER — ACETAMINOPHEN 500 MG
650 TABLET ORAL EVERY 6 HOURS
Refills: 0 | Status: DISCONTINUED | OUTPATIENT
Start: 2024-10-17 | End: 2024-10-22

## 2024-10-17 RX ORDER — FUROSEMIDE 40 MG
20 TABLET ORAL ONCE
Refills: 0 | Status: COMPLETED | OUTPATIENT
Start: 2024-10-17 | End: 2024-10-17

## 2024-10-17 RX ORDER — NYSTATIN/TRIAMCINOLONE ACET
1 OINTMENT (GRAM) TOPICAL EVERY 12 HOURS
Refills: 0 | Status: DISCONTINUED | OUTPATIENT
Start: 2024-10-17 | End: 2024-10-17

## 2024-10-17 RX ORDER — TRIAMCINOLONE ACETONIDE/L.S.B. 0.5 %
1 CREAM (GRAM) TOPICAL EVERY 12 HOURS
Refills: 0 | Status: DISCONTINUED | OUTPATIENT
Start: 2024-10-17 | End: 2024-10-22

## 2024-10-17 RX ORDER — HEPARIN SODIUM 10000 [USP'U]/ML
5000 INJECTION INTRAVENOUS; SUBCUTANEOUS EVERY 8 HOURS
Refills: 0 | Status: DISCONTINUED | OUTPATIENT
Start: 2024-10-17 | End: 2024-10-22

## 2024-10-17 RX ORDER — IBUPROFEN 200 MG
600 TABLET ORAL ONCE
Refills: 0 | Status: DISCONTINUED | OUTPATIENT
Start: 2024-10-17 | End: 2024-10-17

## 2024-10-17 RX ORDER — CEFTRIAXONE SODIUM 10 G
1000 VIAL (EA) INJECTION EVERY 24 HOURS
Refills: 0 | Status: DISCONTINUED | OUTPATIENT
Start: 2024-10-17 | End: 2024-10-18

## 2024-10-17 RX ORDER — POTASSIUM PHOSPHATE 236; 224 MG/ML; MG/ML
30 INJECTION, SOLUTION INTRAVENOUS ONCE
Refills: 0 | Status: COMPLETED | OUTPATIENT
Start: 2024-10-17 | End: 2024-10-17

## 2024-10-17 RX ORDER — SODIUM CHLORIDE 9 MG/ML
1000 INJECTION, SOLUTION INTRAMUSCULAR; INTRAVENOUS; SUBCUTANEOUS
Refills: 0 | Status: DISCONTINUED | OUTPATIENT
Start: 2024-10-17 | End: 2024-10-17

## 2024-10-17 RX ORDER — MAGNESIUM, ALUMINUM HYDROXIDE 200-200 MG
30 TABLET,CHEWABLE ORAL EVERY 4 HOURS
Refills: 0 | Status: DISCONTINUED | OUTPATIENT
Start: 2024-10-17 | End: 2024-10-22

## 2024-10-17 RX ORDER — MEMANTINE HYDROCHLORIDE 21 MG/1
1 CAPSULE, EXTENDED RELEASE ORAL
Refills: 0 | DISCHARGE

## 2024-10-17 RX ORDER — ACETAMINOPHEN 500 MG
1000 TABLET ORAL ONCE
Refills: 0 | Status: COMPLETED | OUTPATIENT
Start: 2024-10-17 | End: 2024-10-17

## 2024-10-17 RX ADMIN — HEPARIN SODIUM 5000 UNIT(S): 10000 INJECTION INTRAVENOUS; SUBCUTANEOUS at 21:58

## 2024-10-17 RX ADMIN — Medication 400 MILLIGRAM(S): at 05:37

## 2024-10-17 RX ADMIN — Medication 100 MILLIGRAM(S): at 18:27

## 2024-10-17 RX ADMIN — SODIUM CHLORIDE 80 MILLILITER(S): 9 INJECTION, SOLUTION INTRAMUSCULAR; INTRAVENOUS; SUBCUTANEOUS at 04:52

## 2024-10-17 RX ADMIN — Medication 20 MILLIGRAM(S): at 18:27

## 2024-10-17 RX ADMIN — POTASSIUM PHOSPHATE 83.33 MILLIMOLE(S): 236; 224 INJECTION, SOLUTION INTRAVENOUS at 10:34

## 2024-10-17 RX ADMIN — Medication 1 APPLICATION(S): at 17:53

## 2024-10-17 RX ADMIN — Medication 650 MILLIGRAM(S): at 19:49

## 2024-10-17 RX ADMIN — Medication 650 MILLIGRAM(S): at 18:49

## 2024-10-17 RX ADMIN — HEPARIN SODIUM 5000 UNIT(S): 10000 INJECTION INTRAVENOUS; SUBCUTANEOUS at 05:37

## 2024-10-17 RX ADMIN — HEPARIN SODIUM 5000 UNIT(S): 10000 INJECTION INTRAVENOUS; SUBCUTANEOUS at 16:44

## 2024-10-17 RX ADMIN — NYSTATIN 1 APPLICATION(S): 100000 POWDER TOPICAL at 17:34

## 2024-10-17 RX ADMIN — SODIUM CHLORIDE 1000 MILLILITER(S): 9 INJECTION, SOLUTION INTRAMUSCULAR; INTRAVENOUS; SUBCUTANEOUS at 06:49

## 2024-10-17 NOTE — H&P ADULT - NSHPPHYSICALEXAM_GEN_ALL_CORE
GENERAL: NAD, comfortable in bed   HEAD:  Atraumatic, Normocephalic  EYES: EOMI, PERRL, conjunctiva and sclera clear  NECK: Supple, No JVD  LUNG: Clear to auscultation bilaterally; No wheezes, rales or rhonchi  HEART: Regular rate and rhythm; No murmurs, rubs, or gallops  ABDOMEN: Soft, Nontender, Nondistended; Normal Bowel sounds   EXTREMITIES:  No clubbing, cyanosis, or edema  PSYCH: normal mood and affect  NEUROLOGY: AAOx3, non-focal GENERAL: NAD, comfortable in bed   HEAD:  Atraumatic, Normocephalic  EYES: EOMI, PERRL, conjunctiva and sclera clear  NECK: Supple, No JVD  LUNG: Clear to auscultation bilaterally; No wheezes, rales or rhonchi  HEART: Regular rate and rhythm; No murmurs, rubs, or gallops  ABDOMEN: Soft, Nontender, Nondistended; Normal Bowel sounds   EXTREMITIES:  No clubbing, cyanosis, or edema  PSYCH: normal mood and affect  NEUROLOGY: AAOx3, non-focal  Skin - Erythematous, tender area around the gluteal fold, into the vaginal area.

## 2024-10-17 NOTE — PROVIDER CONTACT NOTE (OTHER) - ACTION/TREATMENT ORDERED:
Acetaminophen IVPB 1000 mg was given. currently on sodium chloride 0.9% @ 80 ml/hr. No new order at this time

## 2024-10-17 NOTE — PATIENT PROFILE ADULT - NSPROEXTENSIONSOFSELF_GEN_A_NUR
S: The patient is here today for wound/scar check. The patient had Mohs surgery located on the Left lower lip with Second Intention Wound Healing repair, 1.5 week(s) ago. The patient c/o active bleeding for over an hour. EXAM: Local anesthesia was achieved with 1% lidocaine. Hemostasis was achieved using electrosurgery. The wound was bandaged. The wound was cleaned with normal saline solution, dried off, Aquaphor ointment was applied, and the wound was covered. A dressing was applied. IMPRESSION/PLAN:  There were no complications and the patient left in good medical condition. Reviewed post operative care with applying pressure and ice if bleeding occurs again. He has second Mohs site scheduled Wednesday and will see Dr. Akash Gilliland for a follow up of this wound.
dentures

## 2024-10-17 NOTE — PROVIDER CONTACT NOTE (CRITICAL VALUE NOTIFICATION) - SITUATION
Blood culture collected yesterday resulted:  Growth in aerobic bottle: Gram negative tl  Growth in anaerobic bottle: gram negative tl

## 2024-10-17 NOTE — H&P ADULT - HISTORY OF PRESENT ILLNESS
84 year old [place] speaking female with a PMH of HTN, HLP, Pre-DM, CAD s/p PCI, frequent UTI BIBEMS from home to be evaluated for AMS. As per daughter, patient went to take a nap s/p neurology appointment, they went to wake her up, she was difficult to arose and appeared confused. Upon evaluation, patient AAOx3, back to baseline as per daughter. Endorses she is feeling weak, otherwise no pain or discomfort. Denies chest pain, palpitation, SOB, headache, dizziness, abdominal pain, N/V/D, dysuria, increase urinary frequency/urgency or nay other acute symptoms. In the ED, patient met sepsis criteria with Leukocytosis – WBC: 20.38, Lactic acidosis – Lactate:2.1, Tachycardic: HR-123, Febrile: T-max:101.9, Source: UTI. Troponin 299.5-->305.6. ECG- No ST changes. Cr:1.78. Received 1.5L-NS. Ofirmev, Ceftriaxone.   CT-Abd: Air within the wall of the urinary bladder and extending into adjacent veins. 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

## 2024-10-17 NOTE — H&P ADULT - ASSESSMENT
84 year old [place] speaking female with a PMH of HTN, HLP, Pre-DM, CAD s/p PCI, frequent UTI BIBEMS from home to be evaluated for AMS. As per daughter, patient went to take a nap s/p neurology appointment, they went to wake her up, she was difficult to arose and appeared confused. Upon evaluation, patient AAOx3, back to baseline as per daughter. Endorses she is feeling weak, otherwise no pain or discomfort. Denies chest pain, palpitation, SOB, headache, dizziness, abdominal pain, N/V/D, dysuria, increase urinary frequency/urgency or nay other acute symptoms. In the ED, patient met sepsis criteria with Leukocytosis – WBC: 20.38, Lactic acidosis – Lactate:2.1, Tachycardic: HR-123, Febrile: T-max:101.9, Source: UTI. Troponin 299.5-->305.6. ECG- No ST changes. Cr:1.78. Received 1.5L-NS. Ofirmev, Ceftriaxone.

## 2024-10-17 NOTE — PATIENT PROFILE ADULT - FALL HARM RISK - HARM RISK INTERVENTIONS
Assistance with ambulation/Assistance OOB with selected safe patient handling equipment/Communicate Risk of Fall with Harm to all staff/Reinforce activity limits and safety measures with patient and family/Tailored Fall Risk Interventions/Visual Cue: Yellow wristband and red socks/Bed in lowest position, wheels locked, appropriate side rails in place/Call bell, personal items and telephone in reach/Instruct patient to call for assistance before getting out of bed or chair/Non-slip footwear when patient is out of bed/[place] to call system/Physically safe environment - no spills, clutter or unnecessary equipment/Purposeful Proactive Rounding/Room/bathroom lighting operational, light cord in reach Assistance with ambulation/Assistance OOB with selected safe patient handling equipment/Communicate Risk of Fall with Harm to all staff/Discuss with provider need for PT consult/Monitor gait and stability/Provide patient with walking aids - walker, cane, crutches/Reinforce activity limits and safety measures with patient and family/Tailored Fall Risk Interventions/Visual Cue: Yellow wristband and red socks/Bed in lowest position, wheels locked, appropriate side rails in place/Call bell, personal items and telephone in reach/Instruct patient to call for assistance before getting out of bed or chair/Non-slip footwear when patient is out of bed/[place] to call system/Physically safe environment - no spills, clutter or unnecessary equipment/Purposeful Proactive Rounding/Room/bathroom lighting operational, light cord in reach

## 2024-10-17 NOTE — PROVIDER CONTACT NOTE (CRITICAL VALUE NOTIFICATION) - TEST AND RESULT REPORTED:
troponin 299.5
Growth in aerobic bottle: Gram negative tl  Growth in anaerobic bottle: gram negative tl

## 2024-10-17 NOTE — H&P ADULT - PROBLEM SELECTOR PLAN 1
In the ED, patient met sepsis criteria with Leukocytosis – WBC: 20.38, Lactic acidosis – Lactate:2.1, Tachycardic: HR-123, Febrile: T-max:101.9, Source: UTI.  UA: (+) Nitrite, Moderate- Leuk esterase, WBC>50, Moderate Bacteria  CT-Abd: Air within the wall of the urinary bladder and extending into adjacent veins. 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   - Tele  - IV-NS  - Ceftriaxone   - Tylenol for fever   - F/U Culture   - DVT Prophylaxis

## 2024-10-17 NOTE — CHART NOTE - NSCHARTNOTEFT_GEN_A_CORE
Called to evaluate patient for +SOB.  Patient Italian speaking translation by family member at bedside, feeling increased SOB, +cough.  No chest pain, headache, dizziness.    HPI:  84 year old Surinamese speaking female with a PMH of HTN, HLP, Pre-DM, CAD s/p PCI, frequent UTI BIBEMS from home to be evaluated for AMS. As per daughter, patient went to take a nap s/p neurology appointment, they went to wake her up, she was difficult to arose and appeared confused. Upon evaluation, patient AAOx3, back to baseline as per daughter. Endorses she is feeling weak, otherwise no pain or discomfort. Denies chest pain, palpitation, SOB, headache, dizziness, abdominal pain, N/V/D, dysuria, increase urinary frequency/urgency or nay other acute symptoms. In the ED, patient met sepsis criteria with Leukocytosis – WBC: 20.38, Lactic acidosis – Lactate:2.1, Tachycardic: HR-123, Febrile: T-max:101.9, Source: UTI. Troponin 299.5-->305.6. ECG- No ST changes. Cr:1.78. Received 1.5L-NS. Ofirmev, Ceftriaxone.   CT-Abd: Air within the wall of the urinary bladder and extending into adjacent veins. 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 (17 Oct 2024 03:02)      acetaminophen     Tablet .. 650 milliGRAM(s) Oral every 6 hours PRN  aluminum hydroxide/magnesium hydroxide/simethicone Suspension 30 milliLiter(s) Oral every 4 hours PRN  cefTRIAXone   IVPB 1000 milliGRAM(s) IV Intermittent every 24 hours  furosemide   Injectable 20 milliGRAM(s) IV Push once  heparin   Injectable 5000 Unit(s) SubCutaneous every 8 hours  melatonin 3 milliGRAM(s) Oral at bedtime PRN  nystatin Ointment 1 Application(s) Topical every 12 hours  ondansetron Injectable 4 milliGRAM(s) IV Push every 8 hours PRN  triamcinolone 0.1% Ointment 1 Application(s) Topical every 12 hours      T(F): 99.2 (10-17-24 @ 16:03), Max: 103.2 (10-17-24 @ 05:00)  HR: 113 (10-17-24 @ 16:03) (86 - 128)  BP: 139/45 (10-17-24 @ 16:03) (94/74 - 158/62)  RR: 25 (10-17-24 @ 16:03) (18 - 29)  SpO2: 96% (10-17-24 @ 16:03) (95% - 100%)  Wt(kg): --    PHYSICAL EXAM:  General: no distress  Neuro:  Alert & oriented x 3  CV: +S1+S2 regular rate and rhythm  Lung: +rales b/l  Abdomen: soft, NTND. Normactive BS    LABS:                        11.0   23.30 )-----------( 158      ( 17 Oct 2024 06:50 )             34.5     10-17    142  |  108  |  35[H]  ----------------------------<  108[H]  3.3[L]   |  24  |  1.49[H]    Ca    10.3[H]      17 Oct 2024 06:50  Phos  1.7     10-17  Mg     2.1     10-17    TPro  6.0  /  Alb  2.4[L]  /  TBili  0.6  /  DBili  x   /  AST  81[H]  /  ALT  41  /  AlkPhos  181[H]  10-17    PT/INR - ( 16 Oct 2024 20:21 )   PT: 13.7 sec;   INR: 1.21 ratio         PTT - ( 16 Oct 2024 20:21 )  PTT:26.3 sec      I&O's Detail        Assessment:  HPI:  84 year old Surinamese speaking female with a PMH of HTN, HLP, Pre-DM, CAD s/p PCI, frequent UTI BIBEMS from home to be evaluated for AMS. As per daughter, patient went to take a nap s/p neurology appointment, they went to wake her up, she was difficult to arose and appeared confused. Upon evaluation, patient AAOx3, back to baseline as per daughter. Endorses she is feeling weak, otherwise no pain or discomfort. Denies chest pain, palpitation, SOB, headache, dizziness, abdominal pain, N/V/D, dysuria, increase urinary frequency/urgency or nay other acute symptoms. In the ED, patient met sepsis criteria with Leukocytosis – WBC: 20.38, Lactic acidosis – Lactate:2.1, Tachycardic: HR-123, Febrile: T-max:101.9, Source: UTI. Troponin 299.5-->305.6. ECG- No ST changes. Cr:1.78. Received 1.5L-NS. Ofirmev, Ceftriaxone.   CT-Abd: Air within the wall of the urinary bladder and extending into adjacent veins. 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 (17 Oct 2024 03:02)    +SOB most likely fluids overload    Sepsis/ UTI    HTN (Hypertension)    Renal Calculi    H/O: Osteoarthritis    Recurrent UTI    Prediabetes    Pulmonary fibrosis        Plan:  - will stop IV fluids  - repeat CXR, CT scan showed mild CHF  - Lasix 20mg IVP x stat Called to evaluate patient for +SOB.  Patient Welsh speaking translation by family member at bedside, feeling increased SOB, +cough.  No chest pain, headache, dizziness.    HPI:  84 year old Korean speaking female with a PMH of HTN, HLP, Pre-DM, CAD s/p PCI, frequent UTI BIBEMS from home to be evaluated for AMS. As per daughter, patient went to take a nap s/p neurology appointment, they went to wake her up, she was difficult to arose and appeared confused. Upon evaluation, patient AAOx3, back to baseline as per daughter. Endorses she is feeling weak, otherwise no pain or discomfort. Denies chest pain, palpitation, SOB, headache, dizziness, abdominal pain, N/V/D, dysuria, increase urinary frequency/urgency or nay other acute symptoms. In the ED, patient met sepsis criteria with Leukocytosis – WBC: 20.38, Lactic acidosis – Lactate:2.1, Tachycardic: HR-123, Febrile: T-max:101.9, Source: UTI. Troponin 299.5-->305.6. ECG- No ST changes. Cr:1.78. Received 1.5L-NS. Ofirmev, Ceftriaxone.   CT-Abd: Air within the wall of the urinary bladder and extending into adjacent veins. 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 (17 Oct 2024 03:02)      acetaminophen     Tablet .. 650 milliGRAM(s) Oral every 6 hours PRN  aluminum hydroxide/magnesium hydroxide/simethicone Suspension 30 milliLiter(s) Oral every 4 hours PRN  cefTRIAXone   IVPB 1000 milliGRAM(s) IV Intermittent every 24 hours  furosemide   Injectable 20 milliGRAM(s) IV Push once  heparin   Injectable 5000 Unit(s) SubCutaneous every 8 hours  melatonin 3 milliGRAM(s) Oral at bedtime PRN  nystatin Ointment 1 Application(s) Topical every 12 hours  ondansetron Injectable 4 milliGRAM(s) IV Push every 8 hours PRN  triamcinolone 0.1% Ointment 1 Application(s) Topical every 12 hours      T(F): 99.2 (10-17-24 @ 16:03), Max: 103.2 (10-17-24 @ 05:00)  HR: 113 (10-17-24 @ 16:03) (86 - 128)  BP: 139/45 (10-17-24 @ 16:03) (94/74 - 158/62)  RR: 25 (10-17-24 @ 16:03) (18 - 29)  SpO2: 96% (10-17-24 @ 16:03) (95% - 100%)  Wt(kg): --    PHYSICAL EXAM:  General: +SOB  CV: +S1+S2 regular rate and rhythm  Lung: +rales b/l  Abdomen: soft, NTND. Normactive BS    LABS:                        11.0   23.30 )-----------( 158      ( 17 Oct 2024 06:50 )             34.5     10-17    142  |  108  |  35[H]  ----------------------------<  108[H]  3.3[L]   |  24  |  1.49[H]    Ca    10.3[H]      17 Oct 2024 06:50  Phos  1.7     10-17  Mg     2.1     10-17    TPro  6.0  /  Alb  2.4[L]  /  TBili  0.6  /  DBili  x   /  AST  81[H]  /  ALT  41  /  AlkPhos  181[H]  10-17    PT/INR - ( 16 Oct 2024 20:21 )   PT: 13.7 sec;   INR: 1.21 ratio         PTT - ( 16 Oct 2024 20:21 )  PTT:26.3 sec      I&O's Detail        Assessment:  HPI:  84 year old Korean speaking female with a PMH of HTN, HLP, Pre-DM, CAD s/p PCI, frequent UTI BIBEMS from home to be evaluated for AMS. As per daughter, patient went to take a nap s/p neurology appointment, they went to wake her up, she was difficult to arose and appeared confused. Upon evaluation, patient AAOx3, back to baseline as per daughter. Endorses she is feeling weak, otherwise no pain or discomfort. Denies chest pain, palpitation, SOB, headache, dizziness, abdominal pain, N/V/D, dysuria, increase urinary frequency/urgency or nay other acute symptoms. In the ED, patient met sepsis criteria with Leukocytosis – WBC: 20.38, Lactic acidosis – Lactate:2.1, Tachycardic: HR-123, Febrile: T-max:101.9, Source: UTI. Troponin 299.5-->305.6. ECG- No ST changes. Cr:1.78. Received 1.5L-NS. Ofirmev, Ceftriaxone.   CT-Abd: Air within the wall of the urinary bladder and extending into adjacent veins. 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 (17 Oct 2024 03:02)    +SOB most likely fluids overload    Sepsis/ UTI    HTN (Hypertension)    Renal Calculi    H/O: Osteoarthritis    Recurrent UTI    Prediabetes    Pulmonary fibrosis        Plan:  - will stop IV fluids  - repeat CXR, CT scan showed mild CHF  - Lasix 20mg IVP x stat Called to evaluate patient for +SOB.  Patient Hebrew speaking translation by family member at bedside, feeling increased SOB, +cough.  No chest pain, headache, dizziness.    HPI:  84 year old Bulgarian speaking female with a PMH of HTN, HLP, Pre-DM, CAD s/p PCI, frequent UTI BIBEMS from home to be evaluated for AMS. As per daughter, patient went to take a nap s/p neurology appointment, they went to wake her up, she was difficult to arose and appeared confused. Upon evaluation, patient AAOx3, back to baseline as per daughter. Endorses she is feeling weak, otherwise no pain or discomfort. Denies chest pain, palpitation, SOB, headache, dizziness, abdominal pain, N/V/D, dysuria, increase urinary frequency/urgency or nay other acute symptoms. In the ED, patient met sepsis criteria with Leukocytosis – WBC: 20.38, Lactic acidosis – Lactate:2.1, Tachycardic: HR-123, Febrile: T-max:101.9, Source: UTI. Troponin 299.5-->305.6. ECG- No ST changes. Cr:1.78. Received 1.5L-NS. Ofirmev, Ceftriaxone.   CT-Abd: Air within the wall of the urinary bladder and extending into adjacent veins. 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 (17 Oct 2024 03:02)      acetaminophen     Tablet .. 650 milliGRAM(s) Oral every 6 hours PRN  aluminum hydroxide/magnesium hydroxide/simethicone Suspension 30 milliLiter(s) Oral every 4 hours PRN  cefTRIAXone   IVPB 1000 milliGRAM(s) IV Intermittent every 24 hours  furosemide   Injectable 20 milliGRAM(s) IV Push once  heparin   Injectable 5000 Unit(s) SubCutaneous every 8 hours  melatonin 3 milliGRAM(s) Oral at bedtime PRN  nystatin Ointment 1 Application(s) Topical every 12 hours  ondansetron Injectable 4 milliGRAM(s) IV Push every 8 hours PRN  triamcinolone 0.1% Ointment 1 Application(s) Topical every 12 hours      T(F): 99.2 (10-17-24 @ 16:03), Max: 103.2 (10-17-24 @ 05:00)  HR: 113 (10-17-24 @ 16:03) (86 - 128)  BP: 139/45 (10-17-24 @ 16:03) (94/74 - 158/62)  RR: 25 (10-17-24 @ 16:03) (18 - 29)  SpO2: 96% (10-17-24 @ 16:03) (95% - 100%)  Wt(kg): --    PHYSICAL EXAM:  General: +SOB  CV: +S1+S2 regular rate and rhythm  Lung: +rales b/l  Abdomen: soft, NTND. Normactive BS    LABS:                        11.0   23.30 )-----------( 158      ( 17 Oct 2024 06:50 )             34.5     10-17    142  |  108  |  35[H]  ----------------------------<  108[H]  3.3[L]   |  24  |  1.49[H]    Ca    10.3[H]      17 Oct 2024 06:50  Phos  1.7     10-17  Mg     2.1     10-17    TPro  6.0  /  Alb  2.4[L]  /  TBili  0.6  /  DBili  x   /  AST  81[H]  /  ALT  41  /  AlkPhos  181[H]  10-17    PT/INR - ( 16 Oct 2024 20:21 )   PT: 13.7 sec;   INR: 1.21 ratio         PTT - ( 16 Oct 2024 20:21 )  PTT:26.3 sec      I&O's Detail        Assessment:  HPI:  84 year old Bulgarian speaking female with a PMH of HTN, HLP, Pre-DM, CAD s/p PCI, frequent UTI BIBEMS from home to be evaluated for AMS. As per daughter, patient went to take a nap s/p neurology appointment, they went to wake her up, she was difficult to arose and appeared confused. Upon evaluation, patient AAOx3, back to baseline as per daughter. Endorses she is feeling weak, otherwise no pain or discomfort. Denies chest pain, palpitation, SOB, headache, dizziness, abdominal pain, N/V/D, dysuria, increase urinary frequency/urgency or nay other acute symptoms. In the ED, patient met sepsis criteria with Leukocytosis – WBC: 20.38, Lactic acidosis – Lactate:2.1, Tachycardic: HR-123, Febrile: T-max:101.9, Source: UTI. Troponin 299.5-->305.6. ECG- No ST changes. Cr:1.78. Received 1.5L-NS. Ofirmev, Ceftriaxone.   CT-Abd: Air within the wall of the urinary bladder and extending into adjacent veins. 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 (17 Oct 2024 03:02)    +SOB most likely fluids overload    Sepsis/ UTI    HTN (Hypertension)    Renal Calculi    H/O: Osteoarthritis    Recurrent UTI    Prediabetes    Pulmonary fibrosis        Plan:  - will stop IV fluid, received >2L of IV fluid  - repeat CXR, CT scan showed mild CHF  - Lasix 20mg IVP x stat

## 2024-10-17 NOTE — CHART NOTE - NSCHARTNOTEFT_GEN_A_CORE
Seen with daughter Inga at bedside.  Reports pt near baseline, much improved since initial presentation.  No complaints.    PHYSICAL EXAMINATION:  GENERAL: NAD, Alert CARDIOVASCULAR: RRR S1, S2.  LUNGS: CTAB, - rales, - wheezing, - rhonchi.  BACK: - CVA tenderness.  ABDOMEN: Soft, - tenderness, - distension, + BS.  EXTREMITIES: - cyanosis, - clubbing, - edema.  VITALS:  Vital Signs Last 24 Hrs  T(C): 37.3 (17 Oct 2024 16:03), Max: 39.6 (17 Oct 2024 05:00)  T(F): 99.2 (17 Oct 2024 16:03), Max: 103.2 (17 Oct 2024 05:00)  HR: 113 (17 Oct 2024 16:03) (86 - 128)  BP: 139/45 (17 Oct 2024 16:03) (94/74 - 158/62)  BP(mean): 74 (16 Oct 2024 22:30) (62 - 82)  RR: 25 (17 Oct 2024 16:03) (18 - 29)  SpO2: 96% (17 Oct 2024 16:03) (95% - 100%)    Parameters below as of 17 Oct 2024 16:03  Patient On (Oxygen Delivery Method): room air     Daily Height in cm: 157.48 (16 Oct 2024 18:58)    Daily   CAPILLARY BLOOD GLUCOSE      POCT Blood Glucose.: 144 mg/dL (17 Oct 2024 00:06)    I&O's Summary      LABS:                        11.0   23.30 )-----------( 158      ( 17 Oct 2024 06:50 )             34.5     10-17    142  |  108  |  35[H]  ----------------------------<  108[H]  3.3[L]   |  24  |  1.49[H]    Ca    10.3[H]      17 Oct 2024 06:50  Phos  1.7     10-17  Mg     2.1     10-17    TPro  6.0  /  Alb  2.4[L]  /  TBili  0.6  /  DBili  x   /  AST  81[H]  /  ALT  41  /  AlkPhos  181[H]  10-17    PT/INR - ( 16 Oct 2024 20:21 )   PT: 13.7 sec;   INR: 1.21 ratio         PTT - ( 16 Oct 2024 20:21 )  PTT:26.3 sec  LIVER FUNCTIONS - ( 17 Oct 2024 06:50 )  Alb: 2.4 g/dL / Pro: 6.0 gm/dL / ALK PHOS: 181 U/L / ALT: 41 U/L / AST: 81 U/L / GGT: x           Urinalysis Basic - ( 17 Oct 2024 06:50 )    Color: x / Appearance: x / SG: x / pH: x  Gluc: 108 mg/dL / Ketone: x  / Bili: x / Urobili: x   Blood: x / Protein: x / Nitrite: x   Leuk Esterase: x / RBC: x / WBC x   Sq Epi: x / Non Sq Epi: x / Bacteria: x            Urinalysis with Rflx Culture (collected 17 Oct 2024 02:15)    Culture - Blood (collected 16 Oct 2024 20:21)  Source: .Blood BLOOD  Gram Stain (prelim) (17 Oct 2024 12:25):    Growth in aerobic bottle: Gram Negative Rods    Growth in anaerobic bottle: Gram Negative Rods  Preliminary Report (17 Oct 2024 12:26):    Growth in aerobic bottle: Gram Negative Rods    Growth in anaerobic bottle: Gram Negative Rods    Direct identification is available within approximately 3-5    hours either by Blood Panel Multiplexed PCR or Direct    MALDI-TOF. Details: [email or web address]  Organism: Blood Culture PCR (17 Oct 2024 12:31)  Organism: Blood Culture PCR (17 Oct 2024 12:31)    Culture - Blood (collected 16 Oct 2024 20:21)  Source: .Blood BLOOD  Gram Stain (prelim) (17 Oct 2024 11:38):    Growth in anaerobic bottle: Gram Negative Rods    Growth in aerobic bottle: Gram Negative Rods  Preliminary Report (17 Oct 2024 11:38):    Growth in anaerobic bottle: Gram Negative Rods    Growth in aerobic bottle: Gram Negative Rods        MEDICATIONS:  acetaminophen     Tablet .. 650 milliGRAM(s) Oral every 6 hours PRN  aluminum hydroxide/magnesium hydroxide/simethicone Suspension 30 milliLiter(s) Oral every 4 hours PRN  cefTRIAXone   IVPB 1000 milliGRAM(s) IV Intermittent every 24 hours  heparin   Injectable 5000 Unit(s) SubCutaneous every 8 hours  melatonin 3 milliGRAM(s) Oral at bedtime PRN  nystatin Ointment 1 Application(s) Topical every 12 hours  ondansetron Injectable 4 milliGRAM(s) IV Push every 8 hours PRN  sodium chloride 0.9%. 1000 milliLiter(s) IV Continuous <Continuous>  triamcinolone 0.1% Ointment 1 Application(s) Topical every 12 hours        # Sepsis  # UTI  # Gram negative Bacteremia  # Elevated troponin    Trop stable  F/u cultures.  ID evaluation as WBC 23.  Clinically improving.  D/w daughter Alba  OOB with PT.    See H&P dated today.

## 2024-10-17 NOTE — H&P ADULT - NSHPLABSRESULTS_GEN_ALL_CORE
12.5   20.38 )-----------( 191      ( 16 Oct 2024 20:21 )             38.3     135  |  100  |  36[H]  ----------------------------<  136[H]     10-16  4.1   |  25  |  1.78[H]    Ca    11.0[H]      16 Oct 2024 20:21    TPro  7.4  /  Alb  2.8[L]  /  TBili  0.8  /  DBili  x   /  AST  68[H]  /  ALT  47  /  AlkPhos  231[H]  10-16    PT/INR: 13.7/1.21 (10-16-24 @ 20:21)  PTT: 26.3 (10-16-24 @ 20:21)      20:42 - VBG - pH: 7.42  | pCO2: 47    | pO2: 29    | Lactate: 2.40       Urinalysis Basic - ( 17 Oct 2024 02:15 )  Color: Dark Yellow / Appearance: Cloudy / S.019 / pH: 5.5  Gluc: Negative mg/dL / Ketone: Trace mg/dL  / Bili: Negative / Urobili: 1.0 mg/dL   Blood: Large / Protein: 100 mg/dL / Nitrite: Positive   Leuk Esterase: Moderate / RBC: 25 /HPF / WBC >50 /HPF   Sq Epi: x / Bacteria: Moderate /HPF  Hyaline Casts: x/WBC Casts: x      Urinalysis with Rflx Culture (collected 17 Oct 2024 02:15)      :  Subpleural interstitial prominence in the bilateral lungs, mild but increased compared to 2019. This could represent interval progression of chronic interstitial lung disease, or mild acute interstitial pulmonary edema, or atypical pneumonia.    Mucus plugging of several right middle lobe and bilateral lower lobe segmental and subsegmental bronchi.    Coronary artery atherosclerosis.    ABDOMEN/PELVIS:  Air within the wall of the urinary bladder, and extending into adjacent veins. 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 Recommend urinalysis. If there was recent instrumentation, this could potentially also cause this finding.    The mid appendix is mildly dilated with fluid. There is no adjacent inflammation and this is probably incidental. However early acute appendicitis, while not likely, cannot be entirely excluded.    Linear gallbladder calcifications could represent calcifications within the wall or partially calcified gallstones. There may be gallbladder wall thickening, not well evaluated. Recommend gallbladder ultrasound.    THORACOLUMBAR SPINE:  No acute fracture. Late subacute to chronic moderate compression fracture of T12.

## 2024-10-17 NOTE — H&P ADULT - PROBLEM SELECTOR PLAN 4
Troponin 299.5--> 305  Unlikely ischemic.  ECG: Tachycardia - No ST changes   - ASA   - Statin   - Trend troponin   - Heparin SubQ

## 2024-10-18 LAB
A1C WITH ESTIMATED AVERAGE GLUCOSE RESULT: 6.6 % — HIGH (ref 4–5.6)
ALBUMIN SERPL ELPH-MCNC: 2.4 G/DL — LOW (ref 3.3–5)
ALP SERPL-CCNC: 194 U/L — HIGH (ref 40–120)
ALT FLD-CCNC: 47 U/L — SIGNIFICANT CHANGE UP (ref 12–78)
ANION GAP SERPL CALC-SCNC: 8 MMOL/L — SIGNIFICANT CHANGE UP (ref 5–17)
AST SERPL-CCNC: 77 U/L — HIGH (ref 15–37)
BILIRUB SERPL-MCNC: 0.5 MG/DL — SIGNIFICANT CHANGE UP (ref 0.2–1.2)
BUN SERPL-MCNC: 29 MG/DL — HIGH (ref 7–23)
CALCIUM SERPL-MCNC: 10.7 MG/DL — HIGH (ref 8.5–10.1)
CHLORIDE SERPL-SCNC: 107 MMOL/L — SIGNIFICANT CHANGE UP (ref 96–108)
CHOLEST SERPL-MCNC: 95 MG/DL — SIGNIFICANT CHANGE UP
CO2 SERPL-SCNC: 26 MMOL/L — SIGNIFICANT CHANGE UP (ref 22–31)
CREAT SERPL-MCNC: 1.15 MG/DL — SIGNIFICANT CHANGE UP (ref 0.5–1.3)
EGFR: 47 ML/MIN/1.73M2 — LOW
ESTIMATED AVERAGE GLUCOSE: 143 MG/DL — HIGH (ref 68–114)
GLUCOSE SERPL-MCNC: 126 MG/DL — HIGH (ref 70–99)
HCT VFR BLD CALC: 37 % — SIGNIFICANT CHANGE UP (ref 34.5–45)
HDLC SERPL-MCNC: 25 MG/DL — LOW
HGB BLD-MCNC: 11.8 G/DL — SIGNIFICANT CHANGE UP (ref 11.5–15.5)
LIPID PNL WITH DIRECT LDL SERPL: 40 MG/DL — SIGNIFICANT CHANGE UP
MCHC RBC-ENTMCNC: 28.6 PG — SIGNIFICANT CHANGE UP (ref 27–34)
MCHC RBC-ENTMCNC: 31.9 G/DL — LOW (ref 32–36)
MCV RBC AUTO: 89.6 FL — SIGNIFICANT CHANGE UP (ref 80–100)
NON HDL CHOLESTEROL: 70 MG/DL — SIGNIFICANT CHANGE UP
NRBC # BLD: 0 /100 WBCS — SIGNIFICANT CHANGE UP (ref 0–0)
PLATELET # BLD AUTO: 171 K/UL — SIGNIFICANT CHANGE UP (ref 150–400)
POTASSIUM SERPL-MCNC: 3.8 MMOL/L — SIGNIFICANT CHANGE UP (ref 3.5–5.3)
POTASSIUM SERPL-SCNC: 3.8 MMOL/L — SIGNIFICANT CHANGE UP (ref 3.5–5.3)
PROT SERPL-MCNC: 7 GM/DL — SIGNIFICANT CHANGE UP (ref 6–8.3)
RBC # BLD: 4.13 M/UL — SIGNIFICANT CHANGE UP (ref 3.8–5.2)
RBC # FLD: 15.8 % — HIGH (ref 10.3–14.5)
SODIUM SERPL-SCNC: 141 MMOL/L — SIGNIFICANT CHANGE UP (ref 135–145)
TRIGL SERPL-MCNC: 180 MG/DL — HIGH
WBC # BLD: 22.18 K/UL — HIGH (ref 3.8–10.5)
WBC # FLD AUTO: 22.18 K/UL — HIGH (ref 3.8–10.5)

## 2024-10-18 PROCEDURE — 99233 SBSQ HOSP IP/OBS HIGH 50: CPT

## 2024-10-18 PROCEDURE — 93010 ELECTROCARDIOGRAM REPORT: CPT

## 2024-10-18 PROCEDURE — 99223 1ST HOSP IP/OBS HIGH 75: CPT

## 2024-10-18 RX ORDER — CEFTRIAXONE SODIUM 10 G
2000 VIAL (EA) INJECTION EVERY 24 HOURS
Refills: 0 | Status: DISCONTINUED | OUTPATIENT
Start: 2024-10-18 | End: 2024-10-22

## 2024-10-18 RX ADMIN — Medication 1 APPLICATION(S): at 18:11

## 2024-10-18 RX ADMIN — HEPARIN SODIUM 5000 UNIT(S): 10000 INJECTION INTRAVENOUS; SUBCUTANEOUS at 22:07

## 2024-10-18 RX ADMIN — Medication 650 MILLIGRAM(S): at 06:27

## 2024-10-18 RX ADMIN — Medication 650 MILLIGRAM(S): at 05:57

## 2024-10-18 RX ADMIN — Medication 100 MILLIGRAM(S): at 14:21

## 2024-10-18 RX ADMIN — HEPARIN SODIUM 5000 UNIT(S): 10000 INJECTION INTRAVENOUS; SUBCUTANEOUS at 05:59

## 2024-10-18 RX ADMIN — NYSTATIN 1 APPLICATION(S): 100000 POWDER TOPICAL at 18:11

## 2024-10-18 RX ADMIN — HEPARIN SODIUM 5000 UNIT(S): 10000 INJECTION INTRAVENOUS; SUBCUTANEOUS at 14:24

## 2024-10-18 NOTE — CONSULT NOTE ADULT - ASSESSMENT
The patient is 84 year old South African speaking woman with a PMH of HTN, HLP, Pre-DM, CAD s/p PCI, frequent UTIs, who was BIBEMS from home to be evaluated for AMS. As per daughter, patient went to take a nap s/p neurology appointment, they went to wake her up, she was difficult to arouse and appeared confused. Upon evaluation, patient AAOx3, back to baseline as per daughter. Endorses she is feeling weak, otherwise no pain or discomfort. Denies chest pain, palpitation, SOB, headache, dizziness, abdominal pain, N/V/D, dysuria, increase urinary frequency/urgency or any other acute symptoms. In the ED, patient met sepsis criteria with Leukocytosis – WBC: 20.38, Lactic acidosis – Lactate:2.1, Tachycardic: HR-123, Febrile: T-max:101.9, Source: UTI. Troponin 299.5-->305.6. ECG- No ST changes. Cr:1.78. Received 1.5L-NS. Ofirmev, Ceftriaxone. CT-Abd: Air within the wall of the urinary bladder and extending into adjacent veins. 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 (17 Oct 2024 03:02)    ID consulted for workup and antibiotic management       10/18: Febrile, T-max 103. Appears comfortable. Admission blood cultures grew E. Coli. Urine culture is pending. Family reported urinary incontinence for a while. She has recurrent UTI's, 4 times this year requiring course of antibiotics. She has sustained T12 fracture after a fall recently. Also has remote history of kidney stones. No recent urinary instrumentation. Follows Dr. Stefan Valle    Impression:  1. E. Coli bacteremia  2. Acute emphysematous cystitis  3. Fever and leukocytosis   4. Lactic acidosis  5. Transient encephalopathy  6. Hx of recurrent UTI's  7. Chronic interstitial lung disease in former smoker  8. Co-morbidities: Old age, CAD, recent T12 fracture, high BMI      Recommendations:  - Continue ceftriaxone, increased to 2 g IV q24  - Follow urine culture and blood culture  - Urology evaluation  - Trend white count, lactate and fever curve  - Rest of care per primary team    Discussed with Dr. Ondina Interiano MD  Attending Physician  Division of Infectious Diseases   Available via [employer] Teams

## 2024-10-18 NOTE — PHYSICAL THERAPY INITIAL EVALUATION ADULT - PERTINENT HX OF CURRENT PROBLEM, REHAB EVAL
As per H& P "84 year old [place]ym[place] speaking female with a PMH of HTN, HLP, Pre-DM, CAD s/p PCI, frequent UTI BIBEMS from home to be evaluated for AMS. As per daughter, patient went to take a nap s/p neurology appointment, they went to wake her up, she was difficult to arose and appeared confused. Upon evaluation, patient AAOx3, back to baseline as per daughter. Endorses she is feeling weak, otherwise no pain or discomfort."

## 2024-10-18 NOTE — PHYSICAL THERAPY INITIAL EVALUATION ADULT - GENERAL OBSERVATIONS, REHAB EVAL
pt was received in supine, Daughter Inga Kramer was at bed side translated English to mom, agreed for PT eval,

## 2024-10-18 NOTE — PHYSICAL THERAPY INITIAL EVALUATION ADULT - BALANCE TRAINING, PT EVAL
Pt will improve static & dynamic standing balance to Good using [Rolling walker]  to perform  Gait with Sheba x1  in 2 weeks

## 2024-10-18 NOTE — PROGRESS NOTE ADULT - ASSESSMENT
84 year old Polish speaking female with a PMH of HTN, HLP, Pre-DM, CAD s/p PCI, frequent UTI BIBEMS from home to be evaluated for AMS. As per daughter, patient went to take a nap s/p neurology appointment, they went to wake her up, she was difficult to arose and appeared confused. Upon evaluation, patient AAOx3, back to baseline as per daughter. Endorses she is feeling weak, otherwise no pain or discomfort. Denies chest pain, palpitation, SOB, headache, dizziness, abdominal pain, N/V/D, dysuria, increase urinary frequency/urgency or nay other acute symptoms. In the ED, patient met sepsis criteria with Leukocytosis – WBC: 20.38, Lactic acidosis – Lactate:2.1, Tachycardic: HR-123, Febrile: T-max:101.9, Source: UTI. Troponin 299.5-->305.6. ECG- No ST changes. Cr:1.78. Received 1.5L-NS. Ofirmev, Ceftriaxone.             Problem/Plan - 1:  ·  Problem: At risk for sepsis due to urinary tract infection.   ·  Plan: In the ED, patient met sepsis criteria with Leukocytosis – WBC: 20.38, Lactic acidosis – Lactate:2.1, Tachycardic: HR-123, Febrile: T-max:101.9, Source: UTI.  UA: (+) Nitrite, Moderate- Leuk esterase, WBC>50, Moderate Bacteria  CT-Abd: Air within the wall of the urinary bladder and extending into adjacent veins. 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   - Tele  - IV-NS  - Ceftriaxone   - Tylenol for fever   - F/U Culture   - DVT Prophylaxis.    Problem/Plan - 2:  ·  Problem: Altered mental state.   ·  Plan: AMS most likely 2/2 underlying UTI   - Monitor.    Problem/Plan - 3:  ·  Problem: MC (acute kidney injury).   ·  Plan: Cr: 1.78  - IV-NS  - Monitor Kidney function   - Avoid nephrotoxic drugs.    Problem/Plan - 4:  ·  Problem: Elevated troponin.   ·  Plan: Troponin 299.5--> 305  Unlikely ischemic.  ECG: Tachycardia - No ST changes   - ASA   - Statin   - Trend troponin   - Heparin SubQ.    Problem/Plan - 5:  ·  Problem: Hypertension.   ·  Plan: Continue home meds   - HCTZ 12.5mg   - Met succ 50mg   - Monitor BP.     84 year old [place] speaking female with a PMH of HTN, HLP, Pre-DM, CAD s/p PCI, frequent UTI BIBEMS from home to be evaluated for AMS. As per daughter, patient went to take a nap s/p neurology appointment, they went to wake her up, she was difficult to arose and appeared confused. Upon evaluation, patient AAOx3, back to baseline as per daughter. Endorses she is feeling weak, otherwise no pain or discomfort. Denies chest pain, palpitation, SOB, headache, dizziness, abdominal pain, N/V/D, dysuria, increase urinary frequency/urgency or nay other acute symptoms. In the ED, patient met sepsis criteria with Leukocytosis – WBC: 20.38, Lactic acidosis – Lactate:2.1, Tachycardic: HR-123, Febrile: T-max:101.9, Source: UTI. Troponin 299.5-->305.6. ECG- No ST changes. Cr:1.78. Received 1.5L-NS. Ofirmev, Ceftriaxone.     Problem/Plan - 1:  ·  Problem: At risk for sepsis due to urinary tract infection. E coli bacteremia  ·  Plan: In the ED, patient met sepsis criteria with Leukocytosis – WBC: 20.38, Lactic acidosis – Lactate:2.1, Tachycardic: HR-123, Febrile: T-max:101.9, Source: UTI.  UA: (+) Nitrite, Moderate- Leuk esterase, WBC>50, Moderate Bacteria  CT-Abd: Air within the wall of the urinary bladder and extending into adjacent veins. 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   - Tele  - IV-NS  - Ceftriaxone   - Tylenol for fever   - Culture E coli bacteremia  - DVT Prophylaxis.    Problem/Plan - 2:  ·  Problem: Altered mental state.   ·  Plan: Resolved    Problem/Plan - 3:  ·  Problem: MC (acute kidney injury).   ·  Plan: Cr: 1.78  - IV-NS  - Monitor Kidney function   - Avoid nephrotoxic drugs.    Problem/Plan - 4:  ·  Problem: Elevated troponin.   ·  Plan: Troponin 299.5--> 305  Unlikely ischemic.  ECG: Tachycardia - No ST changes   - ASA   - Statin   - Trend troponin   - Heparin SubQ.    Problem/Plan - 5:  ·  Problem: Hypertension.   ·  Plan: Continue home meds   - held HCTZ 12.5mg   - held Met succ 50mg   - Monitor BP.

## 2024-10-18 NOTE — CONSULT NOTE ADULT - SUBJECTIVE AND OBJECTIVE BOX
[facility]  INFECTIOUS DISEASES   [address] [place] [address]  Tel: [phone]     Fax: [phone]  ==============================================================================  DO Ramirez Smith MD Sehrish Shahid, MD Alexandra Gutman, NP   ==============================================================================    JOSE M ZHOU  MRN-[id number]  Female  84y ([birth year])        Patient is a 84y old  Female who presents with a chief complaint of AMS & Sepsis Secondary  to UTI (18 Oct 2024 09:56)      HPI:  84 year old [place]ian speaking female with a PMH of HTN, HLP, Pre-DM, CAD s/p PCI, frequent UTI BIBEMS from home to be evaluated for AMS. As per daughter, patient went to take a nap s/p neurology appointment, they went to wake her up, she was difficult to arose and appeared confused. Upon evaluation, patient AAOx3, back to baseline as per daughter. Endorses she is feeling weak, otherwise no pain or discomfort. Denies chest pain, palpitation, SOB, headache, dizziness, abdominal pain, N/V/D, dysuria, increase urinary frequency/urgency or nay other acute symptoms. In the ED, patient met sepsis criteria with Leukocytosis – WBC: 20.38, Lactic acidosis – Lactate:2.1, Tachycardic: HR-123, Febrile: T-max:101.9, Source: UTI. Troponin 299.5-->305.6. ECG- No ST changes. Cr:1.78. Received 1.5L-NS. Ofirmev, Ceftriaxone.   CT-Abd: Air within the wall of the urinary bladder and extending into adjacent veins. 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 (17 Oct 2024 03:02)    ID consulted for workup and antibiotic management       PAST MEDICAL & SURGICAL HISTORY:  HTN (Hypertension)  Renal Calculi  H/O: Osteoarthritis  Recurrent UTI  Prediabetes  Pulmonary fibrosis      Allergies  No Known Allergies      ANTIMICROBIALS:  cefTRIAXone   IVPB 1000 every 24 hours    MEDICATIONS  (STANDING):  cefTRIAXone   IVPB   100 mL/Hr IV Intermittent (10-16-24 @ 20:49)    cefTRIAXone   IVPB   100 mL/Hr IV Intermittent (10-17-24 @ 18:27)      OTHER MEDS: MEDICATIONS  (STANDING):  acetaminophen     Tablet .. 650 every 6 hours PRN  aluminum hydroxide/magnesium hydroxide/simethicone Suspension 30 every 4 hours PRN  heparin   Injectable 5000 every 8 hours  melatonin 3 at bedtime PRN  ondansetron Injectable 4 every 8 hours PRN      SOCIAL HISTORY:     Smoking Cigarettes [ ]Active [ ] Former [ ]Denies   ETOH [ ]denies [ ]Former [ ]Current Use denies   Drug Use [ ]Never [ ] Former [ ] Active     FAMILY HISTORY:      REVIEW OF SYSTEMS  [  ] ROS unobtainable because:    [  ] All other systems negative except as noted below:	    Constitutional:  [ ] fever [ ] chills  [ ] weight loss  [ ] weakness  Skin:  [ ] rash [ ] phlebitis	  Eyes: [ ] icterus [ ] pain  [ ] discharge	  ENMT: [ ] sore throat  [ ] thrush [ ] ulcers [ ] exudates  Respiratory: [ ] dyspnea [ ] hemoptysis [ ] cough [ ] sputum	  Cardiovascular:  [ ] chest pain [ ] palpitations [ ] edema	  Gastrointestinal:  [ ] nausea [ ] vomiting [ ] diarrhea [ ] constipation [ ] pain	  Genitourinary:  [ ] dysuria [ ] frequency [ ] hematuria [ ] discharge [ ] flank pain  [ ] incontinence  Musculoskeletal:  [ ] myalgias [ ] arthralgias [ ] arthritis  [ ] back pain  Neurological:  [ ] headache [ ] seizures  [ ] confusion/altered mental status  Psychiatric:  [ ] anxiety [ ] depression	  Hematology/Lymphatics:  [ ] lymphadenopathy  Endocrine:  [ ] adrenal [ ] thyroid  Allergic/Immunologic:	 [ ] transplant [ ] seasonal    Vital Signs Last 24 Hrs  T(F): 98.7 (10-18-24 @ 08:54), Max: 103.2 (10-17-24 @ 05:00)    Vital Signs Last 24 Hrs  HR: 121 (10-18-24 @ 05:02) (111 - 121)  BP: 156/71 (10-18-24 @ 05:02) (127/68 - 156/71)  RR: 18 (10-18-24 @ 05:02)  SpO2: 98% (10-18-24 @ 05:02) (96% - 100%)  Wt(kg): --    PHYSICAL EXAM:  Constitutional: non-toxic, no distress  HEAD/EYES: anicteric, no conjunctival injection  ENT:  supple, no thrush  Cardiovascular:   normal S1, S2, no murmur, no edema  Respiratory:  clear BS bilaterally, no wheezes, no rales  GI:  soft, non-tender, normal bowel sounds  :  no ricci, no CVA tenderness  Musculoskeletal:  no synovitis, normal ROM  Neurologic: awake and alert, normal strength, no focal findings  Skin:  no rash, no erythema, no phlebitis  Heme/Onc: no lymphadenopathy   Psychiatric:  awake, alert, appropriate mood        WBC  WBC Count: 22.18 K/uL (10-18 @ 06:30)  WBC Count: 23.30 K/uL (10-17 @ 06:50)  WBC Count: 20.38 K/uL (10-16 @ 20:21)      Auto Neutrophil %: 93.7 % *H* (10-16-24 @ 20:21)  Auto Neutrophil #: 19.08 K/uL *H* (10-16-24 @ 20:21)    CBC                        11.8   22.18 )-----------( 171      ( 18 Oct 2024 06:30 )             37.0     BMP/CMP  10-18    141  |  107  |  29[H]  ----------------------------<  126[H]  3.8   |  26  |  1.15    Ca    10.7[H]      18 Oct 2024 06:30  Phos  1.7     10-17  Mg     2.1     10-17    TPro  7.0  /  Alb  2.4[L]  /  TBili  0.5  /  DBili  x   /  AST  77[H]  /  ALT  47  /  AlkPhos  194[H]  10-18    Creatinine Trend  Creatinine Trend: 1.15<--, 1.49<--, 1.78<--        Lactate, Blood: 1.9 mmol/L (10-17-24 @ 06:50)  Lactate, Blood: 1.6 mmol/L (10-17-24 @ 00:35)  Blood Gas Venous - Lactate: 2.40 mmol/L (10-16-24 @ 20:42)  Lactate, Blood: 2.1 mmol/L (10-16-24 @ 20:21)    Urinalysis with Rflx Culture (10.17.24 @ 02:15)   Urine Appearance: Cloudy   Color: Dark Yellow   Specific Gravity: 1.019   pH Urine: 5.5   Protein, Urine: 100 mg/dL   Glucose Qualitative, Urine: Negative mg/dL   Ketone - Urine: Trace mg/dL   Blood, Urine: Large   Bilirubin: Negative   Urobilinogen: 1.0 mg/dL   Leukocyte Esterase Concentration: Moderate   Nitrite: Positive   Urine Microscopic-Add On (NC) (10.17.24 @ 02:15)   Squamous Epithelial Cells: Present   Bacteria: Moderate /HPF   Red Blood Cell - Urine: 25 /HPF   White Blood Cell - Urine: >50 /HPF        MICROBIOLOGY:  .Blood BLOOD  10-16-24   Growth in anaerobic bottle: Gram Negative Rods  Growth in aerobic bottle: Gram Negative Rods  --  Blood Culture PCR  -  Escherichia coli: Detec (10.16.24 @ 20:21)            RADIOLOGY:      ACC: [id number] EXAM:  XR CHEST PORTABLE URGENT 1V   ORDERED BY: NYA RODRIGUEZ     PROCEDURE DATE:  10/16/2024          INTERPRETATION:  Portable AP chest radiograph    COMPARISON: 5/9/2024 chest x-ray.    CLINICAL INFORMATION: Sepsis.    FINDINGS:  CATHETERS AND TUBES: None    PULMONARY:  No airspace consolidations or radiographic evidence of pulmonary nodules..  No pleural effusion or pneumothorax.    HEART/VASCULAR: The  heart is mildly enlarged in transverse diameter. No   hilar mass.  .    BONES: The visualized osseous thorax is intact.    IMPRESSION:   No radiographic evidence of active chest disease..  Please see same day chest CT report.    --- End of Report ---        BREONNA ROSSI MD  This document has been electronically signed. Oct 17 2024  1:05PM    ACC: [id number] EXAM:  CT LUMBAR SPINE   ORDERED BY: NYA RODRIGUEZ     ACC: [id number] EXAM:  CT CHEST   ORDERED BY: NYA RODRIGUEZ     ACC: [id number] EXAM:  CT THORACIC SPINE   ORDERED BY: NYA RODRIGUEZ     ACC: [id number] EXAM:  CT ABDOMEN AND PELVIS   ORDERED BY: NYA RODRIGUEZ     PROCEDURE DATE:  10/16/2024          INTERPRETATION:  CLINICAL INFORMATION: 84F [place]ian speaking PMH HTN, HLD,   pre-DM, CAD s/p PCI, hx frequent UTIs / renal stones, dementia BIBEMS   accompanied by daughter d/t unresponsive episode this evening. T12   fracture one month ago.    COMPARISON: CT chest 9/30/2019.    CONTRAST/COMPLICATIONS:  IV Contrast: None.  Oral Contrast: None.  Complications: None documented.    PROCEDURE:  CT of the Chest, Abdomen and Pelvis and thoracolumbar spine was performed.  Sagittal and coronal reformats were performed.    FINDINGS:  CHEST:  LUNGS AND LARGE AIRWAYS: Trachea and main bronchi patent. Mucus plugging   of several right middle lobe and bilateral lower lobe segmental and   subsegmental bronchi. Bilateral subpleural interstitial prominence, mild   but increased compared to the previous study. Dependent atelectatic   changes.  PLEURA: No pleural effusion.  VESSELS: Calcific atherosclerosis including of the coronary arteries.   Main pulmonary artery mildly dilated 3.7 cm diameter.  HEART: Heart size is normal. No pericardial effusion.  MEDIASTINUM AND TODD: Mildly prominent right paratracheal and subcarinal   lymph nodes are similar to prior.  CHEST WALL AND LOWER NECK: Within normal limits.    ABDOMEN AND PELVIS:  LIVER: Within normal limits.  BILE DUCTS: Normal caliber.  GALLBLADDER: Linear calcifications and soft tissue density are difficult   to separate from the gallbladder wall.  SPLEEN: Within normal limits.  PANCREAS: Within normal limits.  ADRENALS: Within normal limits.  KIDNEYS/URETERS: No hydronephrosis. Small non obstructing stone lower pole   right kidney, with scattered right renal cortical calcification is and   scarring. Mild renal cortical volume loss bilaterally.    BLADDER: Air within the urinary bladder wall and within several veins   adjacent to the vagina, urinary bladder, and adnexa.  REPRODUCTIVE ORGANS: Several small uterine fibroids.    BOWEL: No bowel obstruction. Mid appendix mildly dilated with fluid (1.1   cm diameter on coronal image 44) but tapers normally, no adjacent   inflammation. Small hiatal hernia contains a portion of the gastric   fundus. Small noninflamed proximal duodenal diverticula. Left colonic   diverticulosis,no diverticulitis.  PERITONEUM/RETROPERITONEUM: Within normal limits.  VESSELS: Atherosclerotic changes. No abdominal aortic aneurysm.  LYMPH NODES: No lymphadenopathy.  ABDOMINAL WALL: Within normal limits.  BONES: Please see below regarding the vertebrae. No other fracture. No   aggressive osseous lesions.    THORACOLUMBAR SPINE:  VERTEBRAE: Late subacute to chronic appearing moderate compression   fracture of the T12 vertebral body, with retropulsion but only mild   spinal canal narrowing. No other fracture. Alignment otherwise anatomic.  SPINAL CANAL AND FORAMINA: Multilevel degenerative changes with no   apparent high grade spinal canal narrowing.  PARASPINAL SOFT TISSUES: No paravertebral hematoma or acute finding.    IMPRESSION:    CHEST:  Subpleural interstitial prominence in the bilateral lungs, mild but   increased compared to 2019. This could represent interval progression of   chronic interstitial lung disease, or mild acute interstitial pulmonary   edema, or atypical pneumonia.    Mucus plugging of several right middle lobe and bilateral lower lobe   segmental and subsegmental bronchi.    Coronary artery atherosclerosis.    ABDOMEN/PELVIS:  Air within the wall of the urinary bladder, and extending into adjacent   veins. 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   Recommend urinalysis. If there was recent instrumentation, this could   potentially also cause this finding.    The mid appendix is mildly dilated with fluid. There is no adjacent   inflammation and this is probably incidental. However early acute   appendicitis, while not likely, cannot be entirely excluded.    Linear gallbladder calcifications could represent calcifications within   the wall or partially calcified gallstones. There may be gallbladder wall   thickening, not well evaluated. Recommend gallbladder ultrasound.    THORACOLUMBAR SPINE:  No acute fracture. Late subacute to chronic moderate compression fracture   of T12.    Other chronic findings as above.      --- End ofReport ---            HERMAN ZAABLA MD  This document has been electronically signed. Oct 17 2024 12:50AM      I have personally reviewed the above imaging  [facility]  INFECTIOUS DISEASES   [address] [place] [address]  Tel: [phone]     Fax: [phone]  ==============================================================================  DO Ramirez Smith MD Sehrish Shahid, MD Alexandra Gutman, NP   ==============================================================================    JOSE M ZHOU  MRN-[id number]  Female  84y ([birth year])        Patient is a 84y old  Female who presents with a chief complaint of AMS & Sepsis Secondary  to UTI (18 Oct 2024 09:56)    The family present at bedside, daughter interpreted the entire encounter on patient's preference    HPI:  The patient is 84 year old [place] speaking woman with a PMH of HTN, HLP, Pre-DM, CAD s/p PCI, frequent UTIs, who was BIBEMS from home to be evaluated for AMS. As per daughter, patient went to take a nap s/p neurology appointment, they went to wake her up, she was difficult to arouse and appeared confused. Upon evaluation, patient AAOx3, back to baseline as per daughter. Endorses she is feeling weak, otherwise no pain or discomfort. Denies chest pain, palpitation, SOB, headache, dizziness, abdominal pain, N/V/D, dysuria, increase urinary frequency/urgency or any other acute symptoms. In the ED, patient met sepsis criteria with Leukocytosis – WBC: 20.38, Lactic acidosis – Lactate:2.1, Tachycardic: HR-123, Febrile: T-max:101.9, Source: UTI. Troponin 299.5-->305.6. ECG- No ST changes. Cr:1.78. Received 1.5L-NS. Ofirmev, Ceftriaxone. CT-Abd: Air within the wall of the urinary bladder and extending into adjacent veins. 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 (17 Oct 2024 03:02)    ID consulted for workup and antibiotic management         PAST MEDICAL & SURGICAL HISTORY:  HTN (Hypertension)  Renal Calculi  H/O: Osteoarthritis  Recurrent UTI  Prediabetes  Pulmonary fibrosis      Allergies  No Known Allergies      ANTIMICROBIALS:  cefTRIAXone   IVPB 1000 every 24 hours    MEDICATIONS  (STANDING):  cefTRIAXone   IVPB   100 mL/Hr IV Intermittent (10-16-24 @ 20:49)    cefTRIAXone   IVPB   100 mL/Hr IV Intermittent (10-17-24 @ 18:27)      OTHER MEDS: MEDICATIONS  (STANDING):  acetaminophen     Tablet .. 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 daughter   Needs assistance with most ADL's      Smoking Cigarettes [ ]Active [x] Former [ ]Denies   ETOH [x]denies [ ]Former [ ]Current Use denies   Drug Use [x]Never [ ] Former [ ] Active     FAMILY HISTORY:  Heart attack (mother)    REVIEW OF SYSTEMS  [  ] ROS unobtainable because:    [ x ] All other systems negative except as noted below:	    Constitutional:  [ x ] fever [ ] chills  [ ] weight loss  [ ] weakness  Skin:  [ ] rash [ ] phlebitis	  Eyes: [ ] icterus [ ] pain  [ ] discharge	  ENMT: [ ] sore throat  [ ] thrush [ ] ulcers [ ] exudates  Respiratory: [ ] dyspnea [ ] hemoptysis [ ] cough [ ] sputum	  Cardiovascular:  [ ] chest pain [ ] palpitations [ ] edema	  Gastrointestinal:  [ ] nausea [ ] vomiting [ ] diarrhea [ ] constipation [ ] pain	  Genitourinary:  [ ] dysuria [ ] frequency [ ] hematuria [ ] discharge [ ] flank pain  [ ] incontinence  Musculoskeletal:  [ ] myalgias [ ] arthralgias [ ] arthritis  [x ] back pain  Neurological:  [ ] headache [ ] seizures  [x ] confusion/altered mental status  Psychiatric:  [ ] anxiety [ ] depression	  Hematology/Lymphatics:  [ ] lymphadenopathy  Endocrine:  [ ] adrenal [ ] thyroid  Allergic/Immunologic:	 [ ] transplant [ ] seasonal    Vital Signs Last 24 Hrs  T(F): 98.7 (10-18-24 @ 08:54), Max: 103.2 (10-17-24 @ 05:00)    Vital Signs Last 24 Hrs  HR: 121 (10-18-24 @ 05:02) (111 - 121)  BP: 156/71 (10-18-24 @ 05:02) (127/68 - 156/71)  RR: 18 (10-18-24 @ 05:02)  SpO2: 98% (10-18-24 @ 05:02) (96% - 100%)  Wt(kg): --    PHYSICAL EXAM:  Constitutional: Pleasant elderly woman, non-toxic, no distress  HEAD/EYES: anicteric, no conjunctival injection  ENT:  supple, no thrush  Cardiovascular:   normal S1, S2, no murmur, no edema  Respiratory:  clear BS bilaterally, no wheezes, no rales  GI:  soft, non-tender, normal bowel sounds  :  no ricci, no CVA tenderness  Musculoskeletal:  no synovitis, normal ROM  Neurologic: awake and alert, normal strength, no focal findings  Skin:  no rash, no erythema, no phlebitis  Heme/Onc: no lymphadenopathy   Psychiatric:  awake, alert, appropriate mood        WBC  WBC Count: 22.18 K/uL (10-18 @ 06:30)  WBC Count: 23.30 K/uL (10-17 @ 06:50)  WBC Count: 20.38 K/uL (10-16 @ 20:21)      Auto Neutrophil %: 93.7 % *H* (10-16-24 @ 20:21)  Auto Neutrophil #: 19.08 K/uL *H* (10-16-24 @ 20:21)    CBC                        11.8   22.18 )-----------( 171      ( 18 Oct 2024 06:30 )             37.0     BMP/CMP  10-18    141  |  107  |  29[H]  ----------------------------<  126[H]  3.8   |  26  |  1.15    Ca    10.7[H]      18 Oct 2024 06:30  Phos  1.7     10-17  Mg     2.1     10-17    TPro  7.0  /  Alb  2.4[L]  /  TBili  0.5  /  DBili  x   /  AST  77[H]  /  ALT  47  /  AlkPhos  194[H]  10-18    Creatinine Trend  Creatinine Trend: 1.15<--, 1.49<--, 1.78<--        Lactate, Blood: 1.9 mmol/L (10-17-24 @ 06:50)  Lactate, Blood: 1.6 mmol/L (10-17-24 @ 00:35)  Blood Gas Venous - Lactate: 2.40 mmol/L (10-16-24 @ 20:42)  Lactate, Blood: 2.1 mmol/L (10-16-24 @ 20:21)        Urinalysis with Reflex Culture (10.17.24 @ 02:15)   Urine Appearance: Cloudy   Color: Dark Yellow   Specific Gravity: 1.019   pH Urine: 5.5   Protein, Urine: 100 mg/dL   Glucose Qualitative, Urine: Negative mg/dL   Ketone - Urine: Trace mg/dL   Blood, Urine: Large   Bilirubin: Negative   Urobilinogen: 1.0 mg/dL   Leukocyte Esterase Concentration: Moderate   Nitrite: Positive   Urine Microscopic-Add On (NC) (10.17.24 @ 02:15)   Squamous Epithelial Cells: Present   Bacteria: Moderate /HPF   Red Blood Cell - Urine: 25 /HPF   White Blood Cell - Urine: >50 /HPF        MICROBIOLOGY:  .Blood BLOOD  10-16-24   Growth in anaerobic bottle: Gram Negative Rods  Growth in aerobic bottle: Gram Negative Rods  --  Blood Culture PCR  -  Escherichia coli: Detec (10.16.24 @ 20:21)        RADIOLOGY:      ACC: [id number] EXAM:  XR CHEST PORTABLE URGENT 1V   ORDERED BY: NYA RODRIGUEZ     PROCEDURE DATE:  10/16/2024          INTERPRETATION:  Portable AP chest radiograph    COMPARISON: 5/9/2024 chest x-ray.    CLINICAL INFORMATION: Sepsis.    FINDINGS:  CATHETERS AND TUBES: None    PULMONARY:  No airspace consolidations or radiographic evidence of pulmonary nodules..  No pleural effusion or pneumothorax.    HEART/VASCULAR: The  heart is mildly enlarged in transverse diameter. No   hilar mass.  .    BONES: The visualized osseous thorax is intact.    IMPRESSION:   No radiographic evidence of active chest disease..  Please see same day chest CT report.    --- End of Report ---        BREONNA ROSSI MD  This document has been electronically signed. Oct 17 2024  1:05PM    ACC: [id number] EXAM:  CT LUMBAR SPINE   ORDERED BY: NYA RODRIGUEZ     ACC: [id number] EXAM:  CT CHEST   ORDERED BY: NYA RODRIGUEZ     ACC: [id number] EXAM:  CT THORACIC SPINE   ORDERED BY: NYA RODRIGUEZ     ACC: [id number] EXAM:  CT ABDOMEN AND PELVIS   ORDERED BY: NYA RODRIGUEZ     PROCEDURE DATE:  10/16/2024          INTERPRETATION:  CLINICAL INFORMATION: 84F [place] speaking PMH HTN, HLD,   pre-DM, CAD s/p PCI, hx frequent UTIs / renal stones, dementia BIBEMS   accompanied by daughter d/t unresponsive episode this evening. T12   fracture one month ago.    COMPARISON: CT chest 9/30/2019.    CONTRAST/COMPLICATIONS:  IV Contrast: None.  Oral Contrast: None.  Complications: None documented.    PROCEDURE:  CT of the Chest, Abdomen and Pelvis and thoracolumbar spine was performed.  Sagittal and coronal reformats were performed.    FINDINGS:  CHEST:  LUNGS AND LARGE AIRWAYS: Trachea and main bronchi patent. Mucus plugging   of several right middle lobe and bilateral lower lobe segmental and   subsegmental bronchi. Bilateral subpleural interstitial prominence, mild   but increased compared to the previous study. Dependent atelectatic   changes.  PLEURA: No pleural effusion.  VESSELS: Calcific atherosclerosis including of the coronary arteries.   Main pulmonary artery mildly dilated 3.7 cm diameter.  HEART: Heart size is normal. No pericardial effusion.  MEDIASTINUM AND TODD: Mildly prominent right paratracheal and subcarinal   lymph nodes are similar to prior.  CHEST WALL AND LOWER NECK: Within normal limits.    ABDOMEN AND PELVIS:  LIVER: Within normal limits.  BILE DUCTS: Normal caliber.  GALLBLADDER: Linear calcifications and soft tissue density are difficult   to separate from the gallbladder wall.  SPLEEN: Within normal limits.  PANCREAS: Within normal limits.  ADRENALS: Within normal limits.  KIDNEYS/URETERS: No hydronephrosis. Small non obstructing stone lower pole   right kidney, with scattered right renal cortical calcification is and   scarring. Mild renal cortical volume loss bilaterally.    BLADDER: Air within the urinary bladder wall and within several veins   adjacent to the vagina, urinary bladder, and adnexa.  REPRODUCTIVE ORGANS: Several small uterine fibroids.    BOWEL: No bowel obstruction. Mid appendix mildly dilated with fluid (1.1   cm diameter on coronal image 44) but tapers normally, no adjacent   inflammation. Small hiatal hernia contains a portion of the gastric   fundus. Small noninflamed proximal duodenal diverticula. Left colonic   diverticulosis,no diverticulitis.  PERITONEUM/RETROPERITONEUM: Within normal limits.  VESSELS: Atherosclerotic changes. No abdominal aortic aneurysm.  LYMPH NODES: No lymphadenopathy.  ABDOMINAL WALL: Within normal limits.  BONES: Please see below regarding the vertebrae. No other fracture. No   aggressive osseous lesions.    THORACOLUMBAR SPINE:  VERTEBRAE: Late subacute to chronic appearing moderate compression   fracture of the T12 vertebral body, with retropulsion but only mild   spinal canal narrowing. No other fracture. Alignment otherwise anatomic.  SPINAL CANAL AND FORAMINA: Multilevel degenerative changes with no   apparent high grade spinal canal narrowing.  PARASPINAL SOFT TISSUES: No paravertebral hematoma or acute finding.    IMPRESSION:    CHEST:  Subpleural interstitial prominence in the bilateral lungs, mild but   increased compared to 2019. This could represent interval progression of   chronic interstitial lung disease, or mild acute interstitial pulmonary   edema, or atypical pneumonia.    Mucus plugging of several right middle lobe and bilateral lower lobe   segmental and subsegmental bronchi.    Coronary artery atherosclerosis.    ABDOMEN/PELVIS:  Air within the wall of the urinary bladder, and extending into adjacent   veins. 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   Recommend urinalysis. If there was recent instrumentation, this could   potentially also cause this finding.    The mid appendix is mildly dilated with fluid. There is no adjacent   inflammation and this is probably incidental. However early acute   appendicitis, while not likely, cannot be entirely excluded.    Linear gallbladder calcifications could represent calcifications within   the wall or partially calcified gallstones. There may be gallbladder wall   thickening, not well evaluated. Recommend gallbladder ultrasound.    THORACOLUMBAR SPINE:  No acute fracture. Late subacute to chronic moderate compression fracture   of T12.    Other chronic findings as above.      --- End of Report ---        I have personally reviewed the above imaging

## 2024-10-18 NOTE — PHYSICAL THERAPY INITIAL EVALUATION ADULT - STRENGTHENING, PT EVAL
Pt will improve muscle strength in all extremities to WFL in 1 to 2 weeks to perform Gait with min A x1

## 2024-10-18 NOTE — PHYSICAL THERAPY INITIAL EVALUATION ADULT - TRANSFER TRAINING, PT EVAL
Pt will be able to perform sit to stand, stand pivot transfer using [RW] with CGA in 2 weeks to improve functional transfers between any 2 surfaces

## 2024-10-18 NOTE — PROGRESS NOTE ADULT - SUBJECTIVE AND OBJECTIVE BOX
Patient is a 84y old  Female who presents with a chief complaint of AMS & Sepsis Secondary  to UTI (17 Oct 2024 03:02)    INTERVAL HPI/OVERNIGHT EVENTS: Patients seen and examined at bedside this morning. No acute events overnight. Pt reports    MEDICATIONS  (STANDING):  cefTRIAXone   IVPB 1000 milliGRAM(s) IV Intermittent every 24 hours  heparin   Injectable 5000 Unit(s) SubCutaneous every 8 hours  nystatin Ointment 1 Application(s) Topical every 12 hours  triamcinolone 0.1% Ointment 1 Application(s) Topical every 12 hours    MEDICATIONS  (PRN):  acetaminophen     Tablet .. 650 milliGRAM(s) Oral every 6 hours PRN Temp greater or equal to 38C (100.4F), Mild Pain (1 - 3)  aluminum hydroxide/magnesium hydroxide/simethicone Suspension 30 milliLiter(s) Oral every 4 hours PRN Dyspepsia  melatonin 3 milliGRAM(s) Oral at bedtime PRN Insomnia  ondansetron Injectable 4 milliGRAM(s) IV Push every 8 hours PRN Nausea and/or Vomiting    Allergies    No Known Allergies    Intolerances      REVIEW OF SYSTEMS:  All other systems reviewed and are negative    Vital Signs Last 24 Hrs  T(C): 37.1 (18 Oct 2024 08:54), Max: 38 (17 Oct 2024 18:47)  T(F): 98.7 (18 Oct 2024 08:54), Max: 100.4 (17 Oct 2024 18:47)  HR: 121 (18 Oct 2024 05:02) (111 - 121)  BP: 156/71 (18 Oct 2024 05:02) (127/68 - 156/71)  BP(mean): --  RR: 18 (18 Oct 2024 05:02) (18 - 25)  SpO2: 98% (18 Oct 2024 05:02) (96% - 100%)    Parameters below as of 17 Oct 2024 19:49  Patient On (Oxygen Delivery Method): nasal cannula  O2 Flow (L/min): 3    Daily     Daily Weight in k (18 Oct 2024 05:02)  I&O's Summary    17 Oct 2024 07:01  -  18 Oct 2024 07:00  --------------------------------------------------------  IN: 0 mL / OUT: 300 mL / NET: -300 mL    18 Oct 2024 07:01  -  18 Oct 2024 09:56  --------------------------------------------------------  IN: 220 mL / OUT: 0 mL / NET: 220 mL      CAPILLARY BLOOD GLUCOSE        PHYSICAL EXAM:  GENERAL: NAD, comfortable in bed   HEAD:  Atraumatic, Normocephalic  EYES: EOMI, PERRL, conjunctiva and sclera clear  NECK: Supple, No JVD  LUNG: Clear to auscultation bilaterally; No wheezes, rales or rhonchi  HEART: Regular rate and rhythm; No murmurs, rubs, or gallops  ABDOMEN: Soft, Nontender, Nondistended; Normal Bowel sounds   EXTREMITIES:  No clubbing, cyanosis, or edema  PSYCH: normal mood and affect  NEUROLOGY: AAOx3, non-focal  Skin - Erythematous, tender area around the gluteal fold, into the vaginal area.      Labs                          11.8   22.18 )-----------( 171      ( 18 Oct 2024 06:30 )             37.0     10-18    141  |  107  |  29[H]  ----------------------------<  126[H]  3.8   |  26  |  1.15    Ca    10.7[H]      18 Oct 2024 06:30  Phos  1.7     10-17  Mg     2.1     10-17    TPro  7.0  /  Alb  2.4[L]  /  TBili  0.5  /  DBili  x   /  AST  77[H]  /  ALT  47  /  AlkPhos  194[H]  10-18    PT/INR - ( 16 Oct 2024 20:21 )   PT: 13.7 sec;   INR: 1.21 ratio         PTT - ( 16 Oct 2024 20:21 )  PTT:26.3 sec      Urinalysis Basic - ( 18 Oct 2024 06:30 )    Color: x / Appearance: x / SG: x / pH: x  Gluc: 126 mg/dL / Ketone: x  / Bili: x / Urobili: x   Blood: x / Protein: x / Nitrite: x   Leuk Esterase: x / RBC: x / WBC x   Sq Epi: x / Non Sq Epi: x / Bacteria: x        Urinalysis with Rflx Culture (collected 17 Oct 2024 02:15)    Culture - Blood (collected 16 Oct 2024 20:21)  Source: .Blood BLOOD  Gram Stain (prelim) (17 Oct 2024 12:25):    Growth in aerobic bottle: Gram Negative Rods    Growth in anaerobic bottle: Gram Negative Rods  Preliminary Report (17 Oct 2024 12:26):    Growth in aerobic bottle: Gram Negative Rods    Growth in anaerobic bottle: Gram Negative Rods    Direct identification is available within approximately 3-5    hours either by Blood Panel Multiplexed PCR or Direct    MALDI-TOF. Details: [email or web address]  Organism: Blood Culture PCR (17 Oct 2024 12:31)  Organism: Blood Culture PCR (17 Oct 2024 12:31)    Culture - Blood (collected 16 Oct 2024 20:21)  Source: .Blood BLOOD  Gram Stain (prelim) (17 Oct 2024 11:38):    Growth in anaerobic bottle: Gram Negative Rods    Growth in aerobic bottle: Gram Negative Rods  Preliminary Report (17 Oct 2024 11:38):    Growth in anaerobic bottle: Gram Negative Rods    Growth in aerobic bottle: Gram Negative Rods                    Radiology and Imaging reviewed. Patient is a 84y old  Female who presents with a chief complaint of AMS & Sepsis Secondary  to UTI (17 Oct 2024 03:02)    INTERVAL HPI/OVERNIGHT EVENTS: Patients seen and examined at bedside this morning. Max tmax of 100.4F.     MEDICATIONS  (STANDING):  cefTRIAXone   IVPB 1000 milliGRAM(s) IV Intermittent every 24 hours  heparin   Injectable 5000 Unit(s) SubCutaneous every 8 hours  nystatin Ointment 1 Application(s) Topical every 12 hours  triamcinolone 0.1% Ointment 1 Application(s) Topical every 12 hours    MEDICATIONS  (PRN):  acetaminophen     Tablet .. 650 milliGRAM(s) Oral every 6 hours PRN Temp greater or equal to 38C (100.4F), Mild Pain (1 - 3)  aluminum hydroxide/magnesium hydroxide/simethicone Suspension 30 milliLiter(s) Oral every 4 hours PRN Dyspepsia  melatonin 3 milliGRAM(s) Oral at bedtime PRN Insomnia  ondansetron Injectable 4 milliGRAM(s) IV Push every 8 hours PRN Nausea and/or Vomiting    Allergies    No Known Allergies    Intolerances      REVIEW OF SYSTEMS:  All other systems reviewed and are negative    Vital Signs Last 24 Hrs  T(C): 37.1 (18 Oct 2024 08:54), Max: 38 (17 Oct 2024 18:47)  T(F): 98.7 (18 Oct 2024 08:54), Max: 100.4 (17 Oct 2024 18:47)  HR: 121 (18 Oct 2024 05:02) (111 - 121)  BP: 156/71 (18 Oct 2024 05:02) (127/68 - 156/71)  BP(mean): --  RR: 18 (18 Oct 2024 05:02) (18 - 25)  SpO2: 98% (18 Oct 2024 05:02) (96% - 100%)    Parameters below as of 17 Oct 2024 19:49  Patient On (Oxygen Delivery Method): nasal cannula  O2 Flow (L/min): 3    Daily     Daily Weight in k (18 Oct 2024 05:02)  I&O's Summary    17 Oct 2024 07:01  -  18 Oct 2024 07:00  --------------------------------------------------------  IN: 0 mL / OUT: 300 mL / NET: -300 mL    18 Oct 2024 07:01  -  18 Oct 2024 09:56  --------------------------------------------------------  IN: 220 mL / OUT: 0 mL / NET: 220 mL      CAPILLARY BLOOD GLUCOSE        PHYSICAL EXAM:  GENERAL: NAD, comfortable in bed   HEAD:  Atraumatic, Normocephalic  EYES: EOMI, PERRL, conjunctiva and sclera clear  NECK: Supple, No JVD  LUNG: Clear to auscultation bilaterally; No wheezes, rales or rhonchi  HEART: Regular rate and rhythm; No murmurs, rubs, or gallops  ABDOMEN: Soft, Nontender, Nondistended; Normal Bowel sounds   EXTREMITIES:  No clubbing, cyanosis, or edema  PSYCH: normal mood and affect  NEUROLOGY: AAOx3, non-focal  Skin - Erythematous, tender area around the gluteal fold, into the vaginal area.      Labs                          11.8   22.18 )-----------( 171      ( 18 Oct 2024 06:30 )             37.0     10-18    141  |  107  |  29[H]  ----------------------------<  126[H]  3.8   |  26  |  1.15    Ca    10.7[H]      18 Oct 2024 06:30  Phos  1.7     10-17  Mg     2.1     10-17    TPro  7.0  /  Alb  2.4[L]  /  TBili  0.5  /  DBili  x   /  AST  77[H]  /  ALT  47  /  AlkPhos  194[H]  10-18    PT/INR - ( 16 Oct 2024 20:21 )   PT: 13.7 sec;   INR: 1.21 ratio         PTT - ( 16 Oct 2024 20:21 )  PTT:26.3 sec      Urinalysis Basic - ( 18 Oct 2024 06:30 )    Color: x / Appearance: x / SG: x / pH: x  Gluc: 126 mg/dL / Ketone: x  / Bili: x / Urobili: x   Blood: x / Protein: x / Nitrite: x   Leuk Esterase: x / RBC: x / WBC x   Sq Epi: x / Non Sq Epi: x / Bacteria: x        Urinalysis with Rflx Culture (collected 17 Oct 2024 02:15)    Culture - Blood (collected 16 Oct 2024 20:21)  Source: .Blood BLOOD  Gram Stain (prelim) (17 Oct 2024 12:25):    Growth in aerobic bottle: Gram Negative Rods    Growth in anaerobic bottle: Gram Negative Rods  Preliminary Report (17 Oct 2024 12:26):    Growth in aerobic bottle: Gram Negative Rods    Growth in anaerobic bottle: Gram Negative Rods    Direct identification is available within approximately 3-5    hours either by Blood Panel Multiplexed PCR or Direct    MALDI-TOF. Details: [email or web address]  Organism: Blood Culture PCR (17 Oct 2024 12:31)  Organism: Blood Culture PCR (17 Oct 2024 12:31)    Culture - Blood (collected 16 Oct 2024 20:21)  Source: .Blood BLOOD  Gram Stain (prelim) (17 Oct 2024 11:38):    Growth in anaerobic bottle: Gram Negative Rods    Growth in aerobic bottle: Gram Negative Rods  Preliminary Report (17 Oct 2024 11:38):    Growth in anaerobic bottle: Gram Negative Rods    Growth in aerobic bottle: Gram Negative Rods                    Radiology and Imaging reviewed.

## 2024-10-18 NOTE — PHYSICAL THERAPY INITIAL EVALUATION ADULT - ADDITIONAL COMMENTS
As per pt dtr face to face, pt resides in a house with  3 steps to enter with b/l rails, once inside there are no other steps, she stays on the main level, she uses RW from bed to bathroom which is 5 feet, after having  the T12 fracture, pt needs assist with ambulation, ADLs.

## 2024-10-19 LAB
-  AMPICILLIN/SULBACTAM: SIGNIFICANT CHANGE UP
-  AMPICILLIN: SIGNIFICANT CHANGE UP
-  AZTREONAM: SIGNIFICANT CHANGE UP
-  CEFAZOLIN: SIGNIFICANT CHANGE UP
-  CEFEPIME: SIGNIFICANT CHANGE UP
-  CEFOXITIN: SIGNIFICANT CHANGE UP
-  CEFTRIAXONE: SIGNIFICANT CHANGE UP
-  CIPROFLOXACIN: SIGNIFICANT CHANGE UP
-  ERTAPENEM: SIGNIFICANT CHANGE UP
-  GENTAMICIN: SIGNIFICANT CHANGE UP
-  IMIPENEM: SIGNIFICANT CHANGE UP
-  LEVOFLOXACIN: SIGNIFICANT CHANGE UP
-  MEROPENEM: SIGNIFICANT CHANGE UP
-  PIPERACILLIN/TAZOBACTAM: SIGNIFICANT CHANGE UP
-  TOBRAMYCIN: SIGNIFICANT CHANGE UP
-  TRIMETHOPRIM/SULFAMETHOXAZOLE: SIGNIFICANT CHANGE UP
ALBUMIN SERPL ELPH-MCNC: 2.1 G/DL — LOW (ref 3.3–5)
ALP SERPL-CCNC: 196 U/L — HIGH (ref 40–120)
ALT FLD-CCNC: 48 U/L — SIGNIFICANT CHANGE UP (ref 12–78)
ANION GAP SERPL CALC-SCNC: 5 MMOL/L — SIGNIFICANT CHANGE UP (ref 5–17)
AST SERPL-CCNC: 50 U/L — HIGH (ref 15–37)
BASOPHILS # BLD AUTO: 0.03 K/UL — SIGNIFICANT CHANGE UP (ref 0–0.2)
BASOPHILS NFR BLD AUTO: 0.2 % — SIGNIFICANT CHANGE UP (ref 0–2)
BILIRUB SERPL-MCNC: 0.4 MG/DL — SIGNIFICANT CHANGE UP (ref 0.2–1.2)
BUN SERPL-MCNC: 17 MG/DL — SIGNIFICANT CHANGE UP (ref 7–23)
CALCIUM SERPL-MCNC: 10.9 MG/DL — HIGH (ref 8.5–10.1)
CHLORIDE SERPL-SCNC: 104 MMOL/L — SIGNIFICANT CHANGE UP (ref 96–108)
CO2 SERPL-SCNC: 29 MMOL/L — SIGNIFICANT CHANGE UP (ref 22–31)
CREAT SERPL-MCNC: 0.89 MG/DL — SIGNIFICANT CHANGE UP (ref 0.5–1.3)
CULTURE RESULTS: ABNORMAL
CULTURE RESULTS: ABNORMAL
EGFR: 64 ML/MIN/1.73M2 — SIGNIFICANT CHANGE UP
EOSINOPHIL # BLD AUTO: 0.36 K/UL — SIGNIFICANT CHANGE UP (ref 0–0.5)
EOSINOPHIL NFR BLD AUTO: 2.5 % — SIGNIFICANT CHANGE UP (ref 0–6)
GLUCOSE SERPL-MCNC: 126 MG/DL — HIGH (ref 70–99)
GRAM STN FLD: ABNORMAL
GRAM STN FLD: ABNORMAL
HCT VFR BLD CALC: 34.3 % — LOW (ref 34.5–45)
HGB BLD-MCNC: 10.7 G/DL — LOW (ref 11.5–15.5)
IMM GRANULOCYTES NFR BLD AUTO: 0.5 % — SIGNIFICANT CHANGE UP (ref 0–0.9)
LYMPHOCYTES # BLD AUTO: 1.5 K/UL — SIGNIFICANT CHANGE UP (ref 1–3.3)
LYMPHOCYTES # BLD AUTO: 10.2 % — LOW (ref 13–44)
MAGNESIUM SERPL-MCNC: 1.6 MG/DL — SIGNIFICANT CHANGE UP (ref 1.6–2.6)
MCHC RBC-ENTMCNC: 27.8 PG — SIGNIFICANT CHANGE UP (ref 27–34)
MCHC RBC-ENTMCNC: 31.2 G/DL — LOW (ref 32–36)
MCV RBC AUTO: 89.1 FL — SIGNIFICANT CHANGE UP (ref 80–100)
METHOD TYPE: SIGNIFICANT CHANGE UP
MONOCYTES # BLD AUTO: 1.18 K/UL — HIGH (ref 0–0.9)
MONOCYTES NFR BLD AUTO: 8 % — SIGNIFICANT CHANGE UP (ref 2–14)
NEUTROPHILS # BLD AUTO: 11.53 K/UL — HIGH (ref 1.8–7.4)
NEUTROPHILS NFR BLD AUTO: 78.6 % — HIGH (ref 43–77)
NRBC # BLD: 0 /100 WBCS — SIGNIFICANT CHANGE UP (ref 0–0)
ORGANISM # SPEC MICROSCOPIC CNT: ABNORMAL
ORGANISM # SPEC MICROSCOPIC CNT: ABNORMAL
ORGANISM # SPEC MICROSCOPIC CNT: SIGNIFICANT CHANGE UP
PHOSPHATE SERPL-MCNC: 1.7 MG/DL — LOW (ref 2.5–4.5)
PLATELET # BLD AUTO: 174 K/UL — SIGNIFICANT CHANGE UP (ref 150–400)
POTASSIUM SERPL-MCNC: 3.4 MMOL/L — LOW (ref 3.5–5.3)
POTASSIUM SERPL-SCNC: 3.4 MMOL/L — LOW (ref 3.5–5.3)
PROT SERPL-MCNC: 6.3 GM/DL — SIGNIFICANT CHANGE UP (ref 6–8.3)
RBC # BLD: 3.85 M/UL — SIGNIFICANT CHANGE UP (ref 3.8–5.2)
RBC # FLD: 15.7 % — HIGH (ref 10.3–14.5)
SODIUM SERPL-SCNC: 138 MMOL/L — SIGNIFICANT CHANGE UP (ref 135–145)
SPECIMEN SOURCE: SIGNIFICANT CHANGE UP
SPECIMEN SOURCE: SIGNIFICANT CHANGE UP
WBC # BLD: 14.67 K/UL — HIGH (ref 3.8–10.5)
WBC # FLD AUTO: 14.67 K/UL — HIGH (ref 3.8–10.5)

## 2024-10-19 PROCEDURE — 99232 SBSQ HOSP IP/OBS MODERATE 35: CPT

## 2024-10-19 RX ORDER — METOPROLOL TARTRATE 50 MG
25 TABLET ORAL DAILY
Refills: 0 | Status: DISCONTINUED | OUTPATIENT
Start: 2024-10-19 | End: 2024-10-22

## 2024-10-19 RX ORDER — POTASSIUM CHLORIDE 10 MEQ
40 TABLET, EXTENDED RELEASE ORAL ONCE
Refills: 0 | Status: COMPLETED | OUTPATIENT
Start: 2024-10-19 | End: 2024-10-19

## 2024-10-19 RX ORDER — DIBASIC SODIUM PHOSPHATE, MONOBASIC POTASSIUM PHOSPHATE AND MONOBASIC SODIUM PHOSPHATE 852; 155; 130 MG/1; MG/1; MG/1
1 TABLET ORAL THREE TIMES A DAY
Refills: 0 | Status: COMPLETED | OUTPATIENT
Start: 2024-10-19 | End: 2024-10-20

## 2024-10-19 RX ADMIN — Medication 100 MILLIGRAM(S): at 14:52

## 2024-10-19 RX ADMIN — HEPARIN SODIUM 5000 UNIT(S): 10000 INJECTION INTRAVENOUS; SUBCUTANEOUS at 14:48

## 2024-10-19 RX ADMIN — Medication 1 APPLICATION(S): at 18:02

## 2024-10-19 RX ADMIN — DIBASIC SODIUM PHOSPHATE, MONOBASIC POTASSIUM PHOSPHATE AND MONOBASIC SODIUM PHOSPHATE 1 TABLET(S): 852; 155; 130 TABLET ORAL at 21:19

## 2024-10-19 RX ADMIN — HEPARIN SODIUM 5000 UNIT(S): 10000 INJECTION INTRAVENOUS; SUBCUTANEOUS at 21:19

## 2024-10-19 RX ADMIN — HEPARIN SODIUM 5000 UNIT(S): 10000 INJECTION INTRAVENOUS; SUBCUTANEOUS at 05:05

## 2024-10-19 RX ADMIN — Medication 1 APPLICATION(S): at 05:05

## 2024-10-19 RX ADMIN — Medication 40 MILLIEQUIVALENT(S): at 11:36

## 2024-10-19 RX ADMIN — NYSTATIN 1 APPLICATION(S): 100000 POWDER TOPICAL at 18:02

## 2024-10-19 RX ADMIN — Medication 25 MILLIGRAM(S): at 11:36

## 2024-10-19 RX ADMIN — NYSTATIN 1 APPLICATION(S): 100000 POWDER TOPICAL at 05:05

## 2024-10-19 RX ADMIN — DIBASIC SODIUM PHOSPHATE, MONOBASIC POTASSIUM PHOSPHATE AND MONOBASIC SODIUM PHOSPHATE 1 TABLET(S): 852; 155; 130 TABLET ORAL at 14:49

## 2024-10-19 RX ADMIN — Medication 650 MILLIGRAM(S): at 10:01

## 2024-10-19 NOTE — DIETITIAN INITIAL EVALUATION ADULT - REASON INDICATOR FOR ASSESSMENT
Pt seen for nutrition consult for pressure injury stage II or greater- no pressure injury as per flow sheets

## 2024-10-19 NOTE — DIETITIAN INITIAL EVALUATION ADULT - PERTINENT LABORATORY DATA
10-19    138  |  104  |  17  ----------------------------<  126[H]  3.4[L]   |  29  |  0.89    Ca    10.9[H]      19 Oct 2024 07:58  Phos  1.7     10-19  Mg     1.6     10-19    TPro  6.3  /  Alb  2.1[L]  /  TBili  0.4  /  DBili  x   /  AST  50[H]  /  ALT  48  /  AlkPhos  196[H]  10-19  A1C with Estimated Average Glucose Result: 6.6 % (10-18-24 @ 06:30)

## 2024-10-19 NOTE — DIETITIAN INITIAL EVALUATION ADULT - OTHER INFO
Pt Lithuanian speaking; pt preferred family at bedside for translation. Denies difficulty chewing/swallowing. Reports some intended weight gain over time.   Amenable to Ensure Plus High Protein x 1/day (provides 350 kcal, 20 g protein); discussed use of nutritional supplement to optimize PO intake.  Preferences taken and relayed to diet office. Made aware RD remains available.

## 2024-10-19 NOTE — DIETITIAN INITIAL EVALUATION ADULT - ENTER FROM (G/KG)
RN called [facility] in [facility] at [phone] and spoke with pharmacy staff. Staff reported that there is one refill on file for the patient for Suboxone.     RN called the patient at  [phone] to let him know that he has a refill on file. He does not need the refill yet, he just wanted to call to make sure there was a refill available.     GINGER RICHARD RN on 8/16/2023 at 12:22 PM     1

## 2024-10-19 NOTE — PROGRESS NOTE ADULT - SUBJECTIVE AND OBJECTIVE BOX
Patient is a 84y old  Female who presents with a chief complaint of SEPSIS; AMS     (19 Oct 2024 13:46)    INTERVAL HPI/OVERNIGHT EVENTS: Patients seen and examined at bedside this morning. No acute events overnight. Pt reports back pain.     MEDICATIONS  (STANDING):  cefTRIAXone   IVPB 2000 milliGRAM(s) IV Intermittent every 24 hours  heparin   Injectable 5000 Unit(s) SubCutaneous every 8 hours  metoprolol succinate ER 25 milliGRAM(s) Oral daily  nystatin Ointment 1 Application(s) Topical every 12 hours  potassium phosphate / sodium phosphate Tablet (K-PHOS No. 2) 1 Tablet(s) Oral three times a day  triamcinolone 0.1% Ointment 1 Application(s) Topical every 12 hours    MEDICATIONS  (PRN):  acetaminophen     Tablet .. 650 milliGRAM(s) Oral every 6 hours PRN Temp greater or equal to 38C (100.4F), Mild Pain (1 - 3)  aluminum hydroxide/magnesium hydroxide/simethicone Suspension 30 milliLiter(s) Oral every 4 hours PRN Dyspepsia  melatonin 3 milliGRAM(s) Oral at bedtime PRN Insomnia  ondansetron Injectable 4 milliGRAM(s) IV Push every 8 hours PRN Nausea and/or Vomiting    Allergies    No Known Allergies    Intolerances      REVIEW OF SYSTEMS:  All other systems reviewed and are negative    Vital Signs Last 24 Hrs  T(C): 36.3 (19 Oct 2024 11:05), Max: 37.8 (18 Oct 2024 16:54)  T(F): 97.4 (19 Oct 2024 11:05), Max: 100.1 (18 Oct 2024 16:54)  HR: 120 (19 Oct 2024 11:05) (114 - 122)  BP: 123/71 (19 Oct 2024 11:05) (123/71 - 150/70)  BP(mean): --  RR: 18 (19 Oct 2024 11:05) (18 - 18)  SpO2: 100% (19 Oct 2024 11:05) (94% - 100%)    Parameters below as of 19 Oct 2024 11:05  Patient On (Oxygen Delivery Method): nasal cannula  O2 Flow (L/min): 3    Daily     Daily   I&O's Summary    18 Oct 2024 07:01  -  19 Oct 2024 07:00  --------------------------------------------------------  IN: 420 mL / OUT: 0 mL / NET: 420 mL      CAPILLARY BLOOD GLUCOSE        PHYSICAL EXAM:  GENERAL: NAD, comfortable in bed   HEAD:  Atraumatic, Normocephalic  EYES: EOMI, PERRL, conjunctiva and sclera clear  NECK: Supple, No JVD  LUNG: Clear to auscultation bilaterally; No wheezes, rales or rhonchi  HEART: Regular rate and rhythm; No murmurs, rubs, or gallops  ABDOMEN: Soft, Nontender, Nondistended; Normal Bowel sounds   EXTREMITIES:  No clubbing, cyanosis, or edema  PSYCH: normal mood and affect  NEUROLOGY: AAOx3, non-focal  Skin - Erythematous, tender area around the gluteal fold, into the vaginal area.      Labs                          10.7   14.67 )-----------( 174      ( 19 Oct 2024 07:58 )             34.3     10-19    138  |  104  |  17  ----------------------------<  126[H]  3.4[L]   |  29  |  0.89    Ca    10.9[H]      19 Oct 2024 07:58  Phos  1.7     10-19  Mg     1.6     10-19    TPro  6.3  /  Alb  2.1[L]  /  TBili  0.4  /  DBili  x   /  AST  50[H]  /  ALT  48  /  AlkPhos  196[H]  10-19          Urinalysis Basic - ( 19 Oct 2024 07:58 )    Color: x / Appearance: x / SG: x / pH: x  Gluc: 126 mg/dL / Ketone: x  / Bili: x / Urobili: x   Blood: x / Protein: x / Nitrite: x   Leuk Esterase: x / RBC: x / WBC x   Sq Epi: x / Non Sq Epi: x / Bacteria: x        Culture - Urine (collected 17 Oct 2024 02:15)  Source: Catheterized  Preliminary Report (19 Oct 2024 07:42):    10,000 - 49,000 CFU/mL Escherichia coli    <10,000 CFU/ml Normal Urogenital rober present    Urinalysis with Rflx Culture (collected 17 Oct 2024 02:15)    Culture - Blood (collected 16 Oct 2024 20:21)  Source: .Blood BLOOD  Gram Stain (19 Oct 2024 06:57):    Growth in aerobic bottle: Gram Negative Rods    Growth in anaerobic bottle: Gram Negative Rods  Final Report (19 Oct 2024 06:57):    Growth in aerobic and anaerobic bottles: Escherichia coli    Direct identification is available within approximately 3-5    hours either by Blood Panel Multiplexed PCR or Direct    MALDI-TOF. Details: [email or web address]  Organism: Blood Culture PCR  Escherichia coli (19 Oct 2024 06:57)  Organism: Escherichia coli (19 Oct 2024 06:57)  Organism: Blood Culture PCR (19 Oct 2024 06:57)    Culture - Blood (collected 16 Oct 2024 20:21)  Source: .Blood BLOOD  Gram Stain (19 Oct 2024 06:58):    Growth in anaerobic bottle: Gram Negative Rods    Growth in aerobic bottle: Gram Negative Rods  Final Report (19 Oct 2024 06:58):    Growth in aerobic and anaerobic bottles: Escherichia coli    See previous culture [id number]                    Radiology and Imaging reviewed.

## 2024-10-19 NOTE — DIETITIAN INITIAL EVALUATION ADULT - DIET TYPE
Progress Note    Red Rule Applied    Service Provided:  I met with Davie and his wife, Keshia, for 62 minutes for family therapy. This visit is being performed via video.    Start Time:  4:12 PM  End Time: 5:14 PM    Clinician Location: [facility] [place] [address] is in [place] and his identity has been established.   He understands that we are limiting office visits due to the coronavirus pandemic and was informed that consent to treat includes permission to submit charges to his insurance. It was also shared that without being seen and evaluated in person, there is a risk that the information and/or assessment may be incomplete or inaccurate.    Relevant History and Session Note:  Davie and Keshia presented today reporting Davie had his procedure this morning and \"it is not looking good\" and they have confirmed that there is Cancer. They reported there were abnormal cells present in the lymph nodes. The reported they are awaiting the final results. They reported the next step is a PET Scan of his whole body and potentially more biopsies. They reported Davie will be referred to a throat surgeon for possible removal, which would involve him losing the ability to speak (most aggressive option) and radiation options. Davie reported pain on the right side of his face and difficulty swallowing.  They reported the doctor was very good and straightforward with them. 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 Davie reported it has been very difficult to wait for answers.      Discussed that this is a trauma for Davie physically and both of them emotionally. Asked them what each of them need from one another as they go through this stress. Davie reported he needs more lobito and patience from Keshia. Keshia reported she feels there has been less communication from Davie recently. Keshia reported she would welcome text messages with things that are  going on. Davie reported feeling that Keshia is getting mad a lot, which makes Davie feel \"scolded\". Davie reported he has been booked back to back to back recently, making it difficult to communicate throughout the day.     Davie reported Denys was selected to potentially go to [employer]. Keshia reported his Mom knew before she knew, which hurt her.     Keshia reported feeling a lot more anxious recently, wanting to know that Davie is safe.     Davie reported he is more of an internal processor, whereas Keshia is more of an external processor. Keshia reported she does not want to put her feelings on Davie right now, causing him to want to care for her, which she does not want to do to him, she wants to take care of him right now. Davie reported he is strong and still wants her to share. Davie reported he will work to set and maintain healthy boundaries.     They reported they have not yet told the boys about what is going on, which stresses Keshia out.     They reported they went to move Thomas's stuff out of college on Mother's Day, and he will be home tomorrow for the summer.     Davie reported he has been getting tired and fatigued much more easily lately and is also having trouble with sleep. Davie reported he has also been having problems with sleep and healing wounds    Discussed that the focus this summer needs to be wellness.    Important Dates:  End of June- Travel to [facility] for 8th Anniversary     Progress relevant to last session: mild decline    Interventions:   · Therapist took Person-centered approach, engaged in empathic listening and promoting positive self-regard    Build therapeutic alliance/ relationship to build trust and allow for healing process to occur in a safe environment  Provided support and encouragement  Cognitive Behavioral Therapy (CBT) to identify and challenge irrational thoughts and negative self talk  CBT: Discussed themes and patterns present within the family in order to raise  awareness and change the patterns  Worked to facilitate communication between the couple  · Supportive Therapy   · Empathy (see situation from other person's perspective)  · Positive Psychology- Discussed that negative comments/ feedback are much heavier than positive, as they represent a \"threat\". Advised that within a family, there needs to be a ratio of about 7 positive comments for every 1 negative comment in order for things to feel loving and balanced  · Next Session- Discuss \"House Rules\" for summer  · Communication- Worked to facilitate open communication between Davie & Keshia as they both process the impact of Davie's Cancer Diagnosis    Assessment:  Mental Status Exam  Davie is a 50 year old White male who appears neat and clean, casually dressed, average size, with gray well groomed hair, blue eyes, and appeared to be stated age.  The following areas were assessed:    Behavior: cooperative and friendly    Eye Contact: appropriate   Speech: logical, coherent and appropriate rate rhythm and volume  Attention:  adequate  Gait, movement and Motor Coordination: within normal limits  Alert and Oriented: Yes, to Person, Place, and Time  Mood/Affect: scared and worried  Thought Process: logical and coherent   Cognitive Performance:  normal limits  Insight and Judgment: within normal limits for age   Self-Harm: denied  Current Suicidal Ideation: denied  Past Suicidal Ideation: denied  Homicidal Ideation: denied    DIAGNOSIS:   Adjustment Disorder with anxiety   Partner relational problem         Treatment Plan    This treatment plan was collaboratively developed with the patient.    Formal treatment Plan Review Due Date (every six months): 8/22/22  (complete during next in-person session)    Treatment Goal(s):   1. -- Davie and Keshia have time and a safe place to gather support and problem solve issues as they arise.   2. -- Improve communication and connection within the marriage.   3. -- Couple is able to  establish and follow through on common goals based upon their shared family values.  4. -- Davie and Keshia feel supported and have tools to help them navigate the dynamics of their blended family and shared parenting responsibilities.   5. --  Improve conflict resolution skills within the family.     Current Outpatient Medications   Medication Sig Dispense Refill   • azelastine (ASTELIN) 0.1 % nasal spray Spray 2 sprays in each nostril in the morning and 2 sprays in the evening. Use in each nostril as directed 90 mL 0   • omeprazole (PriLOSEC) 40 MG capsule TAKE ONE CAPSULE BY MOUTH EVERY DAY 90 capsule 3   • loratadine (CLARITIN) 10 MG tablet Take 10 mg by mouth daily.     • levocetirizine (XYZAL) 5 MG tablet Take 5 mg by mouth every evening.     • fluticasone (FLONASE) 50 MCG/ACT nasal spray  16 g 12   • BIOTIN PO        No current facility-administered medications for this visit.     Plan/ Recommendations:  1. I will continue to follow Davie regularly to provide treatment for patient's issues of relationship issues and communication issues.  1. Follow up in 3-4 weeks.  2. Davie should go to the nearest emergency room or call 9-1-1 if he ever reports feelings of suicidality or if he believes he may be a threat to self or others.  Davie will inform Dr. Enamorado or other support if he feels unsafe.   3. Practice/ implement strategies discussed during session.     Thank you for the opportunity to participate in the care of Davie. Please feel free to contact me should you have any questions about my recommendations.    Belle Enamorado PsyD  5/16/2023  Licensed Clinical Psychologist  [place], [place]   Ensure Plus High Protein x 1/day (provides 350 kcal, 20 g protein)/low sodium/regular

## 2024-10-19 NOTE — PROGRESS NOTE ADULT - ASSESSMENT
84 year old [place]inyar[place] speaking female with a PMH of HTN, HLP, Pre-DM, CAD s/p PCI, frequent UTI BIBEMS from home to be evaluated for AMS. As per daughter, patient went to take a nap s/p neurology appointment, they went to wake her up, she was difficult to arose and appeared confused. Upon evaluation, patient AAOx3, back to baseline as per daughter. Endorses she is feeling weak, otherwise no pain or discomfort. Denies chest pain, palpitation, SOB, headache, dizziness, abdominal pain, N/V/D, dysuria, increase urinary frequency/urgency or nay other acute symptoms. In the ED, patient met sepsis criteria with Leukocytosis – WBC: 20.38, Lactic acidosis – Lactate:2.1, Tachycardic: HR-123, Febrile: T-max:101.9, Source: UTI. Troponin 299.5-->305.6. ECG- No ST changes. Cr:1.78. Received 1.5L-NS. Ofirmev, Ceftriaxone.     Problem/Plan - 1:  ·  Problem: At risk for sepsis due to urinary tract infection. E coli bacteremia  ·  Plan: In the ED, patient met sepsis criteria with Leukocytosis – WBC: 20.38, Lactic acidosis – Lactate:2.1, Tachycardic: HR-123, Febrile: T-max:101.9, Source: UTI.  UA: (+) Nitrite, Moderate- Leuk esterase, WBC>50, Moderate Bacteria  CT-Abd: Air within the wall of the urinary bladder and extending into adjacent veins. 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   - Tele  - IV-NS  - Ceftriaxone   - Tylenol for fever   - Culture E coli bacteremia  - DVT Prophylaxis.  (10/19) Repeat blood culture neg. C/W abx. Discharge planning     Problem/Plan - 2:  ·  Problem: Altered mental state.   ·  Plan: Resolved    Problem/Plan - 3:  ·  Problem: MC (acute kidney injury).   ·  Plan: Cr: 1.78  - IV-NS  - Monitor Kidney function   - Avoid nephrotoxic drugs.    Problem/Plan - 4:  ·  Problem: Elevated troponin.   ·  Plan: Troponin 299.5--> 305  Unlikely ischemic.  ECG: Tachycardia - No ST changes   - ASA   - Statin   - Trend troponin   - Heparin SubQ.    Problem/Plan - 5:  ·  Problem: Hypertension.   ·  Plan: Continue home meds   - held HCTZ 12.5mg   - held Met succ 50mg   - Monitor BP.

## 2024-10-19 NOTE — DIETITIAN INITIAL EVALUATION ADULT - PERTINENT MEDS FT
MEDICATIONS  (STANDING):  cefTRIAXone   IVPB 2000 milliGRAM(s) IV Intermittent every 24 hours  heparin   Injectable 5000 Unit(s) SubCutaneous every 8 hours  metoprolol succinate ER 25 milliGRAM(s) Oral daily  nystatin Ointment 1 Application(s) Topical every 12 hours  potassium phosphate / sodium phosphate Tablet (K-PHOS No. 2) 1 Tablet(s) Oral three times a day  triamcinolone 0.1% Ointment 1 Application(s) Topical every 12 hours    MEDICATIONS  (PRN):  acetaminophen     Tablet .. 650 milliGRAM(s) Oral every 6 hours PRN Temp greater or equal to 38C (100.4F), Mild Pain (1 - 3)  aluminum hydroxide/magnesium hydroxide/simethicone Suspension 30 milliLiter(s) Oral every 4 hours PRN Dyspepsia  melatonin 3 milliGRAM(s) Oral at bedtime PRN Insomnia  ondansetron Injectable 4 milliGRAM(s) IV Push every 8 hours PRN Nausea and/or Vomiting

## 2024-10-20 LAB
-  AMOXICILLIN/CLAVULANIC ACID: SIGNIFICANT CHANGE UP
-  AMPICILLIN/SULBACTAM: SIGNIFICANT CHANGE UP
-  AMPICILLIN: SIGNIFICANT CHANGE UP
-  AZTREONAM: SIGNIFICANT CHANGE UP
-  CEFAZOLIN: SIGNIFICANT CHANGE UP
-  CEFEPIME: SIGNIFICANT CHANGE UP
-  CEFOXITIN: SIGNIFICANT CHANGE UP
-  CEFTRIAXONE: SIGNIFICANT CHANGE UP
-  CEFUROXIME: SIGNIFICANT CHANGE UP
-  CIPROFLOXACIN: SIGNIFICANT CHANGE UP
-  ERTAPENEM: SIGNIFICANT CHANGE UP
-  GENTAMICIN: SIGNIFICANT CHANGE UP
-  IMIPENEM: SIGNIFICANT CHANGE UP
-  LEVOFLOXACIN: SIGNIFICANT CHANGE UP
-  MEROPENEM: SIGNIFICANT CHANGE UP
-  NITROFURANTOIN: SIGNIFICANT CHANGE UP
-  PIPERACILLIN/TAZOBACTAM: SIGNIFICANT CHANGE UP
-  TOBRAMYCIN: SIGNIFICANT CHANGE UP
-  TRIMETHOPRIM/SULFAMETHOXAZOLE: SIGNIFICANT CHANGE UP
ALBUMIN SERPL ELPH-MCNC: 2.2 G/DL — LOW (ref 3.3–5)
ALP SERPL-CCNC: 234 U/L — HIGH (ref 40–120)
ALT FLD-CCNC: 72 U/L — SIGNIFICANT CHANGE UP (ref 12–78)
ANION GAP SERPL CALC-SCNC: 5 MMOL/L — SIGNIFICANT CHANGE UP (ref 5–17)
AST SERPL-CCNC: 73 U/L — HIGH (ref 15–37)
BASOPHILS # BLD AUTO: 0.03 K/UL — SIGNIFICANT CHANGE UP (ref 0–0.2)
BASOPHILS NFR BLD AUTO: 0.3 % — SIGNIFICANT CHANGE UP (ref 0–2)
BILIRUB SERPL-MCNC: 0.4 MG/DL — SIGNIFICANT CHANGE UP (ref 0.2–1.2)
BUN SERPL-MCNC: 16 MG/DL — SIGNIFICANT CHANGE UP (ref 7–23)
CALCIUM SERPL-MCNC: 10.9 MG/DL — HIGH (ref 8.5–10.1)
CHLORIDE SERPL-SCNC: 103 MMOL/L — SIGNIFICANT CHANGE UP (ref 96–108)
CO2 SERPL-SCNC: 29 MMOL/L — SIGNIFICANT CHANGE UP (ref 22–31)
CREAT SERPL-MCNC: 0.94 MG/DL — SIGNIFICANT CHANGE UP (ref 0.5–1.3)
CULTURE RESULTS: ABNORMAL
EGFR: 60 ML/MIN/1.73M2 — SIGNIFICANT CHANGE UP
EOSINOPHIL # BLD AUTO: 0.34 K/UL — SIGNIFICANT CHANGE UP (ref 0–0.5)
EOSINOPHIL NFR BLD AUTO: 3.9 % — SIGNIFICANT CHANGE UP (ref 0–6)
GLUCOSE SERPL-MCNC: 145 MG/DL — HIGH (ref 70–99)
HCT VFR BLD CALC: 35.9 % — SIGNIFICANT CHANGE UP (ref 34.5–45)
HGB BLD-MCNC: 11.1 G/DL — LOW (ref 11.5–15.5)
IMM GRANULOCYTES NFR BLD AUTO: 0.6 % — SIGNIFICANT CHANGE UP (ref 0–0.9)
LYMPHOCYTES # BLD AUTO: 1.34 K/UL — SIGNIFICANT CHANGE UP (ref 1–3.3)
LYMPHOCYTES # BLD AUTO: 15.5 % — SIGNIFICANT CHANGE UP (ref 13–44)
MAGNESIUM SERPL-MCNC: 1.5 MG/DL — LOW (ref 1.6–2.6)
MCHC RBC-ENTMCNC: 27.7 PG — SIGNIFICANT CHANGE UP (ref 27–34)
MCHC RBC-ENTMCNC: 30.9 G/DL — LOW (ref 32–36)
MCV RBC AUTO: 89.5 FL — SIGNIFICANT CHANGE UP (ref 80–100)
METHOD TYPE: SIGNIFICANT CHANGE UP
MONOCYTES # BLD AUTO: 1.08 K/UL — HIGH (ref 0–0.9)
MONOCYTES NFR BLD AUTO: 12.5 % — SIGNIFICANT CHANGE UP (ref 2–14)
NEUTROPHILS # BLD AUTO: 5.8 K/UL — SIGNIFICANT CHANGE UP (ref 1.8–7.4)
NEUTROPHILS NFR BLD AUTO: 67.2 % — SIGNIFICANT CHANGE UP (ref 43–77)
NRBC # BLD: 0 /100 WBCS — SIGNIFICANT CHANGE UP (ref 0–0)
ORGANISM # SPEC MICROSCOPIC CNT: ABNORMAL
ORGANISM # SPEC MICROSCOPIC CNT: SIGNIFICANT CHANGE UP
PHOSPHATE SERPL-MCNC: 2.1 MG/DL — LOW (ref 2.5–4.5)
PLATELET # BLD AUTO: 180 K/UL — SIGNIFICANT CHANGE UP (ref 150–400)
POTASSIUM SERPL-MCNC: 4.1 MMOL/L — SIGNIFICANT CHANGE UP (ref 3.5–5.3)
POTASSIUM SERPL-SCNC: 4.1 MMOL/L — SIGNIFICANT CHANGE UP (ref 3.5–5.3)
PROT SERPL-MCNC: 6.6 GM/DL — SIGNIFICANT CHANGE UP (ref 6–8.3)
RBC # BLD: 4.01 M/UL — SIGNIFICANT CHANGE UP (ref 3.8–5.2)
RBC # FLD: 15.7 % — HIGH (ref 10.3–14.5)
SODIUM SERPL-SCNC: 137 MMOL/L — SIGNIFICANT CHANGE UP (ref 135–145)
SPECIMEN SOURCE: SIGNIFICANT CHANGE UP
WBC # BLD: 8.64 K/UL — SIGNIFICANT CHANGE UP (ref 3.8–10.5)
WBC # FLD AUTO: 8.64 K/UL — SIGNIFICANT CHANGE UP (ref 3.8–10.5)

## 2024-10-20 PROCEDURE — 99232 SBSQ HOSP IP/OBS MODERATE 35: CPT

## 2024-10-20 RX ORDER — MAGNESIUM SULFATE IN 0.9% NACL 2 G/50 ML
1 INTRAVENOUS SOLUTION, PIGGYBACK (ML) INTRAVENOUS ONCE
Refills: 0 | Status: COMPLETED | OUTPATIENT
Start: 2024-10-20 | End: 2024-10-20

## 2024-10-20 RX ORDER — SODIUM PHOSPHATE, MONOBASIC, MONOHYDRATE AND SODIUM PHOSPHATE, DIBASIC ANHYDROUS 276; 142 MG/ML; MG/ML
15 INJECTION, SOLUTION INTRAVENOUS ONCE
Refills: 0 | Status: COMPLETED | OUTPATIENT
Start: 2024-10-20 | End: 2024-10-20

## 2024-10-20 RX ADMIN — HEPARIN SODIUM 5000 UNIT(S): 10000 INJECTION INTRAVENOUS; SUBCUTANEOUS at 15:43

## 2024-10-20 RX ADMIN — Medication 100 GRAM(S): at 09:11

## 2024-10-20 RX ADMIN — Medication 650 MILLIGRAM(S): at 22:45

## 2024-10-20 RX ADMIN — Medication 650 MILLIGRAM(S): at 21:44

## 2024-10-20 RX ADMIN — DIBASIC SODIUM PHOSPHATE, MONOBASIC POTASSIUM PHOSPHATE AND MONOBASIC SODIUM PHOSPHATE 1 TABLET(S): 852; 155; 130 TABLET ORAL at 05:31

## 2024-10-20 RX ADMIN — Medication 100 MILLIGRAM(S): at 15:48

## 2024-10-20 RX ADMIN — HEPARIN SODIUM 5000 UNIT(S): 10000 INJECTION INTRAVENOUS; SUBCUTANEOUS at 21:13

## 2024-10-20 RX ADMIN — SODIUM PHOSPHATE, MONOBASIC, MONOHYDRATE AND SODIUM PHOSPHATE, DIBASIC ANHYDROUS 62.5 MILLIMOLE(S): 276; 142 INJECTION, SOLUTION INTRAVENOUS at 11:12

## 2024-10-20 RX ADMIN — Medication 1 APPLICATION(S): at 17:43

## 2024-10-20 RX ADMIN — Medication 25 MILLIGRAM(S): at 05:31

## 2024-10-20 RX ADMIN — HEPARIN SODIUM 5000 UNIT(S): 10000 INJECTION INTRAVENOUS; SUBCUTANEOUS at 05:31

## 2024-10-20 RX ADMIN — Medication 1 APPLICATION(S): at 05:31

## 2024-10-20 RX ADMIN — NYSTATIN 1 APPLICATION(S): 100000 POWDER TOPICAL at 17:43

## 2024-10-20 RX ADMIN — NYSTATIN 1 APPLICATION(S): 100000 POWDER TOPICAL at 05:31

## 2024-10-20 NOTE — PROGRESS NOTE ADULT - ASSESSMENT
84 year old [place] sor[place] speaking female with a PMH of HTN, HLP, Pre-DM, CAD s/p PCI, frequent UTI BIBEMS from home to be evaluated for AMS. As per daughter, patient went to take a nap s/p neurology appointment, they went to wake her up, she was difficult to arose and appeared confused. Upon evaluation, patient AAOx3, back to baseline as per daughter. Endorses she is feeling weak, otherwise no pain or discomfort. Denies chest pain, palpitation, SOB, headache, dizziness, abdominal pain, N/V/D, dysuria, increase urinary frequency/urgency or nay other acute symptoms. In the ED, patient met sepsis criteria with Leukocytosis – WBC: 20.38, Lactic acidosis – Lactate:2.1, Tachycardic: HR-123, Febrile: T-max:101.9, Source: UTI. Troponin 299.5-->305.6. ECG- No ST changes. Cr:1.78. Received 1.5L-NS. Ofirmev, Ceftriaxone.     Problem/Plan - 1:  ·  Problem: At risk for sepsis due to urinary tract infection. E coli bacteremia  ·  Plan: In the ED, patient met sepsis criteria with Leukocytosis – WBC: 20.38, Lactic acidosis – Lactate:2.1, Tachycardic: HR-123, Febrile: T-max:101.9, Source: UTI.  UA: (+) Nitrite, Moderate- Leuk esterase, WBC>50, Moderate Bacteria  CT-Abd: Air within the wall of the urinary bladder and extending into adjacent veins. 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   - Tele  - IV-NS  - Ceftriaxone   - Tylenol for fever   - Culture E coli bacteremia  - DVT Prophylaxis.  (10/19) Repeat blood culture neg. C/W abx. Discharge planning   (10/20) low grade fevers. C/W IV abx    Problem/Plan - 2:  ·  Problem: Altered mental state.   ·  Plan: Resolved    Problem/Plan - 3:  ·  Problem: MC (acute kidney injury).   ·  Plan: Cr: 1.78  - IV-NS  - Monitor Kidney function   - Avoid nephrotoxic drugs.    Problem/Plan - 4:  ·  Problem: Elevated troponin.   ·  Plan: Troponin 299.5--> 305  Unlikely ischemic.  ECG: Tachycardia - No ST changes   - ASA   - Statin   - Trend troponin   - Heparin SubQ.    Problem/Plan - 5:  ·  Problem: Hypertension.   ·  Plan: Continue home meds   - held HCTZ 12.5mg   - held Met succ 50mg   - Monitor BP.           84 year old German speaking female with a PMH of HTN, HLP, Pre-DM, CAD s/p PCI, frequent UTI BIBEMS from home to be evaluated for AMS. As per daughter, patient went to take a nap s/p neurology appointment, they went to wake her up, she was difficult to arose and appeared confused. Upon evaluation, patient AAOx3, back to baseline as per daughter. Endorses she is feeling weak, otherwise no pain or discomfort. Denies chest pain, palpitation, SOB, headache, dizziness, abdominal pain, N/V/D, dysuria, increase urinary frequency/urgency or nay other acute symptoms. In the ED, patient met sepsis criteria with Leukocytosis – WBC: 20.38, Lactic acidosis – Lactate:2.1, Tachycardic: HR-123, Febrile: T-max:101.9, Source: UTI. Troponin 299.5-->305.6. ECG- No ST changes. Cr:1.78. Received 1.5L-NS. Ofirmev, Ceftriaxone.     Problem/Plan - 1:  ·  Problem: At risk for sepsis due to urinary tract infection. E coli bacteremia  ·  Plan: In the ED, patient met sepsis criteria with Leukocytosis – WBC: 20.38, Lactic acidosis – Lactate:2.1, Tachycardic: HR-123, Febrile: T-max:101.9, Source: UTI.  UA: (+) Nitrite, Moderate- Leuk esterase, WBC>50, Moderate Bacteria  CT-Abd: Air within the wall of the urinary bladder and extending into adjacent veins. 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   - Tele  - IV-NS  - Ceftriaxone   - Tylenol for fever   - Culture E coli bacteremia  - DVT Prophylaxis.  (10/19) Repeat blood culture neg. C/W abx. Discharge planning   (10/20) low grade fevers. C/W IV abx. Clinically improving. ID F/U with abx for discharge.    Problem/Plan - 2:  ·  Problem: Altered mental state.   ·  Plan: Resolved    Problem/Plan - 3:  ·  Problem: MC (acute kidney injury).   ·  Plan: Cr: 1.78  - IV-NS  - Monitor Kidney function   - Avoid nephrotoxic drugs.    Problem/Plan - 4:  ·  Problem: Elevated troponin.   ·  Plan: Troponin 299.5--> 305  Unlikely ischemic.  ECG: Tachycardia - No ST changes   - ASA   - Statin   - Trend troponin   - Heparin SubQ.    Problem/Plan - 5:  ·  Problem: Hypertension.   ·  Plan: Continue home meds   - held HCTZ 12.5mg   - held Met succ 50mg   - Monitor BP.

## 2024-10-20 NOTE — PROGRESS NOTE ADULT - SUBJECTIVE AND OBJECTIVE BOX
Patient is a 84y old  Female who presents with a chief complaint of AMS & Sepsis Secondary  to UTI (19 Oct 2024 15:20)    INTERVAL HPI/OVERNIGHT EVENTS: Patients seen and examined at bedside this morning. No acute events overnight. Pt reports    MEDICATIONS  (STANDING):  cefTRIAXone   IVPB 2000 milliGRAM(s) IV Intermittent every 24 hours  heparin   Injectable 5000 Unit(s) SubCutaneous every 8 hours  metoprolol succinate ER 25 milliGRAM(s) Oral daily  nystatin Ointment 1 Application(s) Topical every 12 hours  sodium phosphate 15 milliMole(s)/250 mL IVPB 15 milliMole(s) IV Intermittent once  triamcinolone 0.1% Ointment 1 Application(s) Topical every 12 hours    MEDICATIONS  (PRN):  acetaminophen     Tablet .. 650 milliGRAM(s) Oral every 6 hours PRN Temp greater or equal to 38C (100.4F), Mild Pain (1 - 3)  aluminum hydroxide/magnesium hydroxide/simethicone Suspension 30 milliLiter(s) Oral every 4 hours PRN Dyspepsia  melatonin 3 milliGRAM(s) Oral at bedtime PRN Insomnia  ondansetron Injectable 4 milliGRAM(s) IV Push every 8 hours PRN Nausea and/or Vomiting    Allergies    No Known Allergies    Intolerances      REVIEW OF SYSTEMS:  All other systems reviewed and are negative    Vital Signs Last 24 Hrs  T(C): 37.4 (20 Oct 2024 04:57), Max: 37.4 (20 Oct 2024 04:57)  T(F): 99.3 (20 Oct 2024 04:57), Max: 99.3 (20 Oct 2024 04:57)  HR: 107 (20 Oct 2024 04:57) (103 - 120)  BP: 149/77 (20 Oct 2024 04:57) (110/78 - 149/77)  BP(mean): 3 (20 Oct 2024 04:57) (3 - 75)  RR: 18 (20 Oct 2024 04:57) (18 - 18)  SpO2: 100% (20 Oct 2024 04:57) (99% - 100%)    Parameters below as of 20 Oct 2024 04:57  Patient On (Oxygen Delivery Method): nasal cannula  O2 Flow (L/min): 3    Daily     Daily   I&O's Summary    19 Oct 2024 07:01  -  20 Oct 2024 07:00  --------------------------------------------------------  IN: 0 mL / OUT: 700 mL / NET: -700 mL      CAPILLARY BLOOD GLUCOSE        PHYSICAL EXAM:  GENERAL: NAD, comfortable in bed   HEAD:  Atraumatic, Normocephalic  EYES: EOMI, PERRL, conjunctiva and sclera clear  NECK: Supple, No JVD  LUNG: Clear to auscultation bilaterally; No wheezes, rales or rhonchi  HEART: Regular rate and rhythm; No murmurs, rubs, or gallops  ABDOMEN: Soft, Nontender, Nondistended; Normal Bowel sounds   EXTREMITIES:  No clubbing, cyanosis, or edema  PSYCH: normal mood and affect  NEUROLOGY: AAOx3, non-focal  Skin - Erythematous, tender area around the gluteal fold, into the vaginal area.      Labs                          11.1   8.64  )-----------( 180      ( 20 Oct 2024 06:00 )             35.9     10-20    137  |  103  |  16  ----------------------------<  145[H]  4.1   |  29  |  0.94    Ca    10.9[H]      20 Oct 2024 06:00  Phos  2.1     10-20  Mg     1.5     10-20    TPro  6.6  /  Alb  2.2[L]  /  TBili  0.4  /  DBili  x   /  AST  73[H]  /  ALT  72  /  AlkPhos  234[H]  10-20          Urinalysis Basic - ( 20 Oct 2024 06:00 )    Color: x / Appearance: x / SG: x / pH: x  Gluc: 145 mg/dL / Ketone: x  / Bili: x / Urobili: x   Blood: x / Protein: x / Nitrite: x   Leuk Esterase: x / RBC: x / WBC x   Sq Epi: x / Non Sq Epi: x / Bacteria: x                      Radiology and Imaging reviewed. Patient is a 84y old  Female who presents with a chief complaint of AMS & Sepsis Secondary  to UTI (19 Oct 2024 15:20)    INTERVAL HPI/OVERNIGHT EVENTS: Patients seen and examined at bedside this morning. No acute events overnight    MEDICATIONS  (STANDING):  cefTRIAXone   IVPB 2000 milliGRAM(s) IV Intermittent every 24 hours  heparin   Injectable 5000 Unit(s) SubCutaneous every 8 hours  metoprolol succinate ER 25 milliGRAM(s) Oral daily  nystatin Ointment 1 Application(s) Topical every 12 hours  sodium phosphate 15 milliMole(s)/250 mL IVPB 15 milliMole(s) IV Intermittent once  triamcinolone 0.1% Ointment 1 Application(s) Topical every 12 hours    MEDICATIONS  (PRN):  acetaminophen     Tablet .. 650 milliGRAM(s) Oral every 6 hours PRN Temp greater or equal to 38C (100.4F), Mild Pain (1 - 3)  aluminum hydroxide/magnesium hydroxide/simethicone Suspension 30 milliLiter(s) Oral every 4 hours PRN Dyspepsia  melatonin 3 milliGRAM(s) Oral at bedtime PRN Insomnia  ondansetron Injectable 4 milliGRAM(s) IV Push every 8 hours PRN Nausea and/or Vomiting    Allergies    No Known Allergies    Intolerances      REVIEW OF SYSTEMS:  All other systems reviewed and are negative    Vital Signs Last 24 Hrs  T(C): 37.4 (20 Oct 2024 04:57), Max: 37.4 (20 Oct 2024 04:57)  T(F): 99.3 (20 Oct 2024 04:57), Max: 99.3 (20 Oct 2024 04:57)  HR: 107 (20 Oct 2024 04:57) (103 - 120)  BP: 149/77 (20 Oct 2024 04:57) (110/78 - 149/77)  BP(mean): 3 (20 Oct 2024 04:57) (3 - 75)  RR: 18 (20 Oct 2024 04:57) (18 - 18)  SpO2: 100% (20 Oct 2024 04:57) (99% - 100%)    Parameters below as of 20 Oct 2024 04:57  Patient On (Oxygen Delivery Method): nasal cannula  O2 Flow (L/min): 3    Daily     Daily   I&O's Summary    19 Oct 2024 07:01  -  20 Oct 2024 07:00  --------------------------------------------------------  IN: 0 mL / OUT: 700 mL / NET: -700 mL      CAPILLARY BLOOD GLUCOSE        PHYSICAL EXAM:  GENERAL: NAD, comfortable in bed   HEAD:  Atraumatic, Normocephalic  EYES: EOMI, PERRL, conjunctiva and sclera clear  NECK: Supple, No JVD  LUNG: Clear to auscultation bilaterally; No wheezes, rales or rhonchi  HEART: Regular rate and rhythm; No murmurs, rubs, or gallops  ABDOMEN: Soft, Nontender, Nondistended; Normal Bowel sounds   EXTREMITIES:  No clubbing, cyanosis, or edema  PSYCH: normal mood and affect  NEUROLOGY: AAOx3, non-focal  Skin - Erythematous, tender area around the gluteal fold, into the vaginal area.      Labs                          11.1   8.64  )-----------( 180      ( 20 Oct 2024 06:00 )             35.9     10-20    137  |  103  |  16  ----------------------------<  145[H]  4.1   |  29  |  0.94    Ca    10.9[H]      20 Oct 2024 06:00  Phos  2.1     10-20  Mg     1.5     10-20    TPro  6.6  /  Alb  2.2[L]  /  TBili  0.4  /  DBili  x   /  AST  73[H]  /  ALT  72  /  AlkPhos  234[H]  10-20          Urinalysis Basic - ( 20 Oct 2024 06:00 )    Color: x / Appearance: x / SG: x / pH: x  Gluc: 145 mg/dL / Ketone: x  / Bili: x / Urobili: x   Blood: x / Protein: x / Nitrite: x   Leuk Esterase: x / RBC: x / WBC x   Sq Epi: x / Non Sq Epi: x / Bacteria: x                      Radiology and Imaging reviewed.

## 2024-10-21 ENCOUNTER — TRANSCRIPTION ENCOUNTER (OUTPATIENT)
Age: 84
End: 2024-10-21

## 2024-10-21 LAB
ALBUMIN SERPL ELPH-MCNC: 2.2 G/DL — LOW (ref 3.3–5)
ALP SERPL-CCNC: 219 U/L — HIGH (ref 40–120)
ALT FLD-CCNC: 71 U/L — SIGNIFICANT CHANGE UP (ref 12–78)
ANION GAP SERPL CALC-SCNC: 5 MMOL/L — SIGNIFICANT CHANGE UP (ref 5–17)
AST SERPL-CCNC: 62 U/L — HIGH (ref 15–37)
BILIRUB SERPL-MCNC: 0.6 MG/DL — SIGNIFICANT CHANGE UP (ref 0.2–1.2)
BUN SERPL-MCNC: 14 MG/DL — SIGNIFICANT CHANGE UP (ref 7–23)
CALCIUM SERPL-MCNC: 11 MG/DL — HIGH (ref 8.5–10.1)
CHLORIDE SERPL-SCNC: 103 MMOL/L — SIGNIFICANT CHANGE UP (ref 96–108)
CO2 SERPL-SCNC: 28 MMOL/L — SIGNIFICANT CHANGE UP (ref 22–31)
CREAT SERPL-MCNC: 0.85 MG/DL — SIGNIFICANT CHANGE UP (ref 0.5–1.3)
EGFR: 68 ML/MIN/1.73M2 — SIGNIFICANT CHANGE UP
GLUCOSE SERPL-MCNC: 130 MG/DL — HIGH (ref 70–99)
HCT VFR BLD CALC: 34 % — LOW (ref 34.5–45)
HGB BLD-MCNC: 10.8 G/DL — LOW (ref 11.5–15.5)
MCHC RBC-ENTMCNC: 28 PG — SIGNIFICANT CHANGE UP (ref 27–34)
MCHC RBC-ENTMCNC: 31.8 G/DL — LOW (ref 32–36)
MCV RBC AUTO: 88.1 FL — SIGNIFICANT CHANGE UP (ref 80–100)
NRBC # BLD: 0 /100 WBCS — SIGNIFICANT CHANGE UP (ref 0–0)
PLATELET # BLD AUTO: 205 K/UL — SIGNIFICANT CHANGE UP (ref 150–400)
POTASSIUM SERPL-MCNC: 3.7 MMOL/L — SIGNIFICANT CHANGE UP (ref 3.5–5.3)
POTASSIUM SERPL-SCNC: 3.7 MMOL/L — SIGNIFICANT CHANGE UP (ref 3.5–5.3)
PROT SERPL-MCNC: 6.8 GM/DL — SIGNIFICANT CHANGE UP (ref 6–8.3)
RBC # BLD: 3.86 M/UL — SIGNIFICANT CHANGE UP (ref 3.8–5.2)
RBC # FLD: 15.8 % — HIGH (ref 10.3–14.5)
SODIUM SERPL-SCNC: 136 MMOL/L — SIGNIFICANT CHANGE UP (ref 135–145)
WBC # BLD: 7.85 K/UL — SIGNIFICANT CHANGE UP (ref 3.8–10.5)
WBC # FLD AUTO: 7.85 K/UL — SIGNIFICANT CHANGE UP (ref 3.8–10.5)

## 2024-10-21 PROCEDURE — 99232 SBSQ HOSP IP/OBS MODERATE 35: CPT

## 2024-10-21 RX ORDER — TRIAMCINOLONE ACETONIDE/L.S.B. 0.5 %
1 CREAM (GRAM) TOPICAL
Qty: 0 | Refills: 0 | DISCHARGE
Start: 2024-10-21

## 2024-10-21 RX ORDER — NYSTATIN 100000 U/G
1 POWDER TOPICAL
Qty: 0 | Refills: 0 | DISCHARGE
Start: 2024-10-21

## 2024-10-21 RX ADMIN — NYSTATIN 1 APPLICATION(S): 100000 POWDER TOPICAL at 05:35

## 2024-10-21 RX ADMIN — Medication 650 MILLIGRAM(S): at 15:09

## 2024-10-21 RX ADMIN — Medication 100 MILLIGRAM(S): at 15:54

## 2024-10-21 RX ADMIN — Medication 1 APPLICATION(S): at 17:31

## 2024-10-21 RX ADMIN — Medication 650 MILLIGRAM(S): at 14:23

## 2024-10-21 RX ADMIN — HEPARIN SODIUM 5000 UNIT(S): 10000 INJECTION INTRAVENOUS; SUBCUTANEOUS at 21:19

## 2024-10-21 RX ADMIN — Medication 25 MILLIGRAM(S): at 05:33

## 2024-10-21 RX ADMIN — HEPARIN SODIUM 5000 UNIT(S): 10000 INJECTION INTRAVENOUS; SUBCUTANEOUS at 14:23

## 2024-10-21 RX ADMIN — NYSTATIN 1 APPLICATION(S): 100000 POWDER TOPICAL at 17:31

## 2024-10-21 RX ADMIN — Medication 1 APPLICATION(S): at 05:35

## 2024-10-21 RX ADMIN — HEPARIN SODIUM 5000 UNIT(S): 10000 INJECTION INTRAVENOUS; SUBCUTANEOUS at 05:33

## 2024-10-21 NOTE — PROGRESS NOTE ADULT - NS ATTEND OPT1 GEN_ALL_CORE
Physical Exam  
Skin:  
 
  
 
Dual skin assessment completed with Zain Beasley 
 I independently performed the documented:

## 2024-10-21 NOTE — DISCHARGE NOTE PROVIDER - CARE PROVIDERS DIRECT ADDRESSES
,[email or web address]

## 2024-10-21 NOTE — PROGRESS NOTE ADULT - SUBJECTIVE AND OBJECTIVE BOX
Patient is a 84y old  Female who presents with a chief complaint of AMS & Sepsis Secondary  to UTI (21 Oct 2024 09:51)    INTERVAL HPI/OVERNIGHT EVENTS: Patients seen and examined at bedside this morning. No acute events overnight. Pt was confused overnight and was not able to sleep. This morning fatigue. Seen at daughter at bedside.     MEDICATIONS  (STANDING):  cefTRIAXone   IVPB 2000 milliGRAM(s) IV Intermittent every 24 hours  heparin   Injectable 5000 Unit(s) SubCutaneous every 8 hours  metoprolol succinate ER 25 milliGRAM(s) Oral daily  nystatin Ointment 1 Application(s) Topical every 12 hours  triamcinolone 0.1% Ointment 1 Application(s) Topical every 12 hours    MEDICATIONS  (PRN):  acetaminophen     Tablet .. 650 milliGRAM(s) Oral every 6 hours PRN Temp greater or equal to 38C (100.4F), Mild Pain (1 - 3)  aluminum hydroxide/magnesium hydroxide/simethicone Suspension 30 milliLiter(s) Oral every 4 hours PRN Dyspepsia  melatonin 3 milliGRAM(s) Oral at bedtime PRN Insomnia  ondansetron Injectable 4 milliGRAM(s) IV Push every 8 hours PRN Nausea and/or Vomiting    Allergies    No Known Allergies    Intolerances      REVIEW OF SYSTEMS:  All other systems reviewed and are negative    Vital Signs Last 24 Hrs  T(C): 37 (21 Oct 2024 10:32), Max: 37 (21 Oct 2024 10:32)  T(F): 98.6 (21 Oct 2024 10:32), Max: 98.6 (21 Oct 2024 10:32)  HR: 101 (21 Oct 2024 10:32) (71 - 105)  BP: 134/60 (21 Oct 2024 10:32) (134/60 - 154/78)  BP(mean): --  RR: 18 (21 Oct 2024 10:32) (18 - 18)  SpO2: 95% (21 Oct 2024 10:32) (94% - 98%)    Parameters below as of 21 Oct 2024 05:06  Patient On (Oxygen Delivery Method): room air      Daily     Daily Weight in k.4 (21 Oct 2024 05:06)  I&O's Summary    21 Oct 2024 07:01  -  21 Oct 2024 13:03  --------------------------------------------------------  IN: 320 mL / OUT: 0 mL / NET: 320 mL      CAPILLARY BLOOD GLUCOSE        PHYSICAL EXAM:  GENERAL: NAD, comfortable in bed   HEAD:  Atraumatic, Normocephalic  EYES: EOMI, PERRL, conjunctiva and sclera clear  NECK: Supple, No JVD  LUNG: Clear to auscultation bilaterally; No wheezes, rales or rhonchi  HEART: Regular rate and rhythm; No murmurs, rubs, or gallops  ABDOMEN: Soft, Nontender, Nondistended; Normal Bowel sounds   EXTREMITIES:  No clubbing, cyanosis, or edema  PSYCH: normal mood and affect  NEUROLOGY: AAOx3, non-focal  Skin - Erythematous, tender area around the gluteal fold, into the vaginal area.      Labs                          10.8   7.85  )-----------( 205      ( 21 Oct 2024 06:10 )             34.0     10-    136  |  103  |  14  ----------------------------<  130[H]  3.7   |  28  |  0.85    Ca    11.0[H]      21 Oct 2024 06:10  Phos  2.1     10-20  Mg     1.5     10-20    TPro  6.8  /  Alb  2.2[L]  /  TBili  0.6  /  DBili  x   /  AST  62[H]  /  ALT  71  /  AlkPhos  219[H]  10-21          Urinalysis Basic - ( 21 Oct 2024 06:10 )    Color: x / Appearance: x / SG: x / pH: x  Gluc: 130 mg/dL / Ketone: x  / Bili: x / Urobili: x   Blood: x / Protein: x / Nitrite: x   Leuk Esterase: x / RBC: x / WBC x   Sq Epi: x / Non Sq Epi: x / Bacteria: x        Culture - Blood (collected 19 Oct 2024 08:10)  Source: .Blood BLOOD  Preliminary Report (21 Oct 2024 13:01):    No growth at 48 Hours    Culture - Blood (collected 19 Oct 2024 08:10)  Source: .Blood BLOOD  Preliminary Report (21 Oct 2024 13:01):    No growth at 48 Hours                    Radiology and Imaging reviewed.

## 2024-10-21 NOTE — PROGRESS NOTE ADULT - SUBJECTIVE AND OBJECTIVE BOX
JOSE M ZHOU  MRN-[id number]  84y (1940)    Follow Up:  Bacteremia, UTI    Interval History: The pt was seen and examined earlier, not in acute distress, awake and fatigued, pleasant, mildly confused, pt's daughter is present. Pt is afebrile, RA, no WBC.     PAST MEDICAL & SURGICAL HISTORY:  HTN (Hypertension)      Renal Calculi      H/O: Osteoarthritis      Recurrent UTI      Prediabetes      Pulmonary fibrosis          ROS:  limited, pt is confused, no cough, no sob, no chest pain, no abd pain   [ ] Unobtainable because:  [ x] All other systems negative    Constitutional: no fever, no chills  Head: no trauma  Eyes: no vision changes, no eye pain  ENT:  no sore throat, no rhinorrhea  Cardiovascular:  no chest pain, no palpitation  Respiratory:  no SOB, no cough  GI:  no abd pain, no vomiting, no diarrhea  urinary: no dysuria, no hematuria, no flank pain  musculoskeletal:  no joint pain, no joint swelling  skin:  no rash  neurology:  no headache, no seizure, no change in mental status  psych: no anxiety, no depression         Allergies  No Known Allergies        ANTIMICROBIALS:  cefTRIAXone   IVPB 2000 every 24 hours      OTHER MEDS:  acetaminophen     Tablet .. 650 milliGRAM(s) Oral every 6 hours PRN  aluminum hydroxide/magnesium hydroxide/simethicone Suspension 30 milliLiter(s) Oral every 4 hours PRN  heparin   Injectable 5000 Unit(s) SubCutaneous every 8 hours  melatonin 3 milliGRAM(s) Oral at bedtime PRN  metoprolol succinate ER 25 milliGRAM(s) Oral daily  nystatin Ointment 1 Application(s) Topical every 12 hours  ondansetron Injectable 4 milliGRAM(s) IV Push every 8 hours PRN  triamcinolone 0.1% Ointment 1 Application(s) Topical every 12 hours      Vital Signs Last 24 Hrs  T(C): 37 (21 Oct 2024 10:32), Max: 37 (21 Oct 2024 10:32)  T(F): 98.6 (21 Oct 2024 10:32), Max: 98.6 (21 Oct 2024 10:32)  HR: 101 (21 Oct 2024 10:32) (71 - 105)  BP: 134/60 (21 Oct 2024 10:32) (134/60 - 154/78)  BP(mean): --  RR: 18 (21 Oct 2024 10:32) (18 - 18)  SpO2: 95% (21 Oct 2024 10:32) (94% - 98%)    Parameters below as of 21 Oct 2024 05:06  Patient On (Oxygen Delivery Method): room air        Physical Exam:  Constitutional: Pleasant elderly woman, non-toxic, no distress  HEAD/EYES: anicteric, no conjunctival injection  ENT:  supple, no thrush  Cardiovascular:   normal S1, S2, no murmur, no edema  Respiratory:  clear BS bilaterally, no wheezes, no rales  GI:  soft, non-tender, normal bowel sounds  :  no ricci, no CVA tenderness  Musculoskeletal:  no synovitis   Neurologic: awake and alert, normal strength, able to answer simple questions, follows simple commands   Skin:  no rash, no erythema, no phlebitis  Heme/Onc: no lymphadenopathy   Psychiatric:  awake, alert, appropriate mood    WBC Count: 7.85 K/uL (10-21 @ 06:10)  WBC Count: 8.64 K/uL (10-20 @ 06:00)  WBC Count: 14.67 K/uL (10-19 @ 07:58)  WBC Count: 22.18 K/uL (10-18 @ 06:30)  WBC Count: 23.30 K/uL (10-17 @ 06:50)  WBC Count: 20.38 K/uL (10-16 @ 20:21)                            10.8   7.85  )-----------( 205      ( 21 Oct 2024 06:10 )             34.0       10-21    136  |  103  |  14  ----------------------------<  130[H]  3.7   |  28  |  0.85    Ca    11.0[H]      21 Oct 2024 06:10  Phos  2.1     10-20  Mg     1.5     10-20    TPro  6.8  /  Alb  2.2[L]  /  TBili  0.6  /  DBili  x   /  AST  62[H]  /  ALT  71  /  AlkPhos  219[H]  10-21      Urinalysis Basic - ( 21 Oct 2024 06:10 )    Color: x / Appearance: x / SG: x / pH: x  Gluc: 130 mg/dL / Ketone: x  / Bili: x / Urobili: x   Blood: x / Protein: x / Nitrite: x   Leuk Esterase: x / RBC: x / WBC x   Sq Epi: x / Non Sq Epi: x / Bacteria: x        Creatinine Trend: 0.85<--, 0.94<--, 0.89<--, 1.15<--, 1.49<--, 1.78<--      MICROBIOLOGY:  v  .Blood BLOOD  10-19-24   No growth at 48 Hours  --  --      Catheterized  10-17-24   10,000 - 49,000 CFU/mL Escherichia coli  <10,000 CFU/ml Normal Urogenital rober present  --  Escherichia coli      .Blood BLOOD  10-16-24   Growth in aerobic and anaerobic bottles: Escherichia coli  See previous culture [id number]  --  Blood Culture PCR  Escherichia coli      SARS-CoV-2 Result: Kiley (10-16-24 @ 20:21)      RADIOLOGY:     JOSE M ZHOU  MRN-[id number]  84y (1940)    Follow Up:  Bacteremia, UTI    Interval History: The pt was seen and examined earlier, not in acute distress, awake and fatigued, pleasant, mildly confused, pt's daughter is present. Pt is afebrile, RA, no WBC.       ROS:  limited, pt is confused, no cough, no sob, no chest pain, no abd pain   [ ] Unobtainable because:  [ x] All other systems negative    Constitutional: no fever, no chills  Head: no trauma  Eyes: no vision changes, no eye pain  ENT:  no sore throat, no rhinorrhea  Cardiovascular:  no chest pain, no palpitation  Respiratory:  no SOB, no cough  GI:  no abd pain, no vomiting, no diarrhea  urinary: no dysuria, no hematuria, no flank pain  musculoskeletal:  no joint pain, no joint swelling  skin:  no rash  neurology:  no headache, no seizure, no change in mental status  psych: no anxiety, no depression         Allergies  No Known Allergies        ANTIMICROBIALS:  cefTRIAXone   IVPB 2000 every 24 hours      OTHER MEDS:  acetaminophen     Tablet .. 650 milliGRAM(s) Oral every 6 hours PRN  aluminum hydroxide/magnesium hydroxide/simethicone Suspension 30 milliLiter(s) Oral every 4 hours PRN  heparin   Injectable 5000 Unit(s) SubCutaneous every 8 hours  melatonin 3 milliGRAM(s) Oral at bedtime PRN  metoprolol succinate ER 25 milliGRAM(s) Oral daily  nystatin Ointment 1 Application(s) Topical every 12 hours  ondansetron Injectable 4 milliGRAM(s) IV Push every 8 hours PRN  triamcinolone 0.1% Ointment 1 Application(s) Topical every 12 hours      Vital Signs Last 24 Hrs  T(C): 37 (21 Oct 2024 10:32), Max: 37 (21 Oct 2024 10:32)  T(F): 98.6 (21 Oct 2024 10:32), Max: 98.6 (21 Oct 2024 10:32)  HR: 101 (21 Oct 2024 10:32) (71 - 105)  BP: 134/60 (21 Oct 2024 10:32) (134/60 - 154/78)  BP(mean): --  RR: 18 (21 Oct 2024 10:32) (18 - 18)  SpO2: 95% (21 Oct 2024 10:32) (94% - 98%)    Parameters below as of 21 Oct 2024 05:06  Patient On (Oxygen Delivery Method): room air        Physical Exam:  Constitutional: Pleasant elderly woman, non-toxic, no distress  HEAD/EYES: anicteric, no conjunctival injection  ENT:  supple, no thrush  Cardiovascular:   normal S1, S2, no murmur, no edema  Respiratory:  clear BS bilaterally, no wheezes, no rales  GI:  soft, non-tender, normal bowel sounds  :  no ricci, no CVA tenderness  Musculoskeletal:  no synovitis   Neurologic: awake and alert, normal strength, able to answer simple questions, follows simple commands   Skin:  no rash, no erythema, no phlebitis  Heme/Onc: no lymphadenopathy   Psychiatric:  awake, alert, appropriate mood    WBC Count: 7.85 K/uL (10-21 @ 06:10)  WBC Count: 8.64 K/uL (10-20 @ 06:00)  WBC Count: 14.67 K/uL (10-19 @ 07:58)  WBC Count: 22.18 K/uL (10-18 @ 06:30)  WBC Count: 23.30 K/uL (10-17 @ 06:50)  WBC Count: 20.38 K/uL (10-16 @ 20:21)                            10.8   7.85  )-----------( 205      ( 21 Oct 2024 06:10 )             34.0       10-21    136  |  103  |  14  ----------------------------<  130[H]  3.7   |  28  |  0.85    Ca    11.0[H]      21 Oct 2024 06:10  Phos  2.1     10-20  Mg     1.5     10-20    TPro  6.8  /  Alb  2.2[L]  /  TBili  0.6  /  DBili  x   /  AST  62[H]  /  ALT  71  /  AlkPhos  219[H]  10-21      Urinalysis Basic - ( 21 Oct 2024 06:10 )    Color: x / Appearance: x / SG: x / pH: x  Gluc: 130 mg/dL / Ketone: x  / Bili: x / Urobili: x   Blood: x / Protein: x / Nitrite: x   Leuk Esterase: x / RBC: x / WBC x   Sq Epi: x / Non Sq Epi: x / Bacteria: x        Creatinine Trend: 0.85<--, 0.94<--, 0.89<--, 1.15<--, 1.49<--, 1.78<--      MICROBIOLOGY:  v  .Blood BLOOD  10-19-24   No growth at 48 Hours  --  --      Catheterized  10-17-24   10,000 - 49,000 CFU/mL Escherichia coli  <10,000 CFU/ml Normal Urogenital rober present  --  Escherichia coli      .Blood BLOOD  10-16-24   Growth in aerobic and anaerobic bottles: Escherichia coli  See previous culture [id number]  --  Blood Culture PCR  Escherichia coli      SARS-CoV-2 Result: Martin (10-16-24 @ 20:21)      RADIOLOGY:

## 2024-10-21 NOTE — DISCHARGE NOTE PROVIDER - NSDCFUADDAPPT_GEN_ALL_CORE_FT
10/23/24- Appointment was scheduled by our team on the patient's behalf through the provider's office of Dr. Brock on Friday 10/25/24 @ 4:15 p.m. spoke with Daughter Inga and confirmed.

## 2024-10-21 NOTE — DISCHARGE NOTE PROVIDER - NSDCCPCAREPLAN_GEN_ALL_CORE_FT
PRINCIPAL DISCHARGE DIAGNOSIS  Diagnosis: Sepsis  Assessment and Plan of Treatment: Please take your medication as prescribed, and ensure to follow up with your primary care doctor.      SECONDARY DISCHARGE DIAGNOSES  Diagnosis: AMS (altered mental status)  Assessment and Plan of Treatment:

## 2024-10-21 NOTE — PROGRESS NOTE ADULT - ASSESSMENT
The patient is 84 year old Bruneian speaking woman with a PMH of HTN, HLP, Pre-DM, CAD s/p PCI, frequent UTIs, who was BIBEMS from home to be evaluated for AMS. As per daughter, patient went to take a nap s/p neurology appointment, they went to wake her up, she was difficult to arouse and appeared confused. Upon evaluation, patient AAOx3, back to baseline as per daughter. Endorses she is feeling weak, otherwise no pain or discomfort. Denies chest pain, palpitation, SOB, headache, dizziness, abdominal pain, N/V/D, dysuria, increase urinary frequency/urgency or any other acute symptoms. In the ED, patient met sepsis criteria with Leukocytosis – WBC: 20.38, Lactic acidosis – Lactate:2.1, Tachycardic: HR-123, Febrile: T-max:101.9, Source: UTI. Troponin 299.5-->305.6. ECG- No ST changes. Cr:1.78. Received 1.5L-NS. Ofirmev, Ceftriaxone. CT-Abd: Air within the wall of the urinary bladder and extending into adjacent veins. 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 (17 Oct 2024 03:02)    ID consulted for workup and antibiotic management       10/18: Febrile, T-max 103. Appears comfortable. Admission blood cultures grew E. Coli. Urine culture is pending. Family reported urinary incontinence for a while. She has recurrent UTI's, 4 times this year requiring course of antibiotics. She has sustained T12 fracture after a fall recently. Also has remote history of kidney stones. No recent urinary instrumentation. Follows Dr. Stefan Valle  10/21: afebrile, RA, no wbc, Cr ok, UC grew 10-49K E. Coli, initial BCs grew E. Coli, repeat BCs NGTD, today is day #5 of abx, pt needs 14 days course total. Will continue with iv abx while in pt and when ready for discharge, ok to change abx to po cefpodoxime to complete the course.     Impression:  1. E. Coli bacteremia  2. Acute emphysematous cystitis  3. Fever and leukocytosis   4. Lactic acidosis  5. Transient encephalopathy  6. Hx of recurrent UTI's  7. Chronic interstitial lung disease in former smoker  8. Co-morbidities: Old age, CAD, recent T12 fracture, high BMI      Recommendations:  - Continue ceftriaxone 2 g IV q24 while in pt  - if medically stable for discharge, no objections to the change of abx to po Cefpodoxime to complete 14 days course total including ceftriaxone iv already received    - Follow urine culture and blood culture  - Trend white count, lactate and fever curve  - Rest of care per primary team    Discussed with Dr. Interiano  Discussed with Dr. Nj

## 2024-10-21 NOTE — DISCHARGE NOTE PROVIDER - HOSPITAL COURSE
84 year old Croatian speaking female with a PMH of HTN, HLP, Pre-DM, CAD s/p PCI, frequent UTI BIBEMS from home to be evaluated for AMS. As per daughter, patient went to take a nap s/p neurology appointment, they went to wake her up, she was difficult to arose and appeared confused. Upon evaluation, patient AAOx3, back to baseline as per daughter. Endorses she is feeling weak, otherwise no pain or discomfort. Denies chest pain, palpitation, SOB, headache, dizziness, abdominal pain, N/V/D, dysuria, increase urinary frequency/urgency or nay other acute symptoms. In the ED, patient met sepsis criteria with Leukocytosis – WBC: 20.38, Lactic acidosis – Lactate:2.1, Tachycardic: HR-123, Febrile: T-max:101.9, Source: UTI. Troponin 299.5-->305.6. ECG- No ST changes. Cr:1.78. Received 1.5L-NS. Ofirmev, Ceftriaxone.     Problem/Plan - 1:  ·  Problem: At risk for sepsis due to urinary tract infection. E coli bacteremia  ·  Plan: In the ED, patient met sepsis criteria with Leukocytosis – WBC: 20.38, Lactic acidosis – Lactate:2.1, Tachycardic: HR-123, Febrile: T-max:101.9, Source: UTI.  UA: (+) Nitrite, Moderate- Leuk esterase, WBC>50, Moderate Bacteria  CT-Abd: Air within the wall of the urinary bladder and extending into adjacent veins. 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   - Tele  - IV-NS  - Ceftriaxone   - Tylenol for fever   - Culture E coli bacteremia  - DVT Prophylaxis.  (10/19) Repeat blood culture neg. C/W abx. Discharge planning   (10/20) low grade fevers. C/W IV abx. Clinically improving. ID F/U with abx for discharge.    Problem/Plan - 2:  ·  Problem: Altered mental state.   ·  Plan: Resolved    Problem/Plan - 3:  ·  Problem: MC (acute kidney injury).   ·  Plan: Cr: 1.78  - IV-NS  - Monitor Kidney function   - Avoid nephrotoxic drugs.    Problem/Plan - 4:  ·  Problem: Elevated troponin.   ·  Plan: Troponin 299.5--> 305--> 235.2  Unlikely ischemic.  ECG: Tachycardia - No ST changes   - ASA   - Statin   - Trend troponin   - Heparin SubQ.    Problem/Plan - 5:  ·  Problem: Hypertension.   ·  Plan: Continue home meds   - held HCTZ 12.5mg   - held Met succ 50mg   - Monitor BP.     84 year old Belarusian speaking female with a PMH of HTN, HLP, Pre-DM, CAD s/p PCI, frequent UTI BIBEMS from home to be evaluated for AMS. As per daughter, patient went to take a nap s/p neurology appointment, they went to wake her up, she was difficult to arose and appeared confused. Upon evaluation, patient AAOx3, back to baseline as per daughter. Endorses she is feeling weak, otherwise no pain or discomfort. Denies chest pain, palpitation, SOB, headache, dizziness, abdominal pain, N/V/D, dysuria, increase urinary frequency/urgency or nay other acute symptoms. In the ED, patient met sepsis criteria with Leukocytosis – WBC: 20.38, Lactic acidosis – Lactate:2.1, Tachycardic: HR-123, Febrile: T-max:101.9, Source: UTI. Troponin 299.5-->305.6. ECG- No ST changes. Cr:1.78. Received 1.5L-NS. Ofirmev, Ceftriaxone. Patient medically stable for discharge to home with homecare.    Problem/Plan - 1:  ·  Problem: At risk for sepsis due to urinary tract infection. E coli bacteremia  ·  Plan: In the ED, patient met sepsis criteria with Leukocytosis – WBC: 20.38, Lactic acidosis – Lactate:2.1, Tachycardic: HR-123, Febrile: T-max:101.9, Source: UTI.  UA: (+) Nitrite, Moderate- Leuk esterase, WBC>50, Moderate Bacteria  CT-Abd: Air within the wall of the urinary bladder and extending into adjacent veins. 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   - Tele  - IV-NS  - Ceftriaxone   - Tylenol for fever   - Culture E coli bacteremia  - DVT Prophylaxis.  - medically stable for discharge, no objections to the change of abx to po Cefpodoxime to complete 14 days course total including ceftriaxone iv already received    - Follow urine culture and blood culture  - Trend white count, lactate and fever curve      Problem/Plan - 2:  ·  Problem: Altered mental state.   ·  Plan: Resolved    Problem/Plan - 3:  ·  Problem: MC (acute kidney injury).   ·  Plan: Cr: 1.78  - IV-NS  - Monitor Kidney function   - Avoid nephrotoxic drugs.    Problem/Plan - 4:  ·  Problem: Elevated troponin.   ·  Plan: Troponin 299.5--> 305--> 235.2  Unlikely ischemic.  ECG: Tachycardia - No ST changes   - ASA   - Statin   - Trend troponin   - Heparin SubQ.    Problem/Plan - 5:  ·  Problem: Hypertension.   ·  Plan: Continue home meds   - held HCTZ 12.5mg   - held Met succ 50mg   - Monitor BP.

## 2024-10-21 NOTE — DISCHARGE NOTE PROVIDER - NSDCMRMEDTOKEN_GEN_ALL_CORE_FT
aspirin 81 mg oral delayed release tablet: 1 tab(s) orally once a day  atorvastatin 40 mg oral tablet: 1 tab(s) orally once a day (at bedtime)  hydroCHLOROthiazide 12.5 mg oral capsule: 1 cap(s) orally once a day  losartan 100 mg oral tablet: 1 tab(s) orally once a day  memantine 5 mg oral tablet: 1 tab(s) orally once a day (at bedtime)  metoprolol succinate 50 mg oral capsule, extended release: 1 cap(s) orally once a day  nortriptyline 25 mg oral capsule: 1 cap(s) orally once a day (at bedtime)  nystatin 100,000 units/g topical ointment: 1 Apply topically to affected area every 12 hours  triamcinolone 0.1% topical ointment: 1 Apply topically to affected area every 12 hours   aspirin 81 mg oral delayed release tablet: 1 tab(s) orally once a day  atorvastatin 40 mg oral tablet: 1 tab(s) orally once a day (at bedtime)  cefpodoxime 200 mg oral tablet: 1 tab(s) orally 2 times a day  memantine 5 mg oral tablet: 1 tab(s) orally once a day (at bedtime)  metoprolol succinate 50 mg oral capsule, extended release: 1 cap(s) orally once a day  nortriptyline 25 mg oral capsule: 1 cap(s) orally once a day (at bedtime)  nystatin 100,000 units/g topical ointment: 1 Apply topically to affected area every 12 hours  triamcinolone 0.1% topical ointment: 1 Apply topically to affected area every 12 hours   aspirin 81 mg oral delayed release tablet: 1 tab(s) orally once a day  atorvastatin 40 mg oral tablet: 1 tab(s) orally once a day (at bedtime)  cefpodoxime 200 mg oral tablet: 1 tab(s) orally 2 times a day  memantine 5 mg oral tablet: 1 tab(s) orally once a day (at bedtime)  metoprolol succinate 25 mg oral tablet, extended release: 1 tab(s) orally once a day  nortriptyline 25 mg oral capsule: 1 cap(s) orally once a day (at bedtime)  nystatin 100,000 units/g topical ointment: Apply topically to affected area 2 times a day 1 Apply topically to affected area every 12 hours  triamcinolone 0.1% topical ointment: Apply topically to affected area 2 times a day 1 Apply topically to affected area every 12 hours

## 2024-10-21 NOTE — PROGRESS NOTE ADULT - REASON FOR ADMISSION
AMS & Sepsis Secondary  to UTI

## 2024-10-21 NOTE — DISCHARGE NOTE PROVIDER - CARE PROVIDER_API CALL
Cara Santiago  Internal Medicine  [address] [place] [id number]  Phone: [phone]  Fax: [phone]  Established Patient  Follow Up Time: 1 week

## 2024-10-21 NOTE — DISCHARGE NOTE PROVIDER - ATTENDING DISCHARGE PHYSICAL EXAMINATION:
GENERAL: NAD, comfortable in bed   HEAD:  Atraumatic, Normocephalic  EYES: EOMI, PERRL, conjunctiva and sclera clear  NECK: Supple, No JVD  LUNG: Clear to auscultation bilaterally; No wheezes, rales or rhonchi  HEART: Regular rate and rhythm; No murmurs, rubs, or gallops  ABDOMEN: Soft, Nontender, Nondistended; Normal Bowel sounds   EXTREMITIES:  No clubbing, cyanosis, or edema  PSYCH: normal mood and affect  NEUROLOGY: AAOx3, non-focal  Skin - Erythematous, tender area around the gluteal fold, into the vaginal area.

## 2024-10-21 NOTE — DISCHARGE NOTE PROVIDER - NSDCFUSCHEDAPPT_GEN_ALL_CORE_FT
Ortega Leone  [facility] Physician [facility]  [address]  Scheduled Appointment: 11/06/2024

## 2024-10-21 NOTE — PROGRESS NOTE ADULT - NS ATTEND AMEND GEN_ALL_CORE FT
I have reviewed all pertinent clinical information and agree with the NP's note.  Any new labs, recent cultures, new imaging (if applicable) and vitals have been reviewed today.  All necessary adjustments to management have been made.  Agree with the above assessment and plan.    Khushbu Interiano MD  Attending Physician  Division of Infectious Diseases   Available via [employer] Teams

## 2024-10-21 NOTE — PROGRESS NOTE ADULT - ASSESSMENT
84 year old Nepali speaking female with a PMH of HTN, HLP, Pre-DM, CAD s/p PCI, frequent UTI BIBEMS from home to be evaluated for AMS. As per daughter, patient went to take a nap s/p neurology appointment, they went to wake her up, she was difficult to arose and appeared confused. Upon evaluation, patient AAOx3, back to baseline as per daughter. Endorses she is feeling weak, otherwise no pain or discomfort. Denies chest pain, palpitation, SOB, headache, dizziness, abdominal pain, N/V/D, dysuria, increase urinary frequency/urgency or nay other acute symptoms. In the ED, patient met sepsis criteria with Leukocytosis – WBC: 20.38, Lactic acidosis – Lactate:2.1, Tachycardic: HR-123, Febrile: T-max:101.9, Source: UTI. Troponin 299.5-->305.6. ECG- No ST changes. Cr:1.78. Received 1.5L-NS. Ofirmev, Ceftriaxone.     Problem/Plan - 1:  ·  Problem: At risk for sepsis due to urinary tract infection. E coli bacteremia  ·  Plan: In the ED, patient met sepsis criteria with Leukocytosis – WBC: 20.38, Lactic acidosis – Lactate:2.1, Tachycardic: HR-123, Febrile: T-max:101.9, Source: UTI.  UA: (+) Nitrite, Moderate- Leuk esterase, WBC>50, Moderate Bacteria  CT-Abd: Air within the wall of the urinary bladder and extending into adjacent veins. 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   - Tele  - IV-NS  - Ceftriaxone   - Tylenol for fever   - Culture E coli bacteremia  - DVT Prophylaxis.  (10/19) Repeat blood culture neg. C/W abx. Discharge planning   (10/20) low grade fevers. C/W IV abx. Clinically improving. ID F/U with abx for discharge.  (10/21) Transition to cefpodoxime on discharge to complete course. Discharge planning for home.     Problem/Plan - 2:  ·  Problem: Altered mental state.   ·  Plan: Resolved    Problem/Plan - 3:  ·  Problem: MC (acute kidney injury).   ·  Plan: Cr: 1.78  - IV-NS  - Monitor Kidney function   - Avoid nephrotoxic drugs.    Problem/Plan - 4:  ·  Problem: Elevated troponin.   ·  Plan: Troponin 299.5--> 305  Unlikely ischemic.  ECG: Tachycardia - No ST changes   - ASA   - Statin   - Trend troponin   - Heparin SubQ.    Problem/Plan - 5:  ·  Problem: Hypertension.   ·  Plan: Continue home meds   - held HCTZ 12.5mg   - held Met succ 50mg   - Monitor BP.

## 2024-10-22 ENCOUNTER — TRANSCRIPTION ENCOUNTER (OUTPATIENT)
Age: 84
End: 2024-10-22

## 2024-10-22 VITALS
TEMPERATURE: 98 F | RESPIRATION RATE: 18 BRPM | OXYGEN SATURATION: 98 % | HEART RATE: 100 BPM | SYSTOLIC BLOOD PRESSURE: 140 MMHG | DIASTOLIC BLOOD PRESSURE: 80 MMHG

## 2024-10-22 PROCEDURE — 99239 HOSP IP/OBS DSCHRG MGMT >30: CPT

## 2024-10-22 RX ORDER — METOPROLOL TARTRATE 50 MG
1 TABLET ORAL
Qty: 30 | Refills: 0
Start: 2024-10-22 | End: 2024-11-20

## 2024-10-22 RX ORDER — CEFPODOXIME PROXETIL 200 MG/1
1 TABLET, FILM COATED ORAL
Qty: 22 | Refills: 0
Start: 2024-10-22 | End: 2024-11-01

## 2024-10-22 RX ORDER — NYSTATIN 100000 U/G
1 POWDER TOPICAL
Qty: 100 | Refills: 0
Start: 2024-10-22

## 2024-10-22 RX ORDER — HYDROCHLOROTHIAZIDE 50 MG
1 TABLET ORAL
Refills: 0 | DISCHARGE

## 2024-10-22 RX ORDER — METOPROLOL TARTRATE 50 MG
1 TABLET ORAL
Refills: 0 | DISCHARGE

## 2024-10-22 RX ORDER — TRIAMCINOLONE ACETONIDE/L.S.B. 0.5 %
1 CREAM (GRAM) TOPICAL
Qty: 100 | Refills: 0
Start: 2024-10-22

## 2024-10-22 RX ORDER — LOSARTAN POTASSIUM 25 MG/1
1 TABLET ORAL
Refills: 0 | DISCHARGE

## 2024-10-22 RX ADMIN — Medication 1 APPLICATION(S): at 06:07

## 2024-10-22 RX ADMIN — Medication 25 MILLIGRAM(S): at 06:08

## 2024-10-22 RX ADMIN — HEPARIN SODIUM 5000 UNIT(S): 10000 INJECTION INTRAVENOUS; SUBCUTANEOUS at 15:49

## 2024-10-22 RX ADMIN — NYSTATIN 1 APPLICATION(S): 100000 POWDER TOPICAL at 06:07

## 2024-10-22 RX ADMIN — NYSTATIN 1 APPLICATION(S): 100000 POWDER TOPICAL at 17:52

## 2024-10-22 RX ADMIN — Medication 100 MILLIGRAM(S): at 15:49

## 2024-10-22 RX ADMIN — Medication 650 MILLIGRAM(S): at 06:14

## 2024-10-22 RX ADMIN — Medication 1 APPLICATION(S): at 17:53

## 2024-10-22 RX ADMIN — HEPARIN SODIUM 5000 UNIT(S): 10000 INJECTION INTRAVENOUS; SUBCUTANEOUS at 06:08

## 2024-10-22 RX ADMIN — Medication 650 MILLIGRAM(S): at 14:36

## 2024-10-22 NOTE — DISCHARGE NOTE NURSING/CASE MANAGEMENT/SOCIAL WORK - FINANCIAL ASSISTANCE
[facility] provides services at a reduced cost to those who are determined to be eligible through [facility]’s financial assistance program. Information regarding [facility]’s financial assistance program can be found by going to [email or web address] or by calling [phone].

## 2024-10-22 NOTE — DISCHARGE NOTE NURSING/CASE MANAGEMENT/SOCIAL WORK - PATIENT PORTAL LINK FT
You can access the FollowMyHealth Patient Portal offered by [facility] by registering at the following website: [email or web address]. By joining [facility]’s FollowMyHealth portal, you will also be able to view your health information using other applications (apps) compatible with our system.

## 2024-10-24 LAB
CULTURE RESULTS: SIGNIFICANT CHANGE UP
CULTURE RESULTS: SIGNIFICANT CHANGE UP
SPECIMEN SOURCE: SIGNIFICANT CHANGE UP
SPECIMEN SOURCE: SIGNIFICANT CHANGE UP

## 2024-11-06 ENCOUNTER — APPOINTMENT (OUTPATIENT)
Dept: ORTHOPEDIC SURGERY | Facility: CLINIC | Age: 84
End: 2024-11-06

## 2025-01-22 ENCOUNTER — APPOINTMENT (OUTPATIENT)
Dept: ORTHOPEDIC SURGERY | Facility: CLINIC | Age: 85
End: 2025-01-22
Payer: MEDICARE

## 2025-01-22 DIAGNOSIS — S22.081A STABLE BURST FRACTURE OF T11-T12 VERTEBRA, INITIAL ENCOUNTER FOR CLOSED FRACTURE: ICD-10-CM

## 2025-01-22 PROCEDURE — 99213 OFFICE O/P EST LOW 20 MIN: CPT

## 2025-01-22 PROCEDURE — 72070 X-RAY EXAM THORAC SPINE 2VWS: CPT

## 2025-01-22 PROCEDURE — 72100 X-RAY EXAM L-S SPINE 2/3 VWS: CPT

## 2025-02-03 ENCOUNTER — APPOINTMENT (OUTPATIENT)
Dept: MRI IMAGING | Facility: CLINIC | Age: 85
End: 2025-02-03

## 2025-02-05 ENCOUNTER — APPOINTMENT (OUTPATIENT)
Dept: MRI IMAGING | Facility: CLINIC | Age: 85
End: 2025-02-05
Payer: MEDICARE

## 2025-02-05 PROCEDURE — 72146 MRI CHEST SPINE W/O DYE: CPT

## 2025-02-07 ENCOUNTER — APPOINTMENT (OUTPATIENT)
Dept: ORTHOPEDIC SURGERY | Facility: CLINIC | Age: 85
End: 2025-02-07
Payer: MEDICARE

## 2025-02-07 DIAGNOSIS — S22.081A STABLE BURST FRACTURE OF T11-T12 VERTEBRA, INITIAL ENCOUNTER FOR CLOSED FRACTURE: ICD-10-CM

## 2025-02-07 DIAGNOSIS — S22.081G: ICD-10-CM

## 2025-02-07 PROCEDURE — 99215 OFFICE O/P EST HI 40 MIN: CPT

## 2025-03-28 ENCOUNTER — APPOINTMENT (OUTPATIENT)
Dept: ORTHOPEDIC SURGERY | Facility: CLINIC | Age: 85
End: 2025-03-28
Payer: MEDICARE

## 2025-03-28 DIAGNOSIS — S22.081G: ICD-10-CM

## 2025-03-28 DIAGNOSIS — M54.16 RADICULOPATHY, LUMBAR REGION: ICD-10-CM

## 2025-03-28 PROCEDURE — 72170 X-RAY EXAM OF PELVIS: CPT

## 2025-03-28 PROCEDURE — 72110 X-RAY EXAM L-2 SPINE 4/>VWS: CPT

## 2025-03-28 PROCEDURE — 99214 OFFICE O/P EST MOD 30 MIN: CPT

## 2025-03-28 PROCEDURE — 72070 X-RAY EXAM THORAC SPINE 2VWS: CPT

## 2025-04-08 ENCOUNTER — APPOINTMENT (OUTPATIENT)
Dept: MRI IMAGING | Facility: CLINIC | Age: 85
End: 2025-04-08
Payer: MEDICARE

## 2025-04-08 PROCEDURE — 72148 MRI LUMBAR SPINE W/O DYE: CPT

## 2025-04-09 ENCOUNTER — APPOINTMENT (OUTPATIENT)
Dept: ORTHOPEDIC SURGERY | Facility: CLINIC | Age: 85
End: 2025-04-09
Payer: MEDICARE

## 2025-04-09 PROCEDURE — 99215 OFFICE O/P EST HI 40 MIN: CPT

## 2025-04-12 ENCOUNTER — NON-APPOINTMENT (OUTPATIENT)
Age: 85
End: 2025-04-12

## 2025-04-14 ENCOUNTER — APPOINTMENT (OUTPATIENT)
Dept: PAIN MANAGEMENT | Facility: CLINIC | Age: 85
End: 2025-04-14
Payer: MEDICARE

## 2025-04-14 VITALS — BODY MASS INDEX: 40.04 KG/M2 | HEIGHT: 56 IN | WEIGHT: 178 LBS

## 2025-04-14 DIAGNOSIS — S22.081G: ICD-10-CM

## 2025-04-14 DIAGNOSIS — M54.16 RADICULOPATHY, LUMBAR REGION: ICD-10-CM

## 2025-04-14 PROCEDURE — 99204 OFFICE O/P NEW MOD 45 MIN: CPT

## 2025-05-05 ENCOUNTER — INPATIENT (INPATIENT)
Facility: HOSPITAL | Age: 85
LOS: 2 days | Discharge: HOME HEALTH | End: 2025-05-08
Attending: INTERNAL MEDICINE | Admitting: INTERNAL MEDICINE
Payer: MEDICARE

## 2025-05-05 VITALS
TEMPERATURE: 98 F | SYSTOLIC BLOOD PRESSURE: 105 MMHG | RESPIRATION RATE: 18 BRPM | HEART RATE: 83 BPM | WEIGHT: 164.24 LBS | OXYGEN SATURATION: 100 % | HEIGHT: 60 IN | DIASTOLIC BLOOD PRESSURE: 67 MMHG

## 2025-05-05 LAB
ALBUMIN SERPL ELPH-MCNC: 2.9 G/DL — LOW (ref 3.3–5)
ALP SERPL-CCNC: 138 U/L — HIGH (ref 40–120)
ALT FLD-CCNC: 18 U/L — SIGNIFICANT CHANGE UP (ref 12–78)
ANION GAP SERPL CALC-SCNC: 8 MMOL/L — SIGNIFICANT CHANGE UP (ref 5–17)
APPEARANCE UR: CLEAR — SIGNIFICANT CHANGE UP
APTT BLD: 29.9 SEC — SIGNIFICANT CHANGE UP (ref 26.1–36.8)
AST SERPL-CCNC: 24 U/L — SIGNIFICANT CHANGE UP (ref 15–37)
BASOPHILS # BLD AUTO: 0.05 K/UL — SIGNIFICANT CHANGE UP (ref 0–0.2)
BASOPHILS NFR BLD AUTO: 0.6 % — SIGNIFICANT CHANGE UP (ref 0–2)
BILIRUB SERPL-MCNC: 0.4 MG/DL — SIGNIFICANT CHANGE UP (ref 0.2–1.2)
BILIRUB UR-MCNC: NEGATIVE — SIGNIFICANT CHANGE UP
BUN SERPL-MCNC: 20 MG/DL — SIGNIFICANT CHANGE UP (ref 7–23)
CALCIUM SERPL-MCNC: 10.4 MG/DL — HIGH (ref 8.5–10.1)
CHLORIDE SERPL-SCNC: 102 MMOL/L — SIGNIFICANT CHANGE UP (ref 96–108)
CO2 SERPL-SCNC: 27 MMOL/L — SIGNIFICANT CHANGE UP (ref 22–31)
COLOR SPEC: YELLOW — SIGNIFICANT CHANGE UP
CREAT SERPL-MCNC: 1.47 MG/DL — HIGH (ref 0.5–1.3)
DIFF PNL FLD: NEGATIVE — SIGNIFICANT CHANGE UP
EGFR: 35 ML/MIN/1.73M2 — LOW
EGFR: 35 ML/MIN/1.73M2 — LOW
EOSINOPHIL # BLD AUTO: 0.28 K/UL — SIGNIFICANT CHANGE UP (ref 0–0.5)
EOSINOPHIL NFR BLD AUTO: 3.2 % — SIGNIFICANT CHANGE UP (ref 0–6)
GLUCOSE SERPL-MCNC: 111 MG/DL — HIGH (ref 70–99)
GLUCOSE UR QL: NEGATIVE MG/DL — SIGNIFICANT CHANGE UP
HCT VFR BLD CALC: 37.1 % — SIGNIFICANT CHANGE UP (ref 34.5–45)
HGB BLD-MCNC: 11.9 G/DL — SIGNIFICANT CHANGE UP (ref 11.5–15.5)
IMM GRANULOCYTES NFR BLD AUTO: 0.6 % — SIGNIFICANT CHANGE UP (ref 0–0.9)
INR BLD: 1.11 RATIO — SIGNIFICANT CHANGE UP (ref 0.85–1.16)
KETONES UR-MCNC: NEGATIVE MG/DL — SIGNIFICANT CHANGE UP
LEUKOCYTE ESTERASE UR-ACNC: ABNORMAL
LYMPHOCYTES # BLD AUTO: 1.89 K/UL — SIGNIFICANT CHANGE UP (ref 1–3.3)
LYMPHOCYTES # BLD AUTO: 21.7 % — SIGNIFICANT CHANGE UP (ref 13–44)
MCHC RBC-ENTMCNC: 29 PG — SIGNIFICANT CHANGE UP (ref 27–34)
MCHC RBC-ENTMCNC: 32.1 G/DL — SIGNIFICANT CHANGE UP (ref 32–36)
MCV RBC AUTO: 90.5 FL — SIGNIFICANT CHANGE UP (ref 80–100)
MONOCYTES # BLD AUTO: 0.78 K/UL — SIGNIFICANT CHANGE UP (ref 0–0.9)
MONOCYTES NFR BLD AUTO: 9 % — SIGNIFICANT CHANGE UP (ref 2–14)
NEUTROPHILS # BLD AUTO: 5.65 K/UL — SIGNIFICANT CHANGE UP (ref 1.8–7.4)
NEUTROPHILS NFR BLD AUTO: 64.9 % — SIGNIFICANT CHANGE UP (ref 43–77)
NITRITE UR-MCNC: POSITIVE
NRBC BLD AUTO-RTO: 0 /100 WBCS — SIGNIFICANT CHANGE UP (ref 0–0)
PH UR: 7 — SIGNIFICANT CHANGE UP (ref 5–8)
PLATELET # BLD AUTO: 279 K/UL — SIGNIFICANT CHANGE UP (ref 150–400)
POTASSIUM SERPL-MCNC: 3.4 MMOL/L — LOW (ref 3.5–5.3)
POTASSIUM SERPL-SCNC: 3.4 MMOL/L — LOW (ref 3.5–5.3)
PROT SERPL-MCNC: 7.7 GM/DL — SIGNIFICANT CHANGE UP (ref 6–8.3)
PROT UR-MCNC: NEGATIVE MG/DL — SIGNIFICANT CHANGE UP
PROTHROM AB SERPL-ACNC: 12.9 SEC — SIGNIFICANT CHANGE UP (ref 9.9–13.4)
RBC # BLD: 4.1 M/UL — SIGNIFICANT CHANGE UP (ref 3.8–5.2)
RBC # FLD: 14.4 % — SIGNIFICANT CHANGE UP (ref 10.3–14.5)
SODIUM SERPL-SCNC: 137 MMOL/L — SIGNIFICANT CHANGE UP (ref 135–145)
SP GR SPEC: >1.03 — HIGH (ref 1–1.03)
TROPONIN I, HIGH SENSITIVITY RESULT: 8.3 NG/L — SIGNIFICANT CHANGE UP
UROBILINOGEN FLD QL: 0.2 MG/DL — SIGNIFICANT CHANGE UP (ref 0.2–1)
WBC # BLD: 8.7 K/UL — SIGNIFICANT CHANGE UP (ref 3.8–10.5)
WBC # FLD AUTO: 8.7 K/UL — SIGNIFICANT CHANGE UP (ref 3.8–10.5)

## 2025-05-05 PROCEDURE — 70498 CT ANGIOGRAPHY NECK: CPT | Mod: 26

## 2025-05-05 PROCEDURE — 71045 X-RAY EXAM CHEST 1 VIEW: CPT | Mod: 26

## 2025-05-05 PROCEDURE — 70496 CT ANGIOGRAPHY HEAD: CPT | Mod: 26

## 2025-05-05 PROCEDURE — 99285 EMERGENCY DEPT VISIT HI MDM: CPT

## 2025-05-05 PROCEDURE — 99223 1ST HOSP IP/OBS HIGH 75: CPT

## 2025-05-05 PROCEDURE — 0042T: CPT

## 2025-05-05 PROCEDURE — 70450 CT HEAD/BRAIN W/O DYE: CPT | Mod: 26

## 2025-05-05 RX ORDER — METOPROLOL SUCCINATE 50 MG/1
1 TABLET, EXTENDED RELEASE ORAL
Refills: 0 | DISCHARGE

## 2025-05-05 RX ORDER — ASPIRIN 325 MG
81 TABLET ORAL DAILY
Refills: 0 | Status: DISCONTINUED | OUTPATIENT
Start: 2025-05-05 | End: 2025-05-08

## 2025-05-05 RX ORDER — LOSARTAN POTASSIUM 100 MG/1
1 TABLET, FILM COATED ORAL
Refills: 0 | DISCHARGE

## 2025-05-05 RX ORDER — CEFTRIAXONE 500 MG/1
1000 INJECTION, POWDER, FOR SOLUTION INTRAMUSCULAR; INTRAVENOUS ONCE
Refills: 0 | Status: COMPLETED | OUTPATIENT
Start: 2025-05-05 | End: 2025-05-05

## 2025-05-05 RX ORDER — CEFTRIAXONE 500 MG/1
1000 INJECTION, POWDER, FOR SOLUTION INTRAMUSCULAR; INTRAVENOUS EVERY 24 HOURS
Refills: 0 | Status: DISCONTINUED | OUTPATIENT
Start: 2025-05-06 | End: 2025-05-08

## 2025-05-05 RX ORDER — CEFTRIAXONE 500 MG/1
INJECTION, POWDER, FOR SOLUTION INTRAMUSCULAR; INTRAVENOUS
Refills: 0 | Status: DISCONTINUED | OUTPATIENT
Start: 2025-05-05 | End: 2025-05-08

## 2025-05-05 RX ORDER — MELATONIN 5 MG
3 TABLET ORAL AT BEDTIME
Refills: 0 | Status: DISCONTINUED | OUTPATIENT
Start: 2025-05-05 | End: 2025-05-08

## 2025-05-05 RX ORDER — MAGNESIUM, ALUMINUM HYDROXIDE 200-200 MG
30 TABLET,CHEWABLE ORAL EVERY 4 HOURS
Refills: 0 | Status: DISCONTINUED | OUTPATIENT
Start: 2025-05-05 | End: 2025-05-07

## 2025-05-05 RX ORDER — OMEPRAZOLE 20 MG/1
1 CAPSULE, DELAYED RELEASE ORAL
Refills: 0 | DISCHARGE

## 2025-05-05 RX ORDER — ATORVASTATIN CALCIUM 80 MG/1
80 TABLET, FILM COATED ORAL AT BEDTIME
Refills: 0 | Status: DISCONTINUED | OUTPATIENT
Start: 2025-05-05 | End: 2025-05-08

## 2025-05-05 RX ORDER — ACETAMINOPHEN 500 MG/5ML
650 LIQUID (ML) ORAL EVERY 6 HOURS
Refills: 0 | Status: DISCONTINUED | OUTPATIENT
Start: 2025-05-05 | End: 2025-05-08

## 2025-05-05 RX ORDER — CLOPIDOGREL BISULFATE 75 MG/1
75 TABLET, FILM COATED ORAL DAILY
Refills: 0 | Status: DISCONTINUED | OUTPATIENT
Start: 2025-05-05 | End: 2025-05-07

## 2025-05-05 NOTE — ED ADULT TRIAGE NOTE - ACCOMPANIED BY
"Preventive Care Visit  [facility] CHDIIMOADRIÁN Sharma MD, Internal Medicine  Jul 24, 2024      Assessment & Plan     Encounter for Medicare annual wellness exam    - Comprehensive metabolic panel (BMP + Alb, Alk Phos, ALT, AST, Total. Bili, TP); Future  - Comprehensive metabolic panel (BMP + Alb, Alk Phos, ALT, AST, Total. Bili, TP)    Need for Tdap vaccination  At pharmacy    CKD (chronic kidney disease) stage 3, GFR 30-59 ml/min (H)  Stable    Hyperlipidemia LDL goal <160    - Lipid panel reflex to direct LDL Fasting; Future  - Lipid panel reflex to direct LDL Fasting    Irritable bowel syndrome with diarrhea  Refill  - amitriptyline (ELAVIL) 10 MG tablet; TAKE ONE TABLET BY MOUTH AT BEDTIME    Intention tremor  Refill  - propranolol ER (INDERAL LA) 120 MG 24 hr capsule; Take 1 capsule (120 mg) by mouth daily    Microalbuminuria    - Albumin Random Urine Quantitative with Creat Ratio; Future  - Albumin Random Urine Quantitative with Creat Ratio            BMI  Estimated body mass index is 28.97 kg/m  as calculated from the following:    Height as of this encounter: 1.632 m (5' 4.25\").    Weight as of this encounter: 77.2 kg (170 lb 1.6 oz).   Weight management plan: Discussed healthy diet and exercise guidelines    Counseling  Appropriate preventive services were addressed with this patient via screening, questionnaire, or discussion as appropriate for fall prevention, nutrition, physical activity, Tobacco-use cessation, weight loss and cognition.  Checklist reviewing preventive services available has been given to the patient.          Cristina Novoa is a 84 year old, presenting for the following:  Medicare Visit and Blood Draw (Fasting labs)        7/24/2024     9:11 AM   Additional Questions   Roomed by Lisa Magill, CMA   Accompanied by self         7/24/2024     9:11 AM   Patient Reported Additional Medications   Patient reports taking the following new medications NONE         Health " Care Directive  Patient has a Health Care Directive on file      Annual Wellness    Has been on propranolol for ET and anxiety and would like to continue this.    Diarrhea  Has been taking imodium PRN  Diarrhea started for several years now and has diarrhea when feeling nervous      Propranolol ER questions   Stomach issues         7/24/2024   General Health   How would you rate your overall physical health? Good            7/24/2024   Nutrition   Diet: Regular (no restrictions)            7/24/2024   Exercise   Days per week of moderate/strenous exercise 1 day      (!) EXERCISE CONCERN      7/24/2024   Social Factors   Frequency of gathering with friends or relatives Once a week   Worry food won't last until get money to buy more No   Food not last or not have enough money for food? No   Do you have housing? (Housing is defined as stable permanent housing and does not include staying ouside in a car, in a tent, in an abandoned building, in an overnight shelter, or couch-surfing.) Yes   Are you worried about losing your housing? No   Lack of transportation? No   Unable to get utilities (heat,electricity)? No            7/24/2024   Fall Risk   Fallen 2 or more times in the past year? No    No   Trouble with walking or balance? No    No       Multiple values from one day are sorted in reverse-chronological order          7/24/2024   Activities of Daily Living- Home Safety   Needs help with the following daily activites None of the above   Safety concerns in the home None of the above            7/24/2024   Dental   Dentist two times every year? Yes            7/24/2024   Hearing Screening   Hearing concerns? (!) I NEED TO ASK PEOPLE TO SPEAK UP OR REPEAT THEMSELVES.            7/24/2024   Driving Risk Screening   Patient/family members have concerns about driving No            7/24/2024   General Alertness/Fatigue Screening   Have you been more tired than usual lately? No            7/24/2024   Urinary Incontinence  Screening   Bothered by leaking urine in past 6 months No            7/24/2024   TB Screening   Were you born outside of the [place]? No            Today's PHQ-2 Score:       7/24/2024     9:08 AM   PHQ-2 ( 1999 [employer])   Q1: Little interest or pleasure in doing things 0   Q2: Feeling down, depressed or hopeless 0   PHQ-2 Score 0   Q1: Little interest or pleasure in doing things Not at all   Q2: Feeling down, depressed or hopeless Not at all   PHQ-2 Score 0           7/24/2024   Substance Use   Alcohol more than 3/day or more than 7/wk No   Do you have a current opioid prescription? No   How severe/bad is pain from 1 to 10? 0/10 (No Pain)   Do you use any other substances recreationally? No        Social History     Tobacco Use    Smoking status: Never    Smokeless tobacco: Never   Vaping Use    Vaping status: Never Used   Substance Use Topics    Alcohol use: Yes     Alcohol/week: 0.0 standard drinks of alcohol     Comment: very seldom    Drug use: No           7/5/2024   LAST FHS-7 RESULTS   1st degree relative breast or ovarian cancer No                         Reviewed and updated as needed this visit by Provider                      Current providers sharing in care for this patient include:  Patient Care Team:  Mera Gupta MD as PCP - General  Mera Gupta MD as Assigned PCP    The following health maintenance items are reviewed in Epic and correct as of today:  Health Maintenance   Topic Date Due    DTAP/TDAP/TD IMMUNIZATION (2 - Td or Tdap) 03/06/2023    COVID-19 Vaccine (5 - 2023-24 season) 09/01/2023    MEDICARE ANNUAL WELLNESS VISIT  06/21/2024    BMP  06/21/2024    LIPID  06/21/2024    MICROALBUMIN  06/21/2024    ANNUAL REVIEW OF HM ORDERS  06/21/2024    HEMOGLOBIN  06/21/2024    INFLUENZA VACCINE (1) 09/01/2024    MAMMO SCREENING  07/05/2025    FALL RISK ASSESSMENT  07/24/2025    DEXA  04/12/2028    ADVANCE CARE PLANNING  07/15/2028    PHQ-2 (once per calendar year)  Completed     "Pneumococcal Vaccine: 65+ Years  Completed    URINALYSIS  Completed    ZOSTER IMMUNIZATION  Completed    RSV VACCINE (Pregnancy & 60+)  Completed    IPV IMMUNIZATION  Aged Out    HPV IMMUNIZATION  Aged Out    MENINGITIS IMMUNIZATION  Aged Out    RSV MONOCLONAL ANTIBODY  Aged Out    COLORECTAL CANCER SCREENING  Discontinued         Review of Systems  Constitutional, HEENT, cardiovascular, pulmonary, GI, , musculoskeletal, neuro, skin, endocrine and psych systems are negative, except as otherwise noted.     Objective    Exam  BP (!) 163/87 (BP Location: Right arm, Patient Position: Sitting, Cuff Size: Adult Regular)   Pulse 58   Temp 97.9  F (36.6  C) (Oral)   Resp 20   Ht 1.632 m (5' 4.25\")   Wt 77.2 kg (170 lb 1.6 oz)   LMP  (LMP Unknown)   SpO2 97%   BMI 28.97 kg/m     Estimated body mass index is 28.97 kg/m  as calculated from the following:    Height as of this encounter: 1.632 m (5' 4.25\").    Weight as of this encounter: 77.2 kg (170 lb 1.6 oz).    Physical Exam  Constitutional:       Appearance: Normal appearance.   HENT:      Head: Normocephalic and atraumatic.      Nose: Nose normal.      Mouth/Throat:      Mouth: Mucous membranes are moist.      Pharynx: Oropharynx is clear.   Eyes:      Extraocular Movements: Extraocular movements intact.      Conjunctiva/sclera: Conjunctivae normal.   Cardiovascular:      Rate and Rhythm: Normal rate and regular rhythm.      Heart sounds: Normal heart sounds.   Pulmonary:      Effort: Pulmonary effort is normal.      Breath sounds: Normal breath sounds.   Abdominal:      General: Abdomen is flat.      Palpations: Abdomen is soft.   Musculoskeletal:         General: Normal range of motion.      Cervical back: Normal range of motion and neck supple.   Skin:     General: Skin is warm.   Neurological:      General: No focal deficit present.      Mental Status: She is alert and oriented to person, place, and time. Mental status is at baseline.   Psychiatric:         " Mood and Affect: Mood and affect normal.         Speech: Speech normal.         Behavior: Behavior normal. Behavior is cooperative.         Thought Content: Thought content normal.         Cognition and Memory: Cognition normal.         Judgment: Judgment normal.               7/24/2024   Mini Cog   Clock Draw Score 2 Normal   3 Item Recall 3 objects recalled   Mini Cog Total Score 5                 Signed Electronically by: Andria Sharma MD     Immediate family member

## 2025-05-05 NOTE — ED ADULT TRIAGE NOTE - BP NONINVASIVE SYSTOLIC (MM HG)
PROCEDURES:  Robot-assisted sigmoid colectomy 23-Mar-2022 12:54:23  Morelia Brody  Small bowel resection 23-Mar-2022 12:54:32  Morelia Brody  
105

## 2025-05-05 NOTE — H&P ADULT - PROBLEM SELECTOR PLAN 1
- Onset and resolution of slurred speech and aphasia 3 times, per daughter at bedside  - CT head = No acute intracranial hemorrhage. No noncontrast CT evidence of acute ischemia  - CTA head&neck = No large vessel occlusion. No significant stenosis in the neck.  - CT perfusion imaging demonstrates 3 mL of tissue at risk in the left cerebellum, likely artifact.  - Started Plavix  - C/w home ASA  - Increase home Atorvastatin from 40mg to 80mg  - Admit to telemetry  - Fall precautions  - Neuro check q4hrs  - Permissive hypertension  - F/u A1c and lipid panel  - F/u Echo w/ bubble study  - F/u PT evaluation  - F/u MR Head  - Dr. Mitchell consulted in ED

## 2025-05-05 NOTE — ED PROVIDER NOTE - ATTENDING APP SHARED VISIT CONTRIBUTION OF CARE
85-year-old female with a past medical history of pulmonary fibrosis, hypertension, hyperlipidemia, cardiac stents, kidney stones, who presents for evaluation of intermittent episodes of dysarthria and aphasia, difficulty ambulating, onset approximately 8 AM this morning after waking up per family at bedside.  States that patient's symptoms are intermittently getting worse, with a few episodes of dysarthria throughout the day, again with the last episode at approximately 6 PM this evening.  Patient has had difficulty ambulating since at least 8 AM.  Patient has sometimes complained of left shoulder pain.  Patient denies any active pain except for some mild pain at the right lower leg with movement.  Patient's speech is clear, no drift in the upper extremities, mild drift right lower leg, with decreased strength in right leg hip flexion which is unclear if due to pain however patient does have significant difficulty ambulating independently from wheelchair to stretcher.  Patient is outside of TNK window, will activate code stroke to assess for any LVO.  Will need admission.  Patient is Irish-speaking but family at bedside assisting with translation for stroke assessment. NIHSS 1, will c/s neuro if no emergent imaging findings or alternative cause of pts symptoms

## 2025-05-05 NOTE — H&P ADULT - ASSESSMENT
Patient is a 85F, with PMHx of Pulmonary Fibrosis, HTN, HLD, GERD, who comes in with episodes of slurred speech and aphasia. Admitted for TIA.

## 2025-05-05 NOTE — H&P ADULT - HISTORY OF PRESENT ILLNESS
Patient is a 85F, with PMHx of Pulmonary Fibrosis, HTN, HLD, GERD, who comes in with episodes of slurred speech and aphasia. Daughter at bedside states, patient had episodes today where patient had slurring of speech, confusion, and had difficulty getting words out. Then the symptoms resolved. Later, it happened 2 more times. Nothing like this happened in the past. Patient endorses dysuria Patient denies headache, fever, chills, nausea, vomit, chest pain, shortness of breath, cough, lightheadedness, abdominal pain, diarrhea, constipation, dark/bloody stool, hematuria.

## 2025-05-05 NOTE — ED PROVIDER NOTE - PHYSICAL EXAMINATION
CONSTITUTIONAL: A&O x3, NAD, resting comfortably, well groomed  HEAD: Normocephalic, atraumatic.   EYES: clear sclera and conjunctiva, PERRL, EOM intact.   NECK:  Airway parent. Neck Supple.  CARDIAC: RRR, normal S1/2, no murmurs, rubs, gallops. CW non-TTP, no CW deformity.  RESPIRATORY: CTA B/L, good aeration. No wheezing, rales, adventitious breath sounds. No accessory muscle use.    ABDOMEN: soft, non-distended; non-TTP,  No RUQ/RLQ/LUQ/LLQ pain, no rebound tenderness or guarding, BS + x4 quadrants, no pulsating masses, scars. No CVA tenderness.   MSK: Moving all extremities.   SKIN: Warm, dry, color WNL, no turgor, erythema or rashes. Capillary refill < 2 seconds.   NEURO: A&Ox3, interactive, cooperative, Strength 5+/5+B/L upper &left lower extremities, 4+/5+ RLE. Sensation to light touch, pain, intact bilaterally to both upper and lower extremities. + unsteady gait.  pronator drift negative. No paresthesias.

## 2025-05-05 NOTE — ED ADULT NURSE NOTE - NSFALLHARMRISKINTERV_ED_ALL_ED

## 2025-05-05 NOTE — ED PROVIDER NOTE - CLINICAL SUMMARY MEDICAL DECISION MAKING FREE TEXT BOX
CIERA Flores: Patient is 85-year-old female with PMH pulmonary hypertension presents with daughter with complaint of woke up at 8 AM this morning with slurred speech, difficulty talking, unsteady gait, LKW 10pm last night before bed.  Daughter states she called PMD who referred her to ER.  Patient currently with improvement in slurred speech, able to answer all questions, follows all commands, states pain/weakness in RLE.   Patient is nontoxic appearing.  PE as above pertinent for RLE strength 4+/5+ as compared to 5+/5 LLE and B/LUE. NIHSS 1.   DDx concerning for but not limited to ischemic stroke vs TIA. Low concern for ACS in the absence of chest pain.   Stroke protocol ekg, labs and CT scans ordered, per family patient without history of kidney disease.  Disposition pending results and reassessment, if CTs negative and no improvement in symptoms patient may require admission to hospital for MRI brain.

## 2025-05-05 NOTE — PHARMACOTHERAPY INTERVENTION NOTE - COMMENTS
Pharmacy at bedside of Code Stroke. Patient complains of slurred speech that started today (5/5) at 08:00, after which it subsided but symptoms reappeared at 18:00. Last known wellness was last night before bed (5/4). On exam, the patient shows no focal deficits and is at baseline.    Patient takes aspirin 81 mg daily at home, but no other antiplatelet/anticoagulants.    Due to the lack of deficits on exam and presenting outside of the 4.5 hour window, she is not currently a candidate for tenecteplase    Will attain head imaging to further elucidate next steps.      Laurent Rock, PharmD  PGY-2 Critical Care Pharmacy Resident  Available via [employer] Teams     Pharmacy at bedside with ED team for Code Stroke. Patient complaining of slurred speech that started earlier today (5/5) at 08:00, after which it subsided but symptoms reappeared at 18:00. Last known well was yesterday night before bed (5/4). On exam, the patient shows no focal deficits and is at baseline.    Patient takes aspirin 81 mg daily at home, but no other antiplatelet/anticoagulants.    Due to the lack of deficits on exam and presenting outside of the 4.5 hour window, she is not currently a candidate for tenecteplase    Will attain head imaging to further elucidate next steps.    Laurent Rock, PharmD  PGY-2 Critical Care Pharmacy Resident  Available via [employer] Teams

## 2025-05-05 NOTE — ED PROVIDER NOTE - CARE PLAN
Principal Discharge DX:	Unsteady gait   1 Principal Discharge DX:	Unsteady gait  Secondary Diagnosis:	Slurred speech

## 2025-05-05 NOTE — H&P ADULT - NSHPPHYSICALEXAM_GEN_ALL_CORE
GENERAL: NAD  HEAD: Atraumatic, Normocephalic  EYES: EOMI. Conjunctiva and sclera clear  NECK: Supple  CHEST/LUNG: Clear to auscultation bilaterally; No wheeze, rhonchi, rales  HEART: Regular rate and rhythm; No murmurs  ABDOMEN: Soft, Nontender, Nondistended; Bowel sounds present  EXTREMITIES: No edema  NEURO: AAOx3, non-focal

## 2025-05-05 NOTE — PATIENT PROFILE ADULT - DOES PATIENT HAVE ADVANCE DIRECTIVE
Patient's 45-year-old white female who presents today for postop after robotic hysterectomy. Patient just got out of the hospital after being diagnosed with a pulmonary embolus. She had initially couple weeks after surgery complained of a not in her leg which turned out to be superficial thrombophlebitis DVT T was not found on Dopplers. Continued to complain of some breathing issues CT was obtained and embolus was diagnosed last week she just got out of the hospital after anticoagulation over the weekend. She's feeling better still weak. Healing from the surgeries going well she is minimal discomfort and no bleeding or discharge. Her back is still giving her some problems. Patient is noting her bladder working better. Pathology was negative reviewed with the patient    Blood pressure 126/82, pulse 76, weight 106.6 kg, last menstrual period 01/12/2017.    Abdomen: Soft nontender incisions are clean dry and intact essentially healed    Pelvic exam external genitalia within normal limits a lesions or discharge sling is in good position now on vaginal walls are not bearing down on it anymore incision at the top of the vagina is healing well clean dry and intact bimanual no palpable masses in the pelvis nontender cuff feels intact      Assessment status post robotic hysterectomy in 27 doing well patient's to refrain from intercourse for another 4 weeks call she has a bleeding or gynecologic questions   No

## 2025-05-05 NOTE — ED ADULT TRIAGE NOTE - CHIEF COMPLAINT QUOTE
Patient walk in with daughter with complaints of slurred speech and generalized weakness around 0800 and again around .   PMx: HTN. Code stroke initiated.

## 2025-05-05 NOTE — ED ADULT TRIAGE NOTE - BP NONINVASIVE DIASTOLIC (MM HG)
67 Patient is a 42-year-old female G5, P4 LMP 2023 with past medical history of  presents with vaginal bleeding x 4 days.  Patient reports she developed vaginal spotting associated with cramping lower back pain.  She states she was evaluated at OB/GYN office at onset of symptoms, where she had an ultrasound.  Patient reports IUP with fetal heartbeat was identified at that time.  Patient reports since last night, vaginal bleeding and pain more intense.  Patient reports has saturated 4 pads so far today.  Patient reports moderate intensity pelvic and lower back cramping, which patient reports is identical to what she has had contractions in the past.  She denies any fevers, chills, syncope, dysuria, cloudy urine, vaginal discharge, nausea, vomiting, diarrhea, constipation, difficulty voiding, difficulty passing bowel movements, fecal/urinary incontinence, trauma, falls, illicit drug use, ETOH abuse, h/o malignancy.

## 2025-05-05 NOTE — ED PROVIDER NOTE - PROGRESS NOTE DETAILS
NP O'Mini:CTA head and neck negative for acute infarction or LVO, labs with creatinine 1.47, EGFR less than 35, otherwise nonactionable.  Troponin 8.3.  Patient with mild improvement in RLE weakness on exam.  All results discussed with patient and family. Will plan to admit patient to hospital for MRI for rule out TIA versus CVA.  Family and patient aware of and in agreement with plan of care.

## 2025-05-06 DIAGNOSIS — J84.10 PULMONARY FIBROSIS, UNSPECIFIED: ICD-10-CM

## 2025-05-06 DIAGNOSIS — Z29.9 ENCOUNTER FOR PROPHYLACTIC MEASURES, UNSPECIFIED: ICD-10-CM

## 2025-05-06 DIAGNOSIS — I10 ESSENTIAL (PRIMARY) HYPERTENSION: ICD-10-CM

## 2025-05-06 DIAGNOSIS — E78.5 HYPERLIPIDEMIA, UNSPECIFIED: ICD-10-CM

## 2025-05-06 DIAGNOSIS — K21.9 GASTRO-ESOPHAGEAL REFLUX DISEASE WITHOUT ESOPHAGITIS: ICD-10-CM

## 2025-05-06 DIAGNOSIS — N39.0 URINARY TRACT INFECTION, SITE NOT SPECIFIED: ICD-10-CM

## 2025-05-06 LAB
A1C WITH ESTIMATED AVERAGE GLUCOSE RESULT: 6.3 % — HIGH (ref 4–5.6)
ALBUMIN SERPL ELPH-MCNC: 2.9 G/DL — LOW (ref 3.3–5)
ALP SERPL-CCNC: 151 U/L — HIGH (ref 40–120)
ALT FLD-CCNC: 16 U/L — SIGNIFICANT CHANGE UP (ref 12–78)
ANION GAP SERPL CALC-SCNC: 8 MMOL/L — SIGNIFICANT CHANGE UP (ref 5–17)
AST SERPL-CCNC: 20 U/L — SIGNIFICANT CHANGE UP (ref 15–37)
BILIRUB SERPL-MCNC: 0.4 MG/DL — SIGNIFICANT CHANGE UP (ref 0.2–1.2)
BUN SERPL-MCNC: 18 MG/DL — SIGNIFICANT CHANGE UP (ref 7–23)
CALCIUM SERPL-MCNC: 10.9 MG/DL — HIGH (ref 8.5–10.1)
CHLORIDE SERPL-SCNC: 103 MMOL/L — SIGNIFICANT CHANGE UP (ref 96–108)
CO2 SERPL-SCNC: 27 MMOL/L — SIGNIFICANT CHANGE UP (ref 22–31)
CREAT SERPL-MCNC: 1.19 MG/DL — SIGNIFICANT CHANGE UP (ref 0.5–1.3)
EGFR: 45 ML/MIN/1.73M2 — LOW
EGFR: 45 ML/MIN/1.73M2 — LOW
ESTIMATED AVERAGE GLUCOSE: 134 MG/DL — HIGH (ref 68–114)
GLUCOSE SERPL-MCNC: 95 MG/DL — SIGNIFICANT CHANGE UP (ref 70–99)
HCT VFR BLD CALC: 35.5 % — SIGNIFICANT CHANGE UP (ref 34.5–45)
HGB BLD-MCNC: 11.1 G/DL — LOW (ref 11.5–15.5)
MCHC RBC-ENTMCNC: 28.5 PG — SIGNIFICANT CHANGE UP (ref 27–34)
MCHC RBC-ENTMCNC: 31.3 G/DL — LOW (ref 32–36)
MCV RBC AUTO: 91.3 FL — SIGNIFICANT CHANGE UP (ref 80–100)
NRBC BLD AUTO-RTO: 0 /100 WBCS — SIGNIFICANT CHANGE UP (ref 0–0)
PLATELET # BLD AUTO: 256 K/UL — SIGNIFICANT CHANGE UP (ref 150–400)
POTASSIUM SERPL-MCNC: 3.9 MMOL/L — SIGNIFICANT CHANGE UP (ref 3.5–5.3)
POTASSIUM SERPL-SCNC: 3.9 MMOL/L — SIGNIFICANT CHANGE UP (ref 3.5–5.3)
PROT SERPL-MCNC: 7.8 GM/DL — SIGNIFICANT CHANGE UP (ref 6–8.3)
RBC # BLD: 3.89 M/UL — SIGNIFICANT CHANGE UP (ref 3.8–5.2)
RBC # FLD: 14.4 % — SIGNIFICANT CHANGE UP (ref 10.3–14.5)
SODIUM SERPL-SCNC: 138 MMOL/L — SIGNIFICANT CHANGE UP (ref 135–145)
WBC # BLD: 9.23 K/UL — SIGNIFICANT CHANGE UP (ref 3.8–10.5)
WBC # FLD AUTO: 9.23 K/UL — SIGNIFICANT CHANGE UP (ref 3.8–10.5)

## 2025-05-06 PROCEDURE — 99232 SBSQ HOSP IP/OBS MODERATE 35: CPT

## 2025-05-06 PROCEDURE — 70551 MRI BRAIN STEM W/O DYE: CPT | Mod: 26

## 2025-05-06 PROCEDURE — 93010 ELECTROCARDIOGRAM REPORT: CPT

## 2025-05-06 RX ORDER — PREDNISONE 20 MG/1
5 TABLET ORAL DAILY
Refills: 0 | Status: DISCONTINUED | OUTPATIENT
Start: 2025-05-06 | End: 2025-05-08

## 2025-05-06 RX ORDER — NORTRIPTYLINE HCL 75 MG
25 CAPSULE ORAL AT BEDTIME
Refills: 0 | Status: DISCONTINUED | OUTPATIENT
Start: 2025-05-06 | End: 2025-05-08

## 2025-05-06 RX ORDER — MEMANTINE HYDROCHLORIDE 21 MG/1
5 CAPSULE, EXTENDED RELEASE ORAL
Refills: 0 | Status: DISCONTINUED | OUTPATIENT
Start: 2025-05-06 | End: 2025-05-08

## 2025-05-06 RX ORDER — PREDNISONE 20 MG/1
1 TABLET ORAL
Refills: 0 | DISCHARGE

## 2025-05-06 RX ORDER — PREDNISOLONE 5 MG
1 TABLET ORAL
Refills: 0 | DISCHARGE

## 2025-05-06 RX ADMIN — Medication 25 MILLIGRAM(S): at 22:58

## 2025-05-06 RX ADMIN — PREDNISONE 5 MILLIGRAM(S): 20 TABLET ORAL at 05:13

## 2025-05-06 RX ADMIN — ATORVASTATIN CALCIUM 80 MILLIGRAM(S): 80 TABLET, FILM COATED ORAL at 22:58

## 2025-05-06 RX ADMIN — CEFTRIAXONE 100 MILLIGRAM(S): 500 INJECTION, POWDER, FOR SOLUTION INTRAMUSCULAR; INTRAVENOUS at 00:16

## 2025-05-06 RX ADMIN — CLOPIDOGREL BISULFATE 75 MILLIGRAM(S): 75 TABLET, FILM COATED ORAL at 00:16

## 2025-05-06 RX ADMIN — Medication 81 MILLIGRAM(S): at 11:21

## 2025-05-06 RX ADMIN — Medication 40 MILLIGRAM(S): at 05:12

## 2025-05-06 RX ADMIN — MEMANTINE HYDROCHLORIDE 5 MILLIGRAM(S): 21 CAPSULE, EXTENDED RELEASE ORAL at 17:47

## 2025-05-06 RX ADMIN — CEFTRIAXONE 100 MILLIGRAM(S): 500 INJECTION, POWDER, FOR SOLUTION INTRAMUSCULAR; INTRAVENOUS at 23:04

## 2025-05-06 RX ADMIN — MEMANTINE HYDROCHLORIDE 5 MILLIGRAM(S): 21 CAPSULE, EXTENDED RELEASE ORAL at 05:12

## 2025-05-06 RX ADMIN — CLOPIDOGREL BISULFATE 75 MILLIGRAM(S): 75 TABLET, FILM COATED ORAL at 11:21

## 2025-05-06 NOTE — PROGRESS NOTE ADULT - ASSESSMENT
Patient is a 85F, with PMHx of Pulmonary Fibrosis, HTN, HLD, GERD, who comes in with episodes of slurred speech and aphasia. Admitted for TIA r/o Acute Stroke-

## 2025-05-06 NOTE — CONSULT NOTE ADULT - SUBJECTIVE AND OBJECTIVE BOX
Neurology consult    JOSE M [id number]     Patient is a 85y old  Female who presents with a chief complaint of TIA (06 May 2025 10:24)      HPI:  Patient is a 85F, with PMHx of Pulmonary Fibrosis, HTN, HLD, GERD, who comes in with episodes of slurred speech and aphasia. Daughter at bedside states, patient had episodes today where patient had slurring of speech, confusion, and had difficulty getting words out. Then the symptoms resolved. Later, it happened 2 more times. Nothing like this happened in the past. Patient endorses dysuria Patient denies headache, fever, chills, nausea, vomit, chest pain, shortness of breath, cough, lightheadedness, abdominal pain, diarrhea, constipation, dark/bloody stool, hematuria. (05 May 2025 23:44)        MEDICATIONS    acetaminophen     Tablet .. 650 milliGRAM(s) Oral every 6 hours PRN  aluminum hydroxide/magnesium hydroxide/simethicone Suspension 30 milliLiter(s) Oral every 4 hours PRN  aspirin enteric coated 81 milliGRAM(s) Oral daily  atorvastatin 80 milliGRAM(s) Oral at bedtime  cefTRIAXone   IVPB      cefTRIAXone   IVPB 1000 milliGRAM(s) IV Intermittent every 24 hours  clopidogrel Tablet 75 milliGRAM(s) Oral daily  melatonin 3 milliGRAM(s) Oral at bedtime PRN  memantine 5 milliGRAM(s) Oral two times a day  nortriptyline 25 milliGRAM(s) Oral at bedtime  pantoprazole    Tablet 40 milliGRAM(s) Oral before breakfast  predniSONE   Tablet 5 milliGRAM(s) Oral daily      PMH: HTN (Hypertension)    Renal Calculi    H/O: Osteoarthritis    Recurrent UTI    Prediabetes    Pulmonary fibrosis         PSH:     Family history: No history of dementia, strokes, or seizures   FAMILY HISTORY:      SOCIAL HISTORY:  No history of tobacco or alcohol use     Allergies    No Known Allergies    Intolerances        Height (cm): 152.4 (05-05 @ 19:02)  Weight (kg): 74.5 (05-05 @ 19:17)  BMI (kg/m2): 32.1 (05-05 @ 19:17)    Vital Signs Last 24 Hrs  T(C): 36.7 (06 May 2025 09:31), Max: 37.1 (06 May 2025 06:11)  T(F): 98 (06 May 2025 09:31), Max: 98.7 (06 May 2025 06:11)  HR: 93 (06 May 2025 09:31) (83 - 93)  BP: 126/52 (06 May 2025 09:31) (105/67 - 148/57)  BP(mean): 79 (05 May 2025 22:30) (79 - 79)  RR: 20 (06 May 2025 09:31) (17 - 24)  SpO2: 94% (06 May 2025 09:31) (94% - 100%)    Parameters below as of 06 May 2025 09:31  Patient On (Oxygen Delivery Method): room air          On Neurological Examination:    Neuro: AAOx3; knows age, month, obeys commands, no dysarthria; calculations intact, fluent names, repeats     CN: PERRL, EOMI, VFF normal gaze preference, no facial palsy,     Motor: no drift in all extremeties    Sensory: Intact to LT and PP no extinction    Coordination: FTN intact b/l                                             GENERAL Exam:     Nontoxic , No Acute Distress   	  HEENT:  normocephalic, atraumatic  		  LUNGS:	Clear bilaterally  No Wheeze    	  HEART:	 Normal S1S2   No murmur RRR        	  GI/ ABDOMEN:  Soft  Non tender    EXTREMITIES:   No Edema  No Clubbing  No Cyanosis No Edema    MUSCULOSKELETAL: Normal Range of Motion  	   SKIN:      Normal   No Ecchymosis               LABS:  CBC Full  -  ( 06 May 2025 05:30 )  WBC Count : 9.23 K/uL  RBC Count : 3.89 M/uL  Hemoglobin : 11.1 g/dL  Hematocrit : 35.5 %  Platelet Count - Automated : 256 K/uL  Mean Cell Volume : 91.3 fl  Mean Cell Hemoglobin : 28.5 pg  Mean Cell Hemoglobin Concentration : 31.3 g/dL  Auto Neutrophil # : x  Auto Lymphocyte # : x  Auto Monocyte # : x  Auto Eosinophil # : x  Auto Basophil # : x  Auto Neutrophil % : x  Auto Lymphocyte % : x  Auto Monocyte % : x  Auto Eosinophil % : x  Auto Basophil % : x    Urinalysis Basic - ( 06 May 2025 05:30 )    Color: x / Appearance: x / SG: x / pH: x  Gluc: 95 mg/dL / Ketone: x  / Bili: x / Urobili: x   Blood: x / Protein: x / Nitrite: x   Leuk Esterase: x / RBC: x / WBC x   Sq Epi: x / Non Sq Epi: x / Bacteria: x      05-06    138  |  103  |  18  ----------------------------<  95  3.9   |  27  |  1.19    Ca    10.9[H]      06 May 2025 05:30    TPro  7.8  /  Alb  2.9[L]  /  TBili  0.4  /  DBili  x   /  AST  20  /  ALT  16  /  AlkPhos  151[H]  05-06    LIVER FUNCTIONS - ( 06 May 2025 05:30 )  Alb: 2.9 g/dL / Pro: 7.8 gm/dL / ALK PHOS: 151 U/L / ALT: 16 U/L / AST: 20 U/L / GGT: x           Hemoglobin A1C:       PT/INR - ( 05 May 2025 19:38 )   PT: 12.9 sec;   INR: 1.11 ratio         PTT - ( 05 May 2025 19:38 )  PTT:29.9 sec      RADIOLOGY  CTH < from: MR Head No Cont (05.06.25 @ 09:44) >  IMPRESSION: No acute hemorrhage, mass or mass effect.    < end of copied text >  < from: CT Angio Neck Stroke Protocol w/ IV Cont (05.05.25 @ 19:31) >  IMPRESSION:    No acute intracranial hemorrhage. No noncontrast CT evidence of acute   ischemia.    No large vessel occlusion. No significant stenosis in the neck.    CT perfusion imaging demonstrates 3 mL of tissue at risk in the left   cerebellum, likely artifact.    MRI is offered for further evaluation.    < end of copied text >

## 2025-05-06 NOTE — CONSULT NOTE ADULT - ASSESSMENT
85F, with PMHx of Pulmonary Fibrosis, HTN, HLD, GERD, who comes in with episodes of slurred speech and aphasia x 3 resolved. Neuro exam non-focal.   CTH, CTA, MRI no acute finding    Impression: possible TIA    MRI, CTA no acute finding  Aspirin 81  Statin titrate to LDL  A1c, LDL  Routine EEG here or as an outpt  On ceftriaxone  Can follow up with Neurology, Dr. Daljit Mitchell at [phone]

## 2025-05-06 NOTE — PROGRESS NOTE ADULT - PROBLEM SELECTOR PLAN 1
- per my colleague's documentation "Onset and resolution of slurred speech and aphasia 3 times, per daughter at bedside  - CT head = No acute intracranial hemorrhage. No noncontrast CT evidence of acute ischemia  - CTA head&neck = No large vessel occlusion. No significant stenosis in the neck.  - CT perfusion imaging demonstrates 3 mL of tissue at risk in the left cerebellum, likely artifact.  - Started Plavix  - C/w home ASA  - Increase home Atorvastatin from 40mg to 80mg  - Admit to telemetry  - Fall precautions  - Neuro check q4hrs  - Permissive hypertension  - F/u A1c and lipid panel  - F/u Echo w/ bubble study  - F/u PT evaluation  - F/u MR Head  - Dr. Mitchell consulted in ED"  5/6/2025 mri head --> negative    f/u echo as ordered by colleague and with CXR suggestive of CHF-    f/u neurology consult    f/u lipid

## 2025-05-07 LAB
ANION GAP SERPL CALC-SCNC: 6 MMOL/L — SIGNIFICANT CHANGE UP (ref 5–17)
BUN SERPL-MCNC: 14 MG/DL — SIGNIFICANT CHANGE UP (ref 7–23)
CALCIUM SERPL-MCNC: 10.5 MG/DL — HIGH (ref 8.5–10.1)
CHLORIDE SERPL-SCNC: 104 MMOL/L — SIGNIFICANT CHANGE UP (ref 96–108)
CHOLEST SERPL-MCNC: 121 MG/DL — SIGNIFICANT CHANGE UP
CO2 SERPL-SCNC: 28 MMOL/L — SIGNIFICANT CHANGE UP (ref 22–31)
CREAT SERPL-MCNC: 0.94 MG/DL — SIGNIFICANT CHANGE UP (ref 0.5–1.3)
EGFR: 59 ML/MIN/1.73M2 — LOW
EGFR: 59 ML/MIN/1.73M2 — LOW
GLUCOSE SERPL-MCNC: 127 MG/DL — HIGH (ref 70–99)
HCT VFR BLD CALC: 34.9 % — SIGNIFICANT CHANGE UP (ref 34.5–45)
HDLC SERPL-MCNC: 36 MG/DL — LOW
HGB BLD-MCNC: 11.3 G/DL — LOW (ref 11.5–15.5)
LDLC SERPL-MCNC: 57 MG/DL — SIGNIFICANT CHANGE UP
LIPID PNL WITH DIRECT LDL SERPL: 57 MG/DL — SIGNIFICANT CHANGE UP
MAGNESIUM SERPL-MCNC: 1.3 MG/DL — LOW (ref 1.6–2.6)
MCHC RBC-ENTMCNC: 28.9 PG — SIGNIFICANT CHANGE UP (ref 27–34)
MCHC RBC-ENTMCNC: 32.4 G/DL — SIGNIFICANT CHANGE UP (ref 32–36)
MCV RBC AUTO: 89.3 FL — SIGNIFICANT CHANGE UP (ref 80–100)
NONHDLC SERPL-MCNC: 85 MG/DL — SIGNIFICANT CHANGE UP
NRBC BLD AUTO-RTO: 0 /100 WBCS — SIGNIFICANT CHANGE UP (ref 0–0)
PHOSPHATE SERPL-MCNC: 2.4 MG/DL — LOW (ref 2.5–4.5)
PLATELET # BLD AUTO: 230 K/UL — SIGNIFICANT CHANGE UP (ref 150–400)
POTASSIUM SERPL-MCNC: 3.2 MMOL/L — LOW (ref 3.5–5.3)
POTASSIUM SERPL-SCNC: 3.2 MMOL/L — LOW (ref 3.5–5.3)
RBC # BLD: 3.91 M/UL — SIGNIFICANT CHANGE UP (ref 3.8–5.2)
RBC # FLD: 14.3 % — SIGNIFICANT CHANGE UP (ref 10.3–14.5)
SODIUM SERPL-SCNC: 138 MMOL/L — SIGNIFICANT CHANGE UP (ref 135–145)
TRIGL SERPL-MCNC: 169 MG/DL — HIGH
WBC # BLD: 9.51 K/UL — SIGNIFICANT CHANGE UP (ref 3.8–10.5)
WBC # FLD AUTO: 9.51 K/UL — SIGNIFICANT CHANGE UP (ref 3.8–10.5)

## 2025-05-07 PROCEDURE — 95816 EEG AWAKE AND DROWSY: CPT | Mod: 26

## 2025-05-07 PROCEDURE — 99232 SBSQ HOSP IP/OBS MODERATE 35: CPT

## 2025-05-07 PROCEDURE — 76937 US GUIDE VASCULAR ACCESS: CPT | Mod: 26

## 2025-05-07 PROCEDURE — 36000 PLACE NEEDLE IN VEIN: CPT

## 2025-05-07 RX ORDER — METOPROLOL SUCCINATE 50 MG/1
25 TABLET, EXTENDED RELEASE ORAL DAILY
Refills: 0 | Status: DISCONTINUED | OUTPATIENT
Start: 2025-05-07 | End: 2025-05-08

## 2025-05-07 RX ORDER — HEPARIN SODIUM 1000 [USP'U]/ML
5000 INJECTION INTRAVENOUS; SUBCUTANEOUS EVERY 12 HOURS
Refills: 0 | Status: DISCONTINUED | OUTPATIENT
Start: 2025-05-07 | End: 2025-05-08

## 2025-05-07 RX ORDER — MAGNESIUM SULFATE 500 MG/ML
1 SYRINGE (ML) INJECTION ONCE
Refills: 0 | Status: COMPLETED | OUTPATIENT
Start: 2025-05-07 | End: 2025-05-07

## 2025-05-07 RX ORDER — FUROSEMIDE 10 MG/ML
20 INJECTION INTRAMUSCULAR; INTRAVENOUS DAILY
Refills: 0 | Status: DISCONTINUED | OUTPATIENT
Start: 2025-05-07 | End: 2025-05-08

## 2025-05-07 RX ORDER — POTASSIUM PHOSPHATE, MONOBASIC POTASSIUM PHOSPHATE, DIBASIC INJECTION, 236; 224 MG/ML; MG/ML
15 SOLUTION, CONCENTRATE INTRAVENOUS ONCE
Refills: 0 | Status: COMPLETED | OUTPATIENT
Start: 2025-05-07 | End: 2025-05-07

## 2025-05-07 RX ADMIN — PREDNISONE 5 MILLIGRAM(S): 20 TABLET ORAL at 06:28

## 2025-05-07 RX ADMIN — Medication 20 MILLIEQUIVALENT(S): at 17:54

## 2025-05-07 RX ADMIN — POTASSIUM PHOSPHATE, MONOBASIC POTASSIUM PHOSPHATE, DIBASIC INJECTION, 62.5 MILLIMOLE(S): 236; 224 SOLUTION, CONCENTRATE INTRAVENOUS at 13:42

## 2025-05-07 RX ADMIN — HEPARIN SODIUM 5000 UNIT(S): 1000 INJECTION INTRAVENOUS; SUBCUTANEOUS at 17:56

## 2025-05-07 RX ADMIN — Medication 81 MILLIGRAM(S): at 13:12

## 2025-05-07 RX ADMIN — MEMANTINE HYDROCHLORIDE 5 MILLIGRAM(S): 21 CAPSULE, EXTENDED RELEASE ORAL at 06:28

## 2025-05-07 RX ADMIN — Medication 25 MILLIGRAM(S): at 21:40

## 2025-05-07 RX ADMIN — MEMANTINE HYDROCHLORIDE 5 MILLIGRAM(S): 21 CAPSULE, EXTENDED RELEASE ORAL at 17:55

## 2025-05-07 RX ADMIN — ATORVASTATIN CALCIUM 80 MILLIGRAM(S): 80 TABLET, FILM COATED ORAL at 21:40

## 2025-05-07 RX ADMIN — Medication 650 MILLIGRAM(S): at 10:14

## 2025-05-07 RX ADMIN — FUROSEMIDE 20 MILLIGRAM(S): 10 INJECTION INTRAMUSCULAR; INTRAVENOUS at 13:24

## 2025-05-07 RX ADMIN — Medication 40 MILLIGRAM(S): at 06:28

## 2025-05-07 RX ADMIN — CLOPIDOGREL BISULFATE 75 MILLIGRAM(S): 75 TABLET, FILM COATED ORAL at 13:12

## 2025-05-07 RX ADMIN — CEFTRIAXONE 100 MILLIGRAM(S): 500 INJECTION, POWDER, FOR SOLUTION INTRAMUSCULAR; INTRAVENOUS at 23:40

## 2025-05-07 RX ADMIN — METOPROLOL SUCCINATE 25 MILLIGRAM(S): 50 TABLET, EXTENDED RELEASE ORAL at 17:55

## 2025-05-07 RX ADMIN — Medication 100 GRAM(S): at 10:09

## 2025-05-07 NOTE — DIETITIAN INITIAL EVALUATION ADULT - PERTINENT MEDS FT
MEDICATIONS  (STANDING):  aspirin enteric coated 81 milliGRAM(s) Oral daily  atorvastatin 80 milliGRAM(s) Oral at bedtime  cefTRIAXone   IVPB      cefTRIAXone   IVPB 1000 milliGRAM(s) IV Intermittent every 24 hours  furosemide   Injectable 20 milliGRAM(s) IV Push daily  heparin   Injectable 5000 Unit(s) SubCutaneous every 12 hours  memantine 5 milliGRAM(s) Oral two times a day  metoprolol succinate ER 25 milliGRAM(s) Oral daily  nortriptyline 25 milliGRAM(s) Oral at bedtime  pantoprazole    Tablet 40 milliGRAM(s) Oral before breakfast  potassium chloride    Tablet ER 20 milliEquivalent(s) Oral once  predniSONE   Tablet 5 milliGRAM(s) Oral daily    MEDICATIONS  (PRN):  acetaminophen     Tablet .. 650 milliGRAM(s) Oral every 6 hours PRN Temp greater or equal to 38C (100.4F), Mild Pain (1 - 3)  melatonin 3 milliGRAM(s) Oral at bedtime PRN Insomnia

## 2025-05-07 NOTE — DIETITIAN INITIAL EVALUATION ADULT - ORAL NUTRITION SUPPLEMENTS
Recommend Ensure 1x daily (provides 350 kcal, 20 g protein/ 8oz)   Recommend LPS 1x daily (provides 100 kcal, 15 g protein)

## 2025-05-07 NOTE — PHYSICAL THERAPY INITIAL EVALUATION ADULT - GENERAL OBSERVATIONS, REHAB EVAL
Pt found semisupine in bed, +primafit +telemetry. Pt refused Language Line  and requested daughter Inga at bedside to translate. Pt found semisupine in bed, +primafit +telemetry. HR between 116-121bpm with activity. Pt refused Language Line  and requested daughter Inga at bedside to translate.

## 2025-05-07 NOTE — DIETITIAN INITIAL EVALUATION ADULT - PROBLEM SELECTOR PROBLEM 7
Kev Padron MD recommends the following:    No changes at this time. Please continue with your current treatment. Call our office with any questions or concerns.       Kev Padron MD would like to see you back in 6 months.     It was nice to see you today. Take Care.      Prophylactic measure

## 2025-05-07 NOTE — DIETITIAN INITIAL EVALUATION ADULT - PERTINENT LABORATORY DATA
05-07    138  |  104  |  14  ----------------------------<  127[H]  3.2[L]   |  28  |  0.94    Ca    10.5[H]      07 May 2025 06:05  Phos  2.4     05-07  Mg     1.3     05-07    TPro  7.8  /  Alb  2.9[L]  /  TBili  0.4  /  DBili  x   /  AST  20  /  ALT  16  /  AlkPhos  151[H]  05-06  A1C with Estimated Average Glucose Result: 6.3 % (05-06-25 @ 05:30)  A1C with Estimated Average Glucose Result: 6.6 % (10-18-24 @ 06:30)

## 2025-05-07 NOTE — DIETITIAN INITIAL EVALUATION ADULT - SIGNS/SYMPTOMS
stage 2 pressure injury to sacrum  7% wt loss x 2 months, consuming less than 75% of estimated energy needs for > 7 days

## 2025-05-07 NOTE — DIETITIAN INITIAL EVALUATION ADULT - OTHER INFO
Pt daughter present at bedside and provided all information during interview (pt declined  services at this time). Daughter states pt lives with her and daughter cooks/food shops. Reports pt follows a regular diet, reports poor PO intake x 3 weeks PTA, supplementing with Ensure 1x daily. Wears full upper and lower dentition, reports difficulty chewing/swallowing on Monday (5/5) s/p TIA, reports has since resolved. Reports usual body weight ~ 180 lbs x 2 months ago. Pt currently weighs ~ 168 lbs. Wt trend reflects ~7% wt loss x 2 months (not clinically significant). Denies any N/V/D/C and abdominal pain, Pt/daughter made aware RD remains available.  Pt daughter present at bedside and provided all information during interview (pt declined  services at this time). Daughter states pt lives with her and daughter cooks/food shops. Reports pt follows a regular diet, reports poor PO intake x 3 weeks PTA, supplementing with Ensure 1x daily. Wears full upper and lower dentition, reports difficulty chewing/swallowing on Monday (5/5) s/p TIA, reports has since resolved. Reports usual body weight ~ 180 lbs x 2 months ago. Pt currently weighs ~ 168 lbs. Wt trend reflects ~7% wt loss x 2 months. Denies any N/V/D/C and abdominal pain, Pt/daughter made aware RD remains available.

## 2025-05-07 NOTE — PHYSICAL THERAPY INITIAL EVALUATION ADULT - GAIT DEVIATIONS NOTED, PT EVAL
decreased nahun/decreased velocity of limb motion/decreased weight-shifting ability Limited WB on L LE due to buckling/pain./decreased nahun/decreased velocity of limb motion/decreased weight-shifting ability

## 2025-05-07 NOTE — DIETITIAN INITIAL EVALUATION ADULT - ADD RECOMMEND
1. Hypokalemia, hypomagnesemia, and hypophosphatemia/ diuretic rx noted. Monitor electrolytes and replenish PRN   2. Recommend multivitamin and ascorbic acid to aide in wound healing   3. Pantoprazole Rx noted, monitor B12, calcium and magnesium and replenish PRN

## 2025-05-07 NOTE — DIETITIAN NUTRITION RISK NOTIFICATION - TREATMENT: THE FOLLOWING DIET HAS BEEN RECOMMENDED
Diet, DASH/TLC:   Sodium & Cholesterol Restricted  Easy to Chew (EASYTOCHEW)  Liquid Protein Supplement     Qty per Day:  1  Supplement Feeding Modality:  Oral  Ensure Plus High Protein Cans or Servings Per Day:  1       Frequency:  Daily (05-07-25 @ 15:52) [Pending Verification By Attending]  Diet, Easy to Chew:   DASH/TLC {Sodium & Cholesterol Restricted} (05-05-25 @ 22:13) [Active]

## 2025-05-07 NOTE — PHYSICAL THERAPY INITIAL EVALUATION ADULT - PERTINENT HX OF CURRENT PROBLEM, REHAB EVAL
Patient is a 85F, with PMHx of Pulmonary Fibrosis, HTN, HLD, GERD, who comes in with episodes of slurred speech and aphasia. Daughter at bedside states, patient had episodes today where patient had slurring of speech, confusion, and had difficulty getting words out. Then the symptoms resolved. Later, it happened 2 more times. Nothing like this happened in the past.

## 2025-05-07 NOTE — DIETITIAN INITIAL EVALUATION ADULT - HEIGHT FOR BMI (FEET)
AAOx4, skin W/D/P, cap refill<3 sec, MAEW, NAD, gait steady ,  no assistance needed upon discharge. Wheelchair and assistance offered, patient declined.      4

## 2025-05-07 NOTE — EEG REPORT - NS EEG TEXT BOX
JOSE M ZHOU [id number]     Study Date: 		05-07-25  Duration: 23min  --------------------------------------------------------------------------------------------------  History:  CC/ HPI Patient is a 85y old  Female who presents with a chief complaint of TIA (07 May 2025 12:00)    MEDICATIONS  (STANDING):  --------------------------------------------------------------------------------------------------  Study Interpretation:    [[[Abbreviation Key:  PDR=alpha rhythm/posterior dominant rhythm. A-P=anterior posterior gradient.  Amplitude: ‘very low’:<20; ‘low’:20-50; ‘medium’:; ‘high’:>200uV.  Persistence for periodic/rhythmic patterns (% of epoch) ‘rare’:<1%; ‘occasional’:1-10%; ‘frequent’:10-50%; ‘abundant’:50-90%; ‘continuous’:>90%.  Persistence for sporadic discharges: ‘rare’:<1/hr; ‘occasional’:1/min-1/hr; ‘frequent’:>1/min; ‘abundant’:>1/10 sec.  GRDA=generalized rhythmic delta activity; FIRDA=frontal intermittent GRDA; LRDA=lateralized rhythmic delta activity; TIRDA=temporal intermittent rhythmic delta activity;  LPD=PLED=lateralized periodic discharges; GPD=generalized periodic discharges; BiPDs=BiPLEDs=bilateral independent periodic epileptiform discharges; SIRPID=stimulus induced rhythmic, periodic, or ictal appearing discharges; BIRDs=brief potentially ictal rhythmic discharges >4 Hz, lasting .5-10s; PFA=paroxysmal bursts of beta/gamma; LVFA=low voltage fast activity.  Modifiers: +F=with fast component; +S=with spike component; +R=with rhythmic component.  S-B=burst suppression pattern.  Max=maximal. N1-drowsy; N2-stage II sleep; N3-slow wave sleep. SSS/BETS=small sharp spikes/benign epileptiform transients of sleep. HV=hyperventilation; PS=photic stimulation]]]    Daily EEG Visual Analysis    FINDINGS:      Background:  Continuity: continuous  Symmetry: symmetric  PDR: 10 Hz activity, with amplitude to 40 uV, that attenuated to eye opening.  Low amplitude frontal beta noted in wakefulness.  Reactivity: present  Voltage: normal, mostly 20-150uV  Anterior Posterior Gradient: present  Other background findings: none  Breach: absent    Background Slowing:  Generalized slowing: none was present.  Focal slowing: intermittent polymorphic delta slowing, left temporal region     State Changes:   -Drowsiness noted with increased slowing, attenuation of fast activity, vertex transients.  -Present with N2 sleep transients with symmetric spindles and K-complexes.    Sporadic Epileptiform Discharges:    None    Rhythmic and Periodic Patterns (RPPs):  None     Electrographic and Electroclinical seizures:  None    Other Clinical Events:  None    Activation Procedures:   -Hyperventilation was not performed.    -Photic stimulation was performed and did not elicit any abnormalities.      Artifacts:  Intermittent myogenic and movement artifacts were noted.    ECG:  The heart rate on single channel ECG was predominantly between 110-130 BPM.    EEG Classification / Summary:   Abnormal  EEG in the awake / drowsy / asleep states:   1.  intermittent polymorphic delta slowing, focal, left temporal region     Clinical Impression:   1. Focal cerebral dysfunction, left temporal region   2. sinus tachycardia  3. There were no epileptiform abnormalities recorded.      *PRELIM READ, ATTENDING REVIEW PENDING*       -------------------------------------------------------------------------------------------------------  [facility] EEG Reading Room Ph#: [phone]  Epilepsy Answering Service after 5PM and before 8:30AM: Ph#: [phone]    Russ Thompson DO   Epilepsy Fellow    JOSE M ZHOU [id number]     Study Date: 05-07-25  Duration: 23 min  --------------------------------------------------------------------------------------------------  History:  CC/ HPI Patient is a 85y old  Female who presents with a chief complaint of TIA (07 May 2025 12:00)    MEDICATIONS  (STANDING):  --------------------------------------------------------------------------------------------------  Study Interpretation:    [[[Abbreviation Key:  PDR=alpha rhythm/posterior dominant rhythm. A-P=anterior posterior gradient.  Amplitude: ‘very low’:<20; ‘low’:20-50; ‘medium’:; ‘high’:>200uV.  Persistence for periodic/rhythmic patterns (% of epoch) ‘rare’:<1%; ‘occasional’:1-10%; ‘frequent’:10-50%; ‘abundant’:50-90%; ‘continuous’:>90%.  Persistence for sporadic discharges: ‘rare’:<1/hr; ‘occasional’:1/min-1/hr; ‘frequent’:>1/min; ‘abundant’:>1/10 sec.  GRDA=generalized rhythmic delta activity; FIRDA=frontal intermittent GRDA; LRDA=lateralized rhythmic delta activity; TIRDA=temporal intermittent rhythmic delta activity;  LPD=PLED=lateralized periodic discharges; GPD=generalized periodic discharges; BiPDs=BiPLEDs=bilateral independent periodic epileptiform discharges; SIRPID=stimulus induced rhythmic, periodic, or ictal appearing discharges; BIRDs=brief potentially ictal rhythmic discharges >4 Hz, lasting .5-10s; PFA=paroxysmal bursts of beta/gamma; LVFA=low voltage fast activity.  Modifiers: +F=with fast component; +S=with spike component; +R=with rhythmic component.  S-B=burst suppression pattern.  Max=maximal. N1-drowsy; N2-stage II sleep; N3-slow wave sleep. SSS/BETS=small sharp spikes/benign epileptiform transients of sleep. HV=hyperventilation; PS=photic stimulation]]]    Daily EEG Visual Analysis    FINDINGS:      Background:  Continuity: continuous  Symmetry: symmetric  PDR: 10-10.5 Hz, reactive to eye closure  Voltage: normal  Anterior Posterior Gradient: present  Other background findings: none  Breach: absent    Background Slowing:  Generalized slowing: None  Focal slowing: Occasional bilateral frontotemporal focal polymorphic delta slowing     State Changes:   Drowsiness is characterized by slowing of the background activity.  Stage 2 sleep is characterized by symmetric K complexes.     Sporadic Epileptiform Discharges:    None    Rhythmic and Periodic Patterns (RPPs):  None     Electrographic and Electroclinical seizures:  None    Other Clinical Events:  None    Activation Procedures:   -Hyperventilation was not performed.    -Photic stimulation was performed and did not elicit any abnormalities.      Artifacts:  Intermittent myogenic and movement artifacts    Single-lead EKG: Regular rhythm around 110-120 bpm    EEG Classification / Summary:  Abnormal routine EEG in the awake / drowsy / asleep states  -Occasional bilateral frontotemporal focal slowing  -No epileptiform abnormalities or seizures captured.     Clinical Impression:   -Bilateral frontotemporal focal cerebral dysfunction can be structural or functional in etiology.  -No epileptiform abnormalities or seizures captured.   -Tachycardia on single-lead EKG      -------------------------------------------------------------------------------------------------------  [facility] EEG Reading Room Ph#: [phone]  Epilepsy Answering Service after 5PM and before 8:30AM: Ph#: [phone]    Russ Thompson DO   Epilepsy Fellow     Jessica Kaminski MD  Attending Physician, [facility] Epilepsy [place]

## 2025-05-07 NOTE — PROGRESS NOTE ADULT - ASSESSMENT
85F       PMHx of Pulmonary Fibrosis, HTN, HLD,          who comes in with episodes of slurred speech and aphasia.      TIA ,  per  neuro  d neptali gates, ,pt  is   non focal       CT head,  No acute intracranial hemorrhage. No noncontrast CT evidence of acute ischemia     CTA   h/n. No large vessel occlusion. No significant stenosis in the neck.      asa/  plavix. lipitor    mri  head,  no  acute  infarct     cxr.  chf,/  acute  diastolic         on iv  lasix      uti, /E  coli,         follow  ucx, sensitivity,   on iv  rocephin    HTN/ HLD        metoprolol succinate 50mg, HCTZ 12.5mg, Losartan 50mg .statin    h/o   Pulmonary fibrosis.          prednisone 5mg   qd     hypokalemia,  low phosphorus    dvt  ppx    total  encounter  time  35  minutes     rad< from: Xray Chest 1 View- PORTABLE-Urgent (05.05.25 @ 20:35) >  IMPRESSION: Mild to moderate CHF.  ---End of Report ---                  85F       PMHx of Pulmonary Fibrosis, HTN, HLD,  CAD /  2  STENTS          who comes in with episodes of slurred speech and aphasia.      TIA ,  per  neuro  dr gates, ,pt  is   non focal       CT head,  No acute intracranial hemorrhage. No noncontrast CT evidence of acute ischemia     CTA   h/n. No large vessel occlusion. No significant stenosis in the neck.      asa/  plavix. lipitor    mri  head,  no  acute  infarct     cxr.  chf,/  acute  diastolic         on iv  lasix      uti, /E  coli,         follow  ucx, sensitivity,   on iv  rocephin    HTN/ HLD    cad  ,  2   stenys,  5  yrs  ago, st  frnaces,  last  stress  test  a  yr  ago by  card   dr enriquez,  normal  pe r duaghter        metoprolol succinate 50mg, HCTZ 12.5mg, Losartan 50mg .statin    h/o   Pulmonary fibrosis.          prednisone 5mg   qd     hypokalemia,  low phosphorus    dvt  ppx/  family  at  bedside    total  encounter  time  35  minutes     rad< from: Xray Chest 1 View- PORTABLE-Urgent (05.05.25 @ 20:35) >  IMPRESSION: Mild to moderate CHF.  ---End of Report ---                  85F       PMHx of Pulmonary Fibrosis, HTN, HLD,  CAD /  2  STENTS          who comes in with episodes of slurred speech and aphasia.      TIA ,  per  neuro  dr gates, ,pt  is   non focal       CT head,  No acute intracranial hemorrhage. No noncontrast CT evidence of acute ischemia     CTA   h/n. No large vessel occlusion. No significant stenosis in the neck.      asa/  plavix. lipitor    mri  head,  no  acute  infarct     cxr.  chf,/  acute  diastolic         on iv  lasix      uti, /E  coli,         follow  ucx, sensitivity,   on iv  rocephin    HTN/ HLD    cad  ,  2   stenys,  5  yrs  ago, st  frnaces,  last  stress  test  a  yr  ago by  card   dr enriquez,  normal  pe r duaghter        metoprolol succinate 50mg,  Losartan 50mg .statin /  mild  hypercalcemia, hence  hctz  held   h/o   Pulmonary fibrosis.          prednisone 5mg   qd     hypokalemia,  low phosphorus    dvt  ppx/  family  at  bedside    total  encounter  time  35  minutes     rad< from: Xray Chest 1 View- PORTABLE-Urgent (05.05.25 @ 20:35) >  IMPRESSION: Mild to moderate CHF.  ---End of Report ---

## 2025-05-07 NOTE — PROGRESS NOTE ADULT - ASSESSMENT
85F, with PMHx of Pulmonary Fibrosis, HTN, HLD, GERD, who comes in with episodes of slurred speech and aphasia x 3 resolved. Neuro exam non-focal.   CTH, CTA, MRI no acute finding    Impression: possible TIA    MRI, CTA no acute finding  Aspirin 81  Statin titrate to LDL  A1c, LDL  Routine EEG as an outpt  On ceftriaxone  Can follow up with Neurology, Dr. Daljit Mitchell at [phone]

## 2025-05-07 NOTE — PHYSICAL THERAPY INITIAL EVALUATION ADULT - ADDITIONAL COMMENTS
From previous  PT evaluation: As per pt dtr face to face, pt resides in a house with  3 steps to enter with b/l rails, once inside there are no other steps, she stays on the main level, she uses RW from bed to bathroom which is 5 feet, after having  the T12 fracture, pt needs assist with ambulation, ADLs.

## 2025-05-08 ENCOUNTER — APPOINTMENT (OUTPATIENT)
Dept: PAIN MANAGEMENT | Facility: CLINIC | Age: 85
End: 2025-05-08

## 2025-05-08 ENCOUNTER — TRANSCRIPTION ENCOUNTER (OUTPATIENT)
Age: 85
End: 2025-05-08

## 2025-05-08 VITALS
DIASTOLIC BLOOD PRESSURE: 48 MMHG | OXYGEN SATURATION: 96 % | RESPIRATION RATE: 18 BRPM | HEART RATE: 94 BPM | TEMPERATURE: 98 F | SYSTOLIC BLOOD PRESSURE: 116 MMHG

## 2025-05-08 LAB
ANION GAP SERPL CALC-SCNC: 9 MMOL/L — SIGNIFICANT CHANGE UP (ref 5–17)
BUN SERPL-MCNC: 16 MG/DL — SIGNIFICANT CHANGE UP (ref 7–23)
CALCIUM SERPL-MCNC: 10.4 MG/DL — HIGH (ref 8.5–10.1)
CHLORIDE SERPL-SCNC: 103 MMOL/L — SIGNIFICANT CHANGE UP (ref 96–108)
CO2 SERPL-SCNC: 28 MMOL/L — SIGNIFICANT CHANGE UP (ref 22–31)
CREAT SERPL-MCNC: 0.92 MG/DL — SIGNIFICANT CHANGE UP (ref 0.5–1.3)
EGFR: 61 ML/MIN/1.73M2 — SIGNIFICANT CHANGE UP
EGFR: 61 ML/MIN/1.73M2 — SIGNIFICANT CHANGE UP
GLUCOSE SERPL-MCNC: 121 MG/DL — HIGH (ref 70–99)
HCT VFR BLD CALC: 36.1 % — SIGNIFICANT CHANGE UP (ref 34.5–45)
HGB BLD-MCNC: 11.3 G/DL — LOW (ref 11.5–15.5)
MCHC RBC-ENTMCNC: 28.3 PG — SIGNIFICANT CHANGE UP (ref 27–34)
MCHC RBC-ENTMCNC: 31.3 G/DL — LOW (ref 32–36)
MCV RBC AUTO: 90.3 FL — SIGNIFICANT CHANGE UP (ref 80–100)
NRBC BLD AUTO-RTO: 0 /100 WBCS — SIGNIFICANT CHANGE UP (ref 0–0)
PLATELET # BLD AUTO: 258 K/UL — SIGNIFICANT CHANGE UP (ref 150–400)
POTASSIUM SERPL-MCNC: 3.6 MMOL/L — SIGNIFICANT CHANGE UP (ref 3.5–5.3)
POTASSIUM SERPL-SCNC: 3.6 MMOL/L — SIGNIFICANT CHANGE UP (ref 3.5–5.3)
RBC # BLD: 4 M/UL — SIGNIFICANT CHANGE UP (ref 3.8–5.2)
RBC # FLD: 14.2 % — SIGNIFICANT CHANGE UP (ref 10.3–14.5)
SODIUM SERPL-SCNC: 140 MMOL/L — SIGNIFICANT CHANGE UP (ref 135–145)
WBC # BLD: 9.32 K/UL — SIGNIFICANT CHANGE UP (ref 3.8–10.5)
WBC # FLD AUTO: 9.32 K/UL — SIGNIFICANT CHANGE UP (ref 3.8–10.5)

## 2025-05-08 PROCEDURE — 99239 HOSP IP/OBS DSCHRG MGMT >30: CPT

## 2025-05-08 RX ORDER — HYDROCHLOROTHIAZIDE 50 MG/1
1 TABLET ORAL
Refills: 0 | DISCHARGE

## 2025-05-08 RX ORDER — MEMANTINE HYDROCHLORIDE 21 MG/1
1 CAPSULE, EXTENDED RELEASE ORAL
Qty: 0 | Refills: 0 | DISCHARGE
Start: 2025-05-08

## 2025-05-08 RX ORDER — NORTRIPTYLINE HCL 75 MG
1 CAPSULE ORAL
Qty: 0 | Refills: 0 | DISCHARGE
Start: 2025-05-08

## 2025-05-08 RX ORDER — FUROSEMIDE 10 MG/ML
20 INJECTION INTRAMUSCULAR; INTRAVENOUS DAILY
Refills: 0 | Status: DISCONTINUED | OUTPATIENT
Start: 2025-05-09 | End: 2025-05-08

## 2025-05-08 RX ORDER — FUROSEMIDE 10 MG/ML
1 INJECTION INTRAMUSCULAR; INTRAVENOUS
Qty: 30 | Refills: 0
Start: 2025-05-08 | End: 2025-06-06

## 2025-05-08 RX ORDER — ATORVASTATIN CALCIUM 80 MG/1
1 TABLET, FILM COATED ORAL
Qty: 0 | Refills: 0 | DISCHARGE
Start: 2025-05-08

## 2025-05-08 RX ORDER — ASPIRIN 325 MG
1 TABLET ORAL
Qty: 0 | Refills: 0 | DISCHARGE
Start: 2025-05-08

## 2025-05-08 RX ADMIN — FUROSEMIDE 20 MILLIGRAM(S): 10 INJECTION INTRAMUSCULAR; INTRAVENOUS at 05:06

## 2025-05-08 RX ADMIN — Medication 81 MILLIGRAM(S): at 11:00

## 2025-05-08 RX ADMIN — METOPROLOL SUCCINATE 25 MILLIGRAM(S): 50 TABLET, EXTENDED RELEASE ORAL at 05:05

## 2025-05-08 RX ADMIN — Medication 40 MILLIGRAM(S): at 05:05

## 2025-05-08 RX ADMIN — PREDNISONE 5 MILLIGRAM(S): 20 TABLET ORAL at 05:05

## 2025-05-08 RX ADMIN — HEPARIN SODIUM 5000 UNIT(S): 1000 INJECTION INTRAVENOUS; SUBCUTANEOUS at 05:06

## 2025-05-08 RX ADMIN — MEMANTINE HYDROCHLORIDE 5 MILLIGRAM(S): 21 CAPSULE, EXTENDED RELEASE ORAL at 05:05

## 2025-05-08 NOTE — DISCHARGE NOTE NURSING/CASE MANAGEMENT/SOCIAL WORK - FINANCIAL ASSISTANCE
[facility] provides services at a reduced cost to those who are determined to be eligible through [facility]’s financial assistance program. Information regarding [facility]’s financial assistance program can be found by going to [email or web address] or by calling [phone].

## 2025-05-08 NOTE — DISCHARGE NOTE PROVIDER - CARE PROVIDER_API CALL
Daljit Mitchell)  Neurology  [address] [facility] [address]  [place] [address]  Phone: [phone]  Fax: [phone]  Follow Up Time:     Dylan Pressley  Cardiology  [address] [place] [address]  Phone: [phone]  Fax: [phone]  Follow Up Time:

## 2025-05-08 NOTE — DISCHARGE NOTE NURSING/CASE MANAGEMENT/SOCIAL WORK - NSDCPEFALRISK_GEN_ALL_CORE
For information on Fall & Injury Prevention, visit: [email or web address] OR  [email or web address] OR  https://www.cdc.gov/steadi/patient.html

## 2025-05-08 NOTE — DISCHARGE NOTE PROVIDER - NSDCCPCAREPLAN_GEN_ALL_CORE_FT
PRINCIPAL DISCHARGE DIAGNOSIS  Diagnosis: Unsteady gait  Assessment and Plan of Treatment:       SECONDARY DISCHARGE DIAGNOSES  Diagnosis: Slurred speech  Assessment and Plan of Treatment:

## 2025-05-08 NOTE — PROGRESS NOTE ADULT - NUTRITIONAL ASSESSMENT
This patient has been assessed with a concern for Malnutrition and has been determined to have a diagnosis/diagnoses of Moderate protein-calorie malnutrition.    This patient is being managed with:   Diet DASH/TLC-  Sodium & Cholesterol Restricted  Easy to Chew (EASYTOCHEW)  Liquid Protein Supplement     Qty per Day:  1  Supplement Feeding Modality:  Oral  Ensure Plus High Protein Cans or Servings Per Day:  1       Frequency:  Daily  Entered: May  7 2025  3:52PM

## 2025-05-08 NOTE — PROGRESS NOTE ADULT - PROVIDER SPECIALTY LIST ADULT
Fax sent to [facility] requesting an update on this patient's BP over the past week.       
Internal Medicine
Neurology
Neurology
Internal Medicine
Hospitalist

## 2025-05-08 NOTE — DISCHARGE NOTE PROVIDER - HOSPITAL COURSE
85F       PMHx of Pulmonary Fibrosis, HTN, HLD,  CAD /  2  stents          who comes in with episodes of slurred speech and aphasia..  from TIA  , per  neuro,  did  not  have  a   stroke      TIA ,  per  neuro  dr gates, ,pt  is   non focal       CT head,  No acute intracranial hemorrhage. No noncontrast CT evidence of acute ischemia     CTA   h/n. No large vessel occlusion. No significant stenosis in the neck.      asa/  plavix. lipitor    mri  head,  no  acute  infarct     cxr.  chf,/  acute  diastolic         on  lasix /  pt  will  f/p  with he r own card  for  out pt  echo     uti, /E  coli,          was  on rocephin    HTN/ HLD    cad  ,  2   stenys,  5  yrs  ago, st  frnaces,  last  stress  test  a  yr  ago by  flavio enriquez,  normal  per  juarez        metoprolol succinate 50mg,  Losartan 50mg .statin /  mild  hypercalcemia, hence  hctz  held   h/o   Pulmonary fibrosis.          prednisone 5mg   qd     ok for   d/c  pe r neuro/  f/p  with  dr pink  neuro  and  card  d r duchattelier   85F       PMHx of Pulmonary Fibrosis, HTN, HLD,  CAD /  2  stents          who comes in with episodes of slurred speech and aphasia..  from TIA  , per  neuro,  did  not  have  a   stroke      TIA ,  per  neuro  dr gtaes, ,pt  is   non focal       CT head,  No acute intracranial hemorrhage. No noncontrast CT evidence of acute ischemia     CTA   h/n. No large vessel occlusion. No significant stenosis in the neck.      asa/  plavix. lipitor    mri  head,  no  acute  infarct     cxr.  chf,/  acute  diastolic         on  lasix /  pt  will  f/p  with he r own card  for  out pt  echo     uti, /E  coli,          was  on rocephin    HTN/ HLD    cad  ,  2   stenys,  5  yrs  ago, st  frnaces,  last  stress  test  a  yr  ago by  card   dr enriquez,  normal  per  juarez        metoprolol succinate 50mg,  Losartan 50mg .statin /  mild  hypercalcemia, hence  hctz  held   h/o   Pulmonary fibrosis.          prednisone 5mg   qd     daughter  at  bedside,  pt  has   wallker/  wheelc hair  already    ok for   d/c  pe r neuro/  f/p  with  dr pink  neuro  and  card  d r duchattelier

## 2025-05-08 NOTE — DISCHARGE NOTE NURSING/CASE MANAGEMENT/SOCIAL WORK - PATIENT PORTAL LINK FT
You can access the FollowMyHealth Patient Portal offered by [facility] by registering at the following website: [email or web address]. By joining [employer]’s FollowMyHealth portal, you will also be able to view your health information using other applications (apps) compatible with our system.

## 2025-05-08 NOTE — DISCHARGE NOTE PROVIDER - DETAILS OF MALNUTRITION DIAGNOSIS/DIAGNOSES
This patient has been assessed with a concern for Malnutrition and was treated during this hospitalization for the following Nutrition diagnosis/diagnoses:     -  05/07/2025: Moderate protein-calorie malnutrition

## 2025-05-08 NOTE — DISCHARGE NOTE PROVIDER - NSDCMRMEDTOKEN_GEN_ALL_CORE_FT
aspirin 81 mg oral delayed release tablet: 1 tab(s) orally once a day  atorvastatin 40 mg oral tablet: 1 tab(s) orally once a day (at bedtime)  hydroCHLOROthiazide 12.5 mg oral tablet: 1 tab(s) orally once a day  losartan 50 mg oral tablet: 1 tab(s) orally once a day  memantine 5 mg oral tablet: 1 tab(s) orally 2 times a day  metoprolol succinate 50 mg oral tablet, extended release: 1 tab(s) orally once a day  nortriptyline 25 mg oral capsule: 1 cap(s) orally once a day (at bedtime)  omeprazole 20 mg oral delayed release tablet: 1 tab(s) orally once a day  predniSONE 5 mg oral tablet: 1 tab(s) orally once a day   aspirin 81 mg oral delayed release tablet: 1 tab(s) orally once a day  atorvastatin 80 mg oral tablet: 1 tab(s) orally once a day (at bedtime)  furosemide 20 mg oral tablet: 1 tab(s) orally once a day  losartan 50 mg oral tablet: 1 tab(s) orally once a day  memantine 5 mg oral tablet: 1 tab(s) orally 2 times a day  metoprolol succinate 50 mg oral tablet, extended release: 1 tab(s) orally once a day  nortriptyline 25 mg oral capsule: 1 cap(s) orally once a day (at bedtime)  omeprazole 20 mg oral delayed release tablet: 1 tab(s) orally once a day  predniSONE 5 mg oral tablet: 1 tab(s) orally once a day

## 2025-05-08 NOTE — PROGRESS NOTE ADULT - SUBJECTIVE AND OBJECTIVE BOX
date of service: 05-07-25 @ 12:00  [facility]  REVIEW OF SYSTEMS:  CONSTITUTIONAL: No fever,  no  weight loss  ENT:  No  tinnitus,   no   vertigo  NECK: No pain or stiffness  RESPIRATORY: No cough, wheezing, chills or hemoptysis;    No Shortness of Breath  CARDIOVASCULAR: No chest pain, palpitations, dizziness  GASTROINTESTINAL: No abdominal or epigastric pain. No nausea, vomiting, or hematemesis; No diarrhea  No melena or hematochezia.  GENITOURINARY: No dysuria, frequency, hematuria, or incontinence  NEUROLOGICAL: No headaches  SKIN: No itching,  no   rash  LYMPH Nodes: No enlarged glands  ENDOCRINE: No heat or cold intolerance  MUSCULOSKELETAL: No joint pain or swelling  PSYCHIATRIC: No depression, anxiety  HEME/LYMPH: No easy bruising, or bleeding gums  ALLERGY AND IMMUNOLOGIC: No hives or eczema	    MEDICATIONS  (STANDING):  aspirin enteric coated 81 milliGRAM(s) Oral daily  atorvastatin 80 milliGRAM(s) Oral at bedtime  cefTRIAXone   IVPB      cefTRIAXone   IVPB 1000 milliGRAM(s) IV Intermittent every 24 hours  clopidogrel Tablet 75 milliGRAM(s) Oral daily  memantine 5 milliGRAM(s) Oral two times a day  nortriptyline 25 milliGRAM(s) Oral at bedtime  pantoprazole    Tablet 40 milliGRAM(s) Oral before breakfast  potassium chloride    Tablet ER 20 milliEquivalent(s) Oral once  potassium phosphate IVPB 15 milliMole(s) IV Intermittent once  predniSONE   Tablet 5 milliGRAM(s) Oral daily    MEDICATIONS  (PRN):  acetaminophen     Tablet .. 650 milliGRAM(s) Oral every 6 hours PRN Temp greater or equal to 38C (100.4F), Mild Pain (1 - 3)  aluminum hydroxide/magnesium hydroxide/simethicone Suspension 30 milliLiter(s) Oral every 4 hours PRN Dyspepsia  melatonin 3 milliGRAM(s) Oral at bedtime PRN Insomnia      Vital Signs Last 24 Hrs  T(C): 36.9 (07 May 2025 04:25), Max: 36.9 (06 May 2025 16:06)  T(F): 98.4 (07 May 2025 04:25), Max: 98.5 (06 May 2025 16:06)  HR: 108 (07 May 2025 04:25) (98 - 108)  BP: 135/65 (07 May 2025 04:25) (121/71 - 160/60)  BP(mean): --  RR: 16 (07 May 2025 04:25) (16 - 20)  SpO2: 95% (07 May 2025 04:25) (95% - 98%)    Parameters below as of 07 May 2025 04:25  Patient On (Oxygen Delivery Method): room air      CAPILLARY BLOOD GLUCOSE        I&O's Summary    06 May 2025 07:01  -  07 May 2025 07:00  --------------------------------------------------------  IN: 0 mL / OUT: 950 mL / NET: -950 mL          Appearance: Normal	  HEENT:   Normal oral mucosa, PERRL, EOMI	  Lymphatic: No lymphadenopathy  Cardiovascular: Normal S1 S2, No JVD  Respiratory: Lungs clear to auscultation	  Gastrointestinal:  Soft, Non-tender, + BS	  Skin: No rash, No ecchymoses	  Extremities:     LABS:                        11.3   9.51  )-----------( 230      ( 07 May 2025 06:05 )             34.9     05-07    138  |  104  |  14  ----------------------------<  127[H]  3.2[L]   |  28  |  0.94    Ca    10.5[H]      07 May 2025 06:05  Phos  2.4     05-07  Mg     1.3     05-07    TPro  7.8  /  Alb  2.9[L]  /  TBili  0.4  /  DBili  x   /  AST  20  /  ALT  16  /  AlkPhos  151[H]  05-06    PT/INR - ( 05 May 2025 19:38 )   PT: 12.9 sec;   INR: 1.11 ratio         PTT - ( 05 May 2025 19:38 )  PTT:29.9 sec      Urinalysis Basic - ( 07 May 2025 06:05 )    Color: x / Appearance: x / SG: x / pH: x  Gluc: 127 mg/dL / Ketone: x  / Bili: x / Urobili: x   Blood: x / Protein: x / Nitrite: x   Leuk Esterase: x / RBC: x / WBC x   Sq Epi: x / Non Sq Epi: x / Bacteria: x                      Consultant(s) Notes Reviewed:      Care Discussed with Consultants/Other Providers:    
  date of service: 05-08-25 @ 10:54  [facility]  REVIEW OF SYSTEMS:  CONSTITUTIONAL: No fever,  no  weight loss  ENT:  No  tinnitus,   no   vertigo  NECK: No pain or stiffness  RESPIRATORY: No cough, wheezing, chills or hemoptysis;    No Shortness of Breath  CARDIOVASCULAR: No chest pain, palpitations, dizziness  GASTROINTESTINAL: No abdominal or epigastric pain. No nausea, vomiting, or hematemesis; No diarrhea  No melena or hematochezia.  GENITOURINARY: No dysuria, frequency, hematuria, or incontinence  NEUROLOGICAL: No headaches  SKIN: No itching,  no   rash  LYMPH Nodes: No enlarged glands  ENDOCRINE: No heat or cold intolerance  MUSCULOSKELETAL: No joint pain or swelling  PSYCHIATRIC: No depression, anxiety  HEME/LYMPH: No easy bruising, or bleeding gums  ALLERGY AND IMMUNOLOGIC: No hives or eczema	    MEDICATIONS  (STANDING):  aspirin enteric coated 81 milliGRAM(s) Oral daily  atorvastatin 80 milliGRAM(s) Oral at bedtime  heparin   Injectable 5000 Unit(s) SubCutaneous every 12 hours  memantine 5 milliGRAM(s) Oral two times a day  metoprolol succinate ER 25 milliGRAM(s) Oral daily  nortriptyline 25 milliGRAM(s) Oral at bedtime  pantoprazole    Tablet 40 milliGRAM(s) Oral before breakfast  predniSONE   Tablet 5 milliGRAM(s) Oral daily    MEDICATIONS  (PRN):  acetaminophen     Tablet .. 650 milliGRAM(s) Oral every 6 hours PRN Temp greater or equal to 38C (100.4F), Mild Pain (1 - 3)  melatonin 3 milliGRAM(s) Oral at bedtime PRN Insomnia      Vital Signs Last 24 Hrs  T(C): 36.4 (08 May 2025 08:00), Max: 37.6 (08 May 2025 04:20)  T(F): 97.6 (08 May 2025 08:00), Max: 99.7 (08 May 2025 04:20)  HR: 90 (08 May 2025 08:00) (75 - 111)  BP: 127/62 (08 May 2025 08:00) (110/63 - 155/80)  BP(mean): --  RR: 18 (08 May 2025 08:00) (16 - 18)  SpO2: 97% (08 May 2025 08:00) (96% - 98%)    Parameters below as of 08 May 2025 08:00  Patient On (Oxygen Delivery Method): room air      CAPILLARY BLOOD GLUCOSE        I&O's Summary    07 May 2025 07:01  -  08 May 2025 07:00  --------------------------------------------------------  IN: 500 mL / OUT: 2650 mL / NET: -2150 mL          Appearance: Normal	  HEENT:   Normal oral mucosa, PERRL, EOMI	  Lymphatic: No lymphadenopathy  Cardiovascular: Normal S1 S2, No JVD  Respiratory: Lungs clear to auscultation	  Gastrointestinal:  Soft, Non-tender, + BS	  Skin: No rash, No ecchymoses	  Extremities:     LABS:                        11.3   9.32  )-----------( 258      ( 08 May 2025 06:03 )             36.1     05-08    140  |  103  |  16  ----------------------------<  121[H]  3.6   |  28  |  0.92    Ca    10.4[H]      08 May 2025 06:03  Phos  2.4     05-07  Mg     1.3     05-07            Urinalysis Basic - ( 08 May 2025 06:03 )    Color: x / Appearance: x / SG: x / pH: x  Gluc: 121 mg/dL / Ketone: x  / Bili: x / Urobili: x   Blood: x / Protein: x / Nitrite: x   Leuk Esterase: x / RBC: x / WBC x   Sq Epi: x / Non Sq Epi: x / Bacteria: x                      Consultant(s) Notes Reviewed:      Care Discussed with Consultants/Other Providers:    
Patient seen and examined this am. No new events    MEDICATIONS:    acetaminophen     Tablet .. 650 milliGRAM(s) Oral every 6 hours PRN  aluminum hydroxide/magnesium hydroxide/simethicone Suspension 30 milliLiter(s) Oral every 4 hours PRN  aspirin enteric coated 81 milliGRAM(s) Oral daily  atorvastatin 80 milliGRAM(s) Oral at bedtime  cefTRIAXone   IVPB      cefTRIAXone   IVPB 1000 milliGRAM(s) IV Intermittent every 24 hours  clopidogrel Tablet 75 milliGRAM(s) Oral daily  melatonin 3 milliGRAM(s) Oral at bedtime PRN  memantine 5 milliGRAM(s) Oral two times a day  nortriptyline 25 milliGRAM(s) Oral at bedtime  pantoprazole    Tablet 40 milliGRAM(s) Oral before breakfast  potassium chloride    Tablet ER 20 milliEquivalent(s) Oral once  potassium phosphate IVPB 15 milliMole(s) IV Intermittent once  predniSONE   Tablet 5 milliGRAM(s) Oral daily      LABS:                          11.3   9.51  )-----------( 230      ( 07 May 2025 06:05 )             34.9     05-07    138  |  104  |  14  ----------------------------<  127[H]  3.2[L]   |  28  |  0.94    Ca    10.5[H]      07 May 2025 06:05  Phos  2.4     05-07  Mg     1.3     05-07    TPro  7.8  /  Alb  2.9[L]  /  TBili  0.4  /  DBili  x   /  AST  20  /  ALT  16  /  AlkPhos  151[H]  05-06    CAPILLARY BLOOD GLUCOSE        PT/INR - ( 05 May 2025 19:38 )   PT: 12.9 sec;   INR: 1.11 ratio         PTT - ( 05 May 2025 19:38 )  PTT:29.9 sec  Urinalysis Basic - ( 07 May 2025 06:05 )    Color: x / Appearance: x / SG: x / pH: x  Gluc: 127 mg/dL / Ketone: x  / Bili: x / Urobili: x   Blood: x / Protein: x / Nitrite: x   Leuk Esterase: x / RBC: x / WBC x   Sq Epi: x / Non Sq Epi: x / Bacteria: x      I&O's Summary    06 May 2025 07:01  -  07 May 2025 07:00  --------------------------------------------------------  IN: 0 mL / OUT: 950 mL / NET: -950 mL      Vital Signs Last 24 Hrs  T(C): 36.9 (07 May 2025 04:25), Max: 36.9 (06 May 2025 16:06)  T(F): 98.4 (07 May 2025 04:25), Max: 98.5 (06 May 2025 16:06)  HR: 108 (07 May 2025 04:25) (98 - 108)  BP: 135/65 (07 May 2025 04:25) (121/71 - 160/60)  BP(mean): --  RR: 16 (07 May 2025 04:25) (16 - 20)  SpO2: 95% (07 May 2025 04:25) (95% - 98%)    Parameters below as of 07 May 2025 04:25  Patient On (Oxygen Delivery Method): room air          On Neurological Examination:    Neuro: AAOx3; knows age, month, obeys commands, no dysarthria; calculations intact, fluent names, repeats     CN: PERRL, EOMI, VFF normal gaze preference, no facial palsy,     Motor: no drift in all extremeties    Sensory: Intact to LT and PP no extinction    Coordination: FTN intact b/l                                             GENERAL Exam:     Nontoxic , No Acute Distress   	  HEENT:  normocephalic, atraumatic  		  LUNGS:	Clear bilaterally  No Wheeze    	  HEART:	 Normal S1S2   No murmur RRR        	  GI/ ABDOMEN:  Soft  Non tender    EXTREMITIES:   No Edema  No Clubbing  No Cyanosis No Edema    MUSCULOSKELETAL: Normal Range of Motion  	   SKIN:      Normal   No Ecchymosis      RADIOLOGY  CTH < from: MR Head No Cont (05.06.25 @ 09:44) >  IMPRESSION: No acute hemorrhage, mass or mass effect.    < end of copied text >  < from: CT Angio Neck Stroke Protocol w/ IV Cont (05.05.25 @ 19:31) >  IMPRESSION:    No acute intracranial hemorrhage. No noncontrast CT evidence of acute   ischemia.    No large vessel occlusion. No significant stenosis in the neck.    CT perfusion imaging demonstrates 3 mL of tissue at risk in the left   cerebellum, likely artifact.    MRI is offered for further evaluation.    < end of copied text >                    
Patient seen and examined this am. No new events    MEDICATIONS:    acetaminophen     Tablet .. 650 milliGRAM(s) Oral every 6 hours PRN  aspirin enteric coated 81 milliGRAM(s) Oral daily  atorvastatin 80 milliGRAM(s) Oral at bedtime  cefTRIAXone   IVPB      cefTRIAXone   IVPB 1000 milliGRAM(s) IV Intermittent every 24 hours  furosemide   Injectable 20 milliGRAM(s) IV Push daily  heparin   Injectable 5000 Unit(s) SubCutaneous every 12 hours  melatonin 3 milliGRAM(s) Oral at bedtime PRN  memantine 5 milliGRAM(s) Oral two times a day  metoprolol succinate ER 25 milliGRAM(s) Oral daily  nortriptyline 25 milliGRAM(s) Oral at bedtime  pantoprazole    Tablet 40 milliGRAM(s) Oral before breakfast  predniSONE   Tablet 5 milliGRAM(s) Oral daily      LABS:                          11.3   9.32  )-----------( 258      ( 08 May 2025 06:03 )             36.1     05-08    140  |  103  |  16  ----------------------------<  121[H]  3.6   |  28  |  0.92    Ca    10.4[H]      08 May 2025 06:03  Phos  2.4     05-07  Mg     1.3     05-07      CAPILLARY BLOOD GLUCOSE          Urinalysis Basic - ( 08 May 2025 06:03 )    Color: x / Appearance: x / SG: x / pH: x  Gluc: 121 mg/dL / Ketone: x  / Bili: x / Urobili: x   Blood: x / Protein: x / Nitrite: x   Leuk Esterase: x / RBC: x / WBC x   Sq Epi: x / Non Sq Epi: x / Bacteria: x      I&O's Summary    07 May 2025 07:01  -  08 May 2025 07:00  --------------------------------------------------------  IN: 500 mL / OUT: 2650 mL / NET: -2150 mL      Vital Signs Last 24 Hrs  T(C): 36.4 (08 May 2025 08:00), Max: 37.6 (08 May 2025 04:20)  T(F): 97.6 (08 May 2025 08:00), Max: 99.7 (08 May 2025 04:20)  HR: 90 (08 May 2025 08:00) (75 - 111)  BP: 127/62 (08 May 2025 08:00) (110/63 - 155/80)  BP(mean): --  RR: 18 (08 May 2025 08:00) (16 - 18)  SpO2: 97% (08 May 2025 08:00) (96% - 98%)    Parameters below as of 08 May 2025 08:00  Patient On (Oxygen Delivery Method): room air          On Neurological Examination:    Neuro: AAOx3; knows age, month, obeys commands, no dysarthria; calculations intact, fluent names, repeats     CN: PERRL, EOMI, VFF normal gaze preference, no facial palsy,     Motor: no drift in all extremeties    Sensory: Intact to LT and PP no extinction    Coordination: FTN intact b/l                                             GENERAL Exam:     Nontoxic , No Acute Distress   	  HEENT:  normocephalic, atraumatic  		  LUNGS:	Clear bilaterally  No Wheeze    	  HEART:	 Normal S1S2   No murmur RRR        	  GI/ ABDOMEN:  Soft  Non tender    EXTREMITIES:   No Edema  No Clubbing  No Cyanosis No Edema    MUSCULOSKELETAL: Normal Range of Motion  	   SKIN:      Normal   No Ecchymosis      RADIOLOGY  CTH < from: MR Head No Cont (05.06.25 @ 09:44) >  IMPRESSION: No acute hemorrhage, mass or mass effect.    < end of copied text >  < from: CT Angio Neck Stroke Protocol w/ IV Cont (05.05.25 @ 19:31) >  IMPRESSION:    No acute intracranial hemorrhage. No noncontrast CT evidence of acute   ischemia.    No large vessel occlusion. No significant stenosis in the neck.    CT perfusion imaging demonstrates 3 mL of tissue at risk in the left   cerebellum, likely artifact.    MRI is offered for further evaluation.    < end of copied text >        Clinical Impression:   -Bilateral frontotemporal focal cerebral dysfunction can be structural or functional in etiology.  -No epileptiform abnormalities or seizures captured.   -Tachycardia on single-lead EKG            
Patient is a 85y old  Female who presents with a chief complaint of TIA (05 May 2025 23:44)      OVERNIGHT EVENTS:      MEDICATIONS  (STANDING):  aspirin enteric coated 81 milliGRAM(s) Oral daily  atorvastatin 80 milliGRAM(s) Oral at bedtime  cefTRIAXone   IVPB      cefTRIAXone   IVPB 1000 milliGRAM(s) IV Intermittent every 24 hours  clopidogrel Tablet 75 milliGRAM(s) Oral daily  memantine 5 milliGRAM(s) Oral two times a day  nortriptyline 25 milliGRAM(s) Oral at bedtime  pantoprazole    Tablet 40 milliGRAM(s) Oral before breakfast  predniSONE   Tablet 5 milliGRAM(s) Oral daily    MEDICATIONS  (PRN):  acetaminophen     Tablet .. 650 milliGRAM(s) Oral every 6 hours PRN Temp greater or equal to 38C (100.4F), Mild Pain (1 - 3)  aluminum hydroxide/magnesium hydroxide/simethicone Suspension 30 milliLiter(s) Oral every 4 hours PRN Dyspepsia  melatonin 3 milliGRAM(s) Oral at bedtime PRN Insomnia      Allergies    No Known Allergies    Intolerances        SUBJECTIVE: in bed in NAD, no acute events overnight     T(F): 98 (05-06-25 @ 09:31), Max: 98.7 (05-06-25 @ 06:11)  HR: 93 (05-06-25 @ 09:31) (83 - 93)  BP: 126/52 (05-06-25 @ 09:31) (105/67 - 148/57)  RR: 20 (05-06-25 @ 09:31) (17 - 24)  SpO2: 94% (05-06-25 @ 09:31) (94% - 100%)  Wt(kg): --    PHYSICAL EXAM:  GENERAL: NAD, well-groomed, well-developed  HEAD:  Atraumatic, Normocephalic  EYES: EOMI, PERRLA, conjunctiva and sclera clear  ENMT: No tonsillar erythema, exudates, or enlargement; Moist mucous membranes, Good dentition, No lesions  NECK: Supple,   CHEST/LUNG: Clear to  auscultation bilaterally; No rales, rhonchi, wheezing, or rubs  bilaterally  HEART: Regular rate and rhythm; No murmurs, rubs, or gallops  ABDOMEN: Soft, Nontender, Nondistended; Bowel sounds present  EXTREMITIES:  2+ Peripheral Pulses, No clubbing, cyanosis, or edema BL LE  SKIN: No rashes or lesions  NERVOUS SYSTEM:  Alert & Oriented X3, Good concentration; Motor Strength 5/5 B/L upper and lower extremities;   DTRs 2+ intact and symmetric, sensation intact BL    LABS:                        11.1   9.23  )-----------( 256      ( 06 May 2025 05:30 )             35.5     05-06    138  |  103  |  18  ----------------------------<  95  3.9   |  27  |  1.19    Ca    10.9[H]      06 May 2025 05:30    TPro  7.8  /  Alb  2.9[L]  /  TBili  0.4  /  DBili  x   /  AST  20  /  ALT  16  /  AlkPhos  151[H]  05-06    PT/INR - ( 05 May 2025 19:38 )   PT: 12.9 sec;   INR: 1.11 ratio         PTT - ( 05 May 2025 19:38 )  PTT:29.9 sec  Urinalysis Basic - ( 06 May 2025 05:30 )    Color: x / Appearance: x / SG: x / pH: x  Gluc: 95 mg/dL / Ketone: x  / Bili: x / Urobili: x   Blood: x / Protein: x / Nitrite: x   Leuk Esterase: x / RBC: x / WBC x   Sq Epi: x / Non Sq Epi: x / Bacteria: x      Cultures;   CAPILLARY BLOOD GLUCOSE      POCT Blood Glucose.: 87 mg/dL (05 May 2025 18:56)    Lipid panel:           RADIOLOGY & ADDITIONAL TESTS:  < from: MR Head No Cont (05.06.25 @ 09:44) >    IMPRESSION: No acute hemorrhage, mass or mass effect.      < end of copied text >      Imaging Personally Reviewed:  [ x] YES      Consultant(s) Notes Reviewed:  [x ] YES     Care Discussed with [x ] Consultants [X ] Patient [x ] Family  [x ]    [x ]  Other; RN

## 2025-05-08 NOTE — PROGRESS NOTE ADULT - ASSESSMENT
85F       PMHx of Pulmonary Fibrosis, HTN, HLD,  CAD /  2  stents          who comes in with episodes of slurred speech and aphasia..  from TIA  , per  neuro,  did  not  have  a   stroke      TIA ,  per  neuro  dr gates, ,pt  is   non focal       CT head,  No acute intracranial hemorrhage. No noncontrast CT evidence of acute ischemia     CTA   h/n. No large vessel occlusion. No significant stenosis in the neck.      asa/  plavix. lipitor    mri  head,  no  acute  infarct     cxr.  chf,/  acute  diastolic         on  lasix /  pt  will  f/p  with he r own card  for  out pt  echo     uti, /E  coli,          was  on rocephin    HTN/ HLD    cad  ,  2   stenys,  5  yrs  ago, st  [facility],  last  stress  test  a  yr  ago by  card   dr enriquez,  normal  per  juarez        metoprolol succinate 50mg,  Losartan 50mg .statin /  mild  hypercalcemia, hence  hctz  held   h/o   Pulmonary fibrosis.          prednisone 5mg   qd     ok for   d/c  pe r neuro   dvt  ppx/  family  at  bedside    total  encounter  time   >  30   minutes     rad< from: Xray Chest 1 View- PORTABLE-Urgent (05.05.25 @ 20:35) >  IMPRESSION: Mild to moderate CHF.  ---End of Report ---

## 2025-05-08 NOTE — PROGRESS NOTE ADULT - ASSESSMENT
85F, with PMHx of Pulmonary Fibrosis, HTN, HLD, GERD, who comes in with episodes of slurred speech and aphasia x 3 resolved. Neuro exam non-focal.   CTH, CTA, MRI no acute finding    Impression: possible TIA    MRI, CTA no acute finding  Aspirin 81  Statin titrate to LDL  A1c, LDL  Routine EEG bilaterally frontotemporal slowing  On ceftriaxone  Can follow up with Neurology, Dr. Daljit Mitchell at [phone]

## 2025-05-12 ENCOUNTER — APPOINTMENT (OUTPATIENT)
Dept: UROGYNECOLOGY | Facility: CLINIC | Age: 85
End: 2025-05-12
Payer: MEDICARE

## 2025-05-12 VITALS
HEART RATE: 79 BPM | SYSTOLIC BLOOD PRESSURE: 152 MMHG | WEIGHT: 178 LBS | DIASTOLIC BLOOD PRESSURE: 78 MMHG | BODY MASS INDEX: 40.04 KG/M2 | HEIGHT: 56 IN

## 2025-05-12 DIAGNOSIS — N39.41 URGE INCONTINENCE: ICD-10-CM

## 2025-05-12 DIAGNOSIS — N95.2 POSTMENOPAUSAL ATROPHIC VAGINITIS: ICD-10-CM

## 2025-05-12 DIAGNOSIS — N39.0 URINARY TRACT INFECTION, SITE NOT SPECIFIED: ICD-10-CM

## 2025-05-12 LAB
BILIRUB UR QL STRIP: NORMAL
CLARITY UR: CLEAR
COLLECTION METHOD: NORMAL
GLUCOSE UR-MCNC: NORMAL
HCG UR QL: 0.2 EU/DL
HGB UR QL STRIP.AUTO: NORMAL
KETONES UR-MCNC: NORMAL
LEUKOCYTE ESTERASE UR QL STRIP: NORMAL
NITRITE UR QL STRIP: NORMAL
PH UR STRIP: 7
PROT UR STRIP-MCNC: NORMAL
SP GR UR STRIP: 1.01

## 2025-05-12 PROCEDURE — 51701 INSERT BLADDER CATHETER: CPT

## 2025-05-12 PROCEDURE — 99459 PELVIC EXAMINATION: CPT

## 2025-05-12 PROCEDURE — 81003 URINALYSIS AUTO W/O SCOPE: CPT | Mod: QW

## 2025-05-12 PROCEDURE — 99204 OFFICE O/P NEW MOD 45 MIN: CPT | Mod: 25

## 2025-05-12 RX ORDER — ESTRADIOL 0.1 MG/G
0.1 CREAM VAGINAL
Qty: 1 | Refills: 1 | Status: ACTIVE | COMMUNITY
Start: 2025-05-12 | End: 1900-01-01

## 2025-05-20 DIAGNOSIS — Z95.5 PRESENCE OF CORONARY ANGIOPLASTY IMPLANT AND GRAFT: ICD-10-CM

## 2025-05-20 DIAGNOSIS — R47.01 APHASIA: ICD-10-CM

## 2025-05-20 DIAGNOSIS — R47.81 SLURRED SPEECH: ICD-10-CM

## 2025-05-20 DIAGNOSIS — N39.0 URINARY TRACT INFECTION, SITE NOT SPECIFIED: ICD-10-CM

## 2025-05-20 DIAGNOSIS — Z79.52 LONG TERM (CURRENT) USE OF SYSTEMIC STEROIDS: ICD-10-CM

## 2025-05-20 DIAGNOSIS — K21.9 GASTRO-ESOPHAGEAL REFLUX DISEASE WITHOUT ESOPHAGITIS: ICD-10-CM

## 2025-05-20 DIAGNOSIS — I50.31 ACUTE DIASTOLIC (CONGESTIVE) HEART FAILURE: ICD-10-CM

## 2025-05-20 DIAGNOSIS — E83.39 OTHER DISORDERS OF PHOSPHORUS METABOLISM: ICD-10-CM

## 2025-05-20 DIAGNOSIS — E83.52 HYPERCALCEMIA: ICD-10-CM

## 2025-05-20 DIAGNOSIS — E44.0 MODERATE PROTEIN-CALORIE MALNUTRITION: ICD-10-CM

## 2025-05-20 DIAGNOSIS — Z79.82 LONG TERM (CURRENT) USE OF ASPIRIN: ICD-10-CM

## 2025-05-20 DIAGNOSIS — B96.20 UNSPECIFIED ESCHERICHIA COLI [E. COLI] AS THE CAUSE OF DISEASES CLASSIFIED ELSEWHERE: ICD-10-CM

## 2025-05-20 DIAGNOSIS — I27.20 PULMONARY HYPERTENSION, UNSPECIFIED: ICD-10-CM

## 2025-05-20 DIAGNOSIS — I25.10 ATHEROSCLEROTIC HEART DISEASE OF NATIVE CORONARY ARTERY WITHOUT ANGINA PECTORIS: ICD-10-CM

## 2025-05-20 DIAGNOSIS — E87.6 HYPOKALEMIA: ICD-10-CM

## 2025-05-20 DIAGNOSIS — Z79.899 OTHER LONG TERM (CURRENT) DRUG THERAPY: ICD-10-CM

## 2025-05-20 DIAGNOSIS — J84.10 PULMONARY FIBROSIS, UNSPECIFIED: ICD-10-CM

## 2025-05-20 DIAGNOSIS — M19.90 UNSPECIFIED OSTEOARTHRITIS, UNSPECIFIED SITE: ICD-10-CM

## 2025-05-20 DIAGNOSIS — E78.5 HYPERLIPIDEMIA, UNSPECIFIED: ICD-10-CM

## 2025-05-20 DIAGNOSIS — I11.0 HYPERTENSIVE HEART DISEASE WITH HEART FAILURE: ICD-10-CM

## 2025-05-21 ENCOUNTER — APPOINTMENT (OUTPATIENT)
Dept: PAIN MANAGEMENT | Facility: CLINIC | Age: 85
End: 2025-05-21

## 2025-05-23 DIAGNOSIS — J98.59 OTHER DISEASES OF MEDIASTINUM, NOT ELSEWHERE CLASSIFIED: ICD-10-CM

## 2025-05-23 DIAGNOSIS — Z87.828 PERSONAL HISTORY OF OTHER (HEALED) PHYSICAL INJURY AND TRAUMA: ICD-10-CM

## 2025-05-23 DIAGNOSIS — Z83.3 FAMILY HISTORY OF DIABETES MELLITUS: ICD-10-CM

## 2025-05-23 DIAGNOSIS — Z82.49 FAMILY HISTORY OF ISCHEMIC HEART DISEASE AND OTHER DISEASES OF THE CIRCULATORY SYSTEM: ICD-10-CM

## 2025-05-23 DIAGNOSIS — M53.9 DORSOPATHY, UNSPECIFIED: ICD-10-CM

## 2025-05-23 DIAGNOSIS — Z83.438 FAMILY HISTORY OF OTHER DISORDER OF LIPOPROTEIN METABOLISM AND OTHER LIPIDEMIA: ICD-10-CM

## 2025-05-29 ENCOUNTER — APPOINTMENT (OUTPATIENT)
Dept: PAIN MANAGEMENT | Facility: CLINIC | Age: 85
End: 2025-05-29
Payer: MEDICARE

## 2025-05-29 DIAGNOSIS — M54.16 RADICULOPATHY, LUMBAR REGION: ICD-10-CM

## 2025-05-29 PROCEDURE — 62323 NJX INTERLAMINAR LMBR/SAC: CPT

## 2025-06-18 ENCOUNTER — APPOINTMENT (OUTPATIENT)
Dept: PAIN MANAGEMENT | Facility: CLINIC | Age: 85
End: 2025-06-18
Payer: MEDICARE

## 2025-06-18 VITALS — BODY MASS INDEX: 36.44 KG/M2 | WEIGHT: 162 LBS | HEIGHT: 56 IN

## 2025-06-18 PROBLEM — M47.816 LUMBAR SPONDYLOSIS: Status: ACTIVE | Noted: 2025-06-18

## 2025-06-18 PROCEDURE — 99214 OFFICE O/P EST MOD 30 MIN: CPT

## 2025-06-18 RX ORDER — LIDOCAINE 5% 700 MG/1
5 PATCH TOPICAL
Qty: 30 | Refills: 1 | Status: ACTIVE | COMMUNITY
Start: 2025-06-18 | End: 1900-01-01

## 2025-07-14 ENCOUNTER — APPOINTMENT (OUTPATIENT)
Dept: UROGYNECOLOGY | Facility: CLINIC | Age: 85
End: 2025-07-14
Payer: MEDICARE

## 2025-07-14 VITALS
HEART RATE: 102 BPM | HEIGHT: 56 IN | BODY MASS INDEX: 36.44 KG/M2 | WEIGHT: 162 LBS | DIASTOLIC BLOOD PRESSURE: 78 MMHG | SYSTOLIC BLOOD PRESSURE: 145 MMHG

## 2025-07-14 LAB
BILIRUB UR QL STRIP: NORMAL
CLARITY UR: CLEAR
COLLECTION METHOD: NORMAL
GLUCOSE UR-MCNC: NORMAL
HCG UR QL: 0.2 EU/DL
HGB UR QL STRIP.AUTO: NORMAL
KETONES UR-MCNC: NORMAL
LEUKOCYTE ESTERASE UR QL STRIP: NORMAL
NITRITE UR QL STRIP: NORMAL
PH UR STRIP: 6.5
PROT UR STRIP-MCNC: NORMAL
SP GR UR STRIP: 1.02

## 2025-07-14 PROCEDURE — 99214 OFFICE O/P EST MOD 30 MIN: CPT | Mod: 25

## 2025-07-14 PROCEDURE — 99459 PELVIC EXAMINATION: CPT

## 2025-07-14 PROCEDURE — 81003 URINALYSIS AUTO W/O SCOPE: CPT | Mod: QW

## 2025-07-14 PROCEDURE — 51701 INSERT BLADDER CATHETER: CPT

## 2025-07-25 ENCOUNTER — APPOINTMENT (OUTPATIENT)
Age: 85
End: 2025-07-25

## 2025-08-02 ENCOUNTER — EMERGENCY (EMERGENCY)
Facility: HOSPITAL | Age: 85
LOS: 1 days | End: 2025-08-02
Attending: INTERNAL MEDICINE | Admitting: INTERNAL MEDICINE
Payer: COMMERCIAL

## 2025-08-02 VITALS
SYSTOLIC BLOOD PRESSURE: 103 MMHG | OXYGEN SATURATION: 95 % | HEIGHT: 56 IN | WEIGHT: 164.91 LBS | HEART RATE: 98 BPM | DIASTOLIC BLOOD PRESSURE: 56 MMHG | TEMPERATURE: 98 F | RESPIRATION RATE: 19 BRPM

## 2025-08-02 VITALS
SYSTOLIC BLOOD PRESSURE: 110 MMHG | HEART RATE: 94 BPM | RESPIRATION RATE: 18 BRPM | DIASTOLIC BLOOD PRESSURE: 62 MMHG | OXYGEN SATURATION: 98 % | TEMPERATURE: 98 F

## 2025-08-02 LAB
ALBUMIN SERPL ELPH-MCNC: 3 G/DL — LOW (ref 3.3–5)
ALP SERPL-CCNC: 154 U/L — HIGH (ref 40–120)
ALT FLD-CCNC: 20 U/L — SIGNIFICANT CHANGE UP (ref 12–78)
ANION GAP SERPL CALC-SCNC: 9 MMOL/L — SIGNIFICANT CHANGE UP (ref 5–17)
AST SERPL-CCNC: 23 U/L — SIGNIFICANT CHANGE UP (ref 15–37)
BASOPHILS # BLD AUTO: 0.03 K/UL — SIGNIFICANT CHANGE UP (ref 0–0.2)
BASOPHILS NFR BLD AUTO: 0.3 % — SIGNIFICANT CHANGE UP (ref 0–2)
BILIRUB SERPL-MCNC: 0.3 MG/DL — SIGNIFICANT CHANGE UP (ref 0.2–1.2)
BUN SERPL-MCNC: 20 MG/DL — SIGNIFICANT CHANGE UP (ref 7–23)
CALCIUM SERPL-MCNC: 10.2 MG/DL — HIGH (ref 8.5–10.1)
CHLORIDE SERPL-SCNC: 102 MMOL/L — SIGNIFICANT CHANGE UP (ref 96–108)
CO2 SERPL-SCNC: 26 MMOL/L — SIGNIFICANT CHANGE UP (ref 22–31)
CREAT SERPL-MCNC: 1.3 MG/DL — SIGNIFICANT CHANGE UP (ref 0.5–1.3)
EGFR: 40 ML/MIN/1.73M2 — LOW
EGFR: 40 ML/MIN/1.73M2 — LOW
EOSINOPHIL # BLD AUTO: 0.12 K/UL — SIGNIFICANT CHANGE UP (ref 0–0.5)
EOSINOPHIL NFR BLD AUTO: 1.1 % — SIGNIFICANT CHANGE UP (ref 0–6)
GLUCOSE SERPL-MCNC: 174 MG/DL — HIGH (ref 70–99)
HCT VFR BLD CALC: 39.4 % — SIGNIFICANT CHANGE UP (ref 34.5–45)
HGB BLD-MCNC: 12.6 G/DL — SIGNIFICANT CHANGE UP (ref 11.5–15.5)
IMM GRANULOCYTES # BLD AUTO: 0.06 K/UL — SIGNIFICANT CHANGE UP (ref 0–0.07)
IMM GRANULOCYTES NFR BLD AUTO: 0.6 % — SIGNIFICANT CHANGE UP (ref 0–0.9)
LYMPHOCYTES # BLD AUTO: 0.69 K/UL — LOW (ref 1–3.3)
LYMPHOCYTES NFR BLD AUTO: 6.4 % — LOW (ref 13–44)
MCHC RBC-ENTMCNC: 27.9 PG — SIGNIFICANT CHANGE UP (ref 27–34)
MCHC RBC-ENTMCNC: 32 G/DL — SIGNIFICANT CHANGE UP (ref 32–36)
MCV RBC AUTO: 87.2 FL — SIGNIFICANT CHANGE UP (ref 80–100)
MONOCYTES # BLD AUTO: 0.66 K/UL — SIGNIFICANT CHANGE UP (ref 0–0.9)
MONOCYTES NFR BLD AUTO: 6.2 % — SIGNIFICANT CHANGE UP (ref 2–14)
NEUTROPHILS # BLD AUTO: 9.17 K/UL — HIGH (ref 1.8–7.4)
NEUTROPHILS NFR BLD AUTO: 85.4 % — HIGH (ref 43–77)
NRBC # BLD AUTO: 0 K/UL — SIGNIFICANT CHANGE UP (ref 0–0)
NRBC # FLD: 0 K/UL — SIGNIFICANT CHANGE UP (ref 0–0)
NRBC BLD AUTO-RTO: 0 /100 WBCS — SIGNIFICANT CHANGE UP (ref 0–0)
PLATELET # BLD AUTO: 232 K/UL — SIGNIFICANT CHANGE UP (ref 150–400)
PMV BLD: 10.3 FL — SIGNIFICANT CHANGE UP (ref 7–13)
POTASSIUM SERPL-MCNC: 3.2 MMOL/L — LOW (ref 3.5–5.3)
POTASSIUM SERPL-SCNC: 3.2 MMOL/L — LOW (ref 3.5–5.3)
PROT SERPL-MCNC: 7.4 G/DL — SIGNIFICANT CHANGE UP (ref 6–8.3)
RBC # BLD: 4.52 M/UL — SIGNIFICANT CHANGE UP (ref 3.8–5.2)
RBC # FLD: 14 % — SIGNIFICANT CHANGE UP (ref 10.3–14.5)
SODIUM SERPL-SCNC: 137 MMOL/L — SIGNIFICANT CHANGE UP (ref 135–145)
TROPONIN I, HIGH SENSITIVITY RESULT: 9.4 NG/L — SIGNIFICANT CHANGE UP
WBC # BLD: 10.73 K/UL — HIGH (ref 3.8–10.5)
WBC # FLD AUTO: 10.73 K/UL — HIGH (ref 3.8–10.5)

## 2025-08-02 PROCEDURE — 84484 ASSAY OF TROPONIN QUANT: CPT

## 2025-08-02 PROCEDURE — 99285 EMERGENCY DEPT VISIT HI MDM: CPT

## 2025-08-02 PROCEDURE — 93005 ELECTROCARDIOGRAM TRACING: CPT

## 2025-08-02 PROCEDURE — 93010 ELECTROCARDIOGRAM REPORT: CPT | Mod: 76

## 2025-08-02 PROCEDURE — 82962 GLUCOSE BLOOD TEST: CPT

## 2025-08-02 PROCEDURE — 85025 COMPLETE CBC W/AUTO DIFF WBC: CPT

## 2025-08-02 PROCEDURE — 80053 COMPREHEN METABOLIC PANEL: CPT

## 2025-08-02 PROCEDURE — 99284 EMERGENCY DEPT VISIT MOD MDM: CPT | Mod: 25

## 2025-08-02 PROCEDURE — 36415 COLL VENOUS BLD VENIPUNCTURE: CPT

## 2025-08-02 RX ADMIN — Medication 1000 MILLILITER(S): at 17:39

## 2025-08-02 RX ADMIN — Medication 40 MILLIEQUIVALENT(S): at 19:30

## 2025-08-27 ENCOUNTER — APPOINTMENT (OUTPATIENT)
Dept: PAIN MANAGEMENT | Facility: CLINIC | Age: 85
End: 2025-08-27